# Patient Record
Sex: MALE | Race: WHITE | Employment: FULL TIME
[De-identification: names, ages, dates, MRNs, and addresses within clinical notes are randomized per-mention and may not be internally consistent; named-entity substitution may affect disease eponyms.]

---

## 2017-10-27 ENCOUNTER — HOSPITAL ENCOUNTER (OUTPATIENT)
Dept: MRI IMAGING | Facility: CLINIC | Age: 52
Discharge: HOME | End: 2017-10-27
Payer: OTHER GOVERNMENT

## 2017-10-27 DIAGNOSIS — S46.912A ELBOW STRAIN, LEFT, INITIAL ENCOUNTER: ICD-10-CM

## 2017-10-27 PROCEDURE — 73221 MRI JOINT UPR EXTREM W/O DYE: CPT

## 2017-11-08 ENCOUNTER — HOSPITAL ENCOUNTER (OUTPATIENT)
Dept: NEUROLOGY | Age: 52
Discharge: HOME | End: 2017-11-08
Payer: OTHER GOVERNMENT

## 2017-11-08 PROCEDURE — 95908 NRV CNDJ TST 3-4 STUDIES: CPT | Performed by: PHYSICAL MEDICINE & REHABILITATION

## 2017-11-08 PROCEDURE — 95886 MUSC TEST DONE W/N TEST COMP: CPT | Performed by: PHYSICAL MEDICINE & REHABILITATION

## 2018-02-03 ENCOUNTER — HOSPITAL ENCOUNTER (OUTPATIENT)
Age: 53
Discharge: HOME | End: 2018-02-03
Payer: COMMERCIAL

## 2018-02-03 DIAGNOSIS — N20.0 NEPHROLITHIASIS: ICD-10-CM

## 2018-02-03 DIAGNOSIS — Z12.5 SCREENING PSA (PROSTATE SPECIFIC ANTIGEN): ICD-10-CM

## 2018-02-03 DIAGNOSIS — I48.19 PERSISTENT ATRIAL FIBRILLATION (HCC): ICD-10-CM

## 2018-02-03 DIAGNOSIS — Z00.00 ANNUAL PHYSICAL EXAM: ICD-10-CM

## 2018-02-03 DIAGNOSIS — E78.00 PURE HYPERCHOLESTEROLEMIA: ICD-10-CM

## 2018-02-03 LAB
ABSOLUTE EOS #: 0.3 K/UL (ref 0–0.4)
ABSOLUTE IMMATURE GRANULOCYTE: ABNORMAL K/UL (ref 0–0.3)
ABSOLUTE LYMPH #: 1.9 K/UL (ref 1–4.8)
ABSOLUTE MONO #: 0.6 K/UL (ref 0.1–1.3)
ALBUMIN SERPL-MCNC: 3.9 G/DL (ref 3.5–5.2)
ALBUMIN/GLOBULIN RATIO: ABNORMAL (ref 1–2.5)
ALP BLD-CCNC: 96 U/L (ref 40–129)
ALT SERPL-CCNC: 110 U/L (ref 5–41)
ANION GAP SERPL CALCULATED.3IONS-SCNC: 14 MMOL/L (ref 9–17)
AST SERPL-CCNC: 29 U/L
BASOPHILS # BLD: 1 % (ref 0–2)
BASOPHILS ABSOLUTE: 0 K/UL (ref 0–0.2)
BILIRUB SERPL-MCNC: 0.74 MG/DL (ref 0.3–1.2)
BILIRUBIN DIRECT: 0.19 MG/DL
BILIRUBIN, INDIRECT: 0.55 MG/DL (ref 0–1)
BUN BLDV-MCNC: 19 MG/DL (ref 6–20)
BUN/CREAT BLD: ABNORMAL (ref 9–20)
CALCIUM SERPL-MCNC: 9.5 MG/DL (ref 8.6–10.4)
CHLORIDE BLD-SCNC: 102 MMOL/L (ref 98–107)
CHOLESTEROL/HDL RATIO: 4.5
CHOLESTEROL: 231 MG/DL
CO2: 23 MMOL/L (ref 20–31)
CREAT SERPL-MCNC: 1.09 MG/DL (ref 0.7–1.2)
DIFFERENTIAL TYPE: ABNORMAL
EOSINOPHILS RELATIVE PERCENT: 3 % (ref 0–4)
GFR AFRICAN AMERICAN: >60 ML/MIN
GFR NON-AFRICAN AMERICAN: >60 ML/MIN
GFR SERPL CREATININE-BSD FRML MDRD: ABNORMAL ML/MIN/{1.73_M2}
GFR SERPL CREATININE-BSD FRML MDRD: ABNORMAL ML/MIN/{1.73_M2}
GLOBULIN: ABNORMAL G/DL (ref 1.5–3.8)
GLUCOSE BLD-MCNC: 107 MG/DL (ref 70–99)
HCT VFR BLD CALC: 50.6 % (ref 41–53)
HDLC SERPL-MCNC: 51 MG/DL
HEMOGLOBIN: 17.1 G/DL (ref 13.5–17.5)
HEPATITIS C ANTIBODY: NONREACTIVE
IMMATURE GRANULOCYTES: ABNORMAL %
LDL CHOLESTEROL: 161 MG/DL (ref 0–130)
LYMPHOCYTES # BLD: 21 % (ref 24–44)
MCH RBC QN AUTO: 29.7 PG (ref 26–34)
MCHC RBC AUTO-ENTMCNC: 33.8 G/DL (ref 31–37)
MCV RBC AUTO: 87.9 FL (ref 80–100)
MONOCYTES # BLD: 7 % (ref 1–7)
NRBC AUTOMATED: ABNORMAL PER 100 WBC
PDW BLD-RTO: 14.3 % (ref 11.5–14.9)
PLATELET # BLD: 240 K/UL (ref 150–450)
PLATELET ESTIMATE: ABNORMAL
PMV BLD AUTO: 7.5 FL (ref 6–12)
POTASSIUM SERPL-SCNC: 4.3 MMOL/L (ref 3.7–5.3)
PROSTATE SPECIFIC ANTIGEN: 1.06 UG/L
RBC # BLD: 5.75 M/UL (ref 4.5–5.9)
RBC # BLD: ABNORMAL 10*6/UL
SEG NEUTROPHILS: 68 % (ref 36–66)
SEGMENTED NEUTROPHILS ABSOLUTE COUNT: 6.1 K/UL (ref 1.3–9.1)
SODIUM BLD-SCNC: 139 MMOL/L (ref 135–144)
THYROXINE, FREE: 0.92 NG/DL (ref 0.93–1.7)
TOTAL PROTEIN: 7.6 G/DL (ref 6.4–8.3)
TRIGL SERPL-MCNC: 95 MG/DL
TSH SERPL DL<=0.05 MIU/L-ACNC: 1.98 MIU/L (ref 0.3–5)
URIC ACID: 7.4 MG/DL (ref 3.4–7)
VLDLC SERPL CALC-MCNC: ABNORMAL MG/DL (ref 1–30)
WBC # BLD: 8.9 K/UL (ref 3.5–11)
WBC # BLD: ABNORMAL 10*3/UL

## 2018-02-03 PROCEDURE — 80076 HEPATIC FUNCTION PANEL: CPT

## 2018-02-03 PROCEDURE — 86803 HEPATITIS C AB TEST: CPT

## 2018-02-03 PROCEDURE — 85025 COMPLETE CBC W/AUTO DIFF WBC: CPT

## 2018-02-03 PROCEDURE — 80061 LIPID PANEL: CPT

## 2018-02-03 PROCEDURE — 84550 ASSAY OF BLOOD/URIC ACID: CPT

## 2018-02-03 PROCEDURE — 80048 BASIC METABOLIC PNL TOTAL CA: CPT

## 2018-02-03 PROCEDURE — G0103 PSA SCREENING: HCPCS

## 2018-02-03 PROCEDURE — 36415 COLL VENOUS BLD VENIPUNCTURE: CPT

## 2018-02-03 PROCEDURE — 84443 ASSAY THYROID STIM HORMONE: CPT

## 2018-02-03 PROCEDURE — 84439 ASSAY OF FREE THYROXINE: CPT

## 2018-03-12 ENCOUNTER — HOSPITAL ENCOUNTER (OUTPATIENT)
Dept: OCCUPATIONAL THERAPY | Age: 53
Setting detail: THERAPIES SERIES
Discharge: HOME | End: 2018-03-12
Payer: OTHER GOVERNMENT

## 2018-03-12 ENCOUNTER — HOSPITAL ENCOUNTER (OUTPATIENT)
Age: 53
Discharge: HOME | End: 2018-03-12
Payer: OTHER GOVERNMENT

## 2018-03-12 LAB
BUN BLDV-MCNC: 18 MG/DL (ref 6–20)
CREAT SERPL-MCNC: 0.97 MG/DL (ref 0.7–1.2)
GFR AFRICAN AMERICAN: >60 ML/MIN
GFR NON-AFRICAN AMERICAN: >60 ML/MIN
GFR SERPL CREATININE-BSD FRML MDRD: NORMAL ML/MIN/{1.73_M2}
GFR SERPL CREATININE-BSD FRML MDRD: NORMAL ML/MIN/{1.73_M2}

## 2018-03-12 PROCEDURE — 97110 THERAPEUTIC EXERCISES: CPT

## 2018-03-12 PROCEDURE — 36415 COLL VENOUS BLD VENIPUNCTURE: CPT

## 2018-03-12 PROCEDURE — 84520 ASSAY OF UREA NITROGEN: CPT

## 2018-03-12 PROCEDURE — 97166 OT EVAL MOD COMPLEX 45 MIN: CPT

## 2018-03-12 PROCEDURE — 82565 ASSAY OF CREATININE: CPT

## 2018-03-12 ASSESSMENT — PAIN SCALES - GENERAL: PAINLEVEL_OUTOF10: 6

## 2018-03-12 ASSESSMENT — PAIN DESCRIPTION - ORIENTATION: ORIENTATION: LEFT

## 2018-03-12 ASSESSMENT — PAIN DESCRIPTION - FREQUENCY: FREQUENCY: CONTINUOUS

## 2018-03-12 ASSESSMENT — PAIN DESCRIPTION - LOCATION: LOCATION: ELBOW;WRIST;HAND

## 2018-03-12 ASSESSMENT — 9 HOLE PEG TEST
TEST_RESULT: IMPAIRED
TESTTIME_SECONDS: 52
TESTTIME_SECONDS: 26

## 2018-03-12 ASSESSMENT — PAIN DESCRIPTION - PAIN TYPE: TYPE: ACUTE PAIN

## 2018-03-22 ENCOUNTER — HOSPITAL ENCOUNTER (OUTPATIENT)
Dept: OCCUPATIONAL THERAPY | Age: 53
Setting detail: THERAPIES SERIES
Discharge: HOME | End: 2018-03-22
Payer: OTHER GOVERNMENT

## 2018-03-22 PROCEDURE — 97110 THERAPEUTIC EXERCISES: CPT

## 2018-03-22 ASSESSMENT — PAIN DESCRIPTION - LOCATION: LOCATION: ELBOW;SHOULDER;ARM

## 2018-03-22 ASSESSMENT — PAIN DESCRIPTION - FREQUENCY: FREQUENCY: CONTINUOUS

## 2018-03-22 ASSESSMENT — PAIN SCALES - GENERAL: PAINLEVEL_OUTOF10: 4

## 2018-03-22 ASSESSMENT — PAIN DESCRIPTION - PAIN TYPE: TYPE: ACUTE PAIN

## 2018-03-22 ASSESSMENT — PAIN DESCRIPTION - PROGRESSION: CLINICAL_PROGRESSION: GRADUALLY IMPROVING

## 2018-03-22 ASSESSMENT — PAIN DESCRIPTION - ORIENTATION: ORIENTATION: LEFT

## 2018-03-22 NOTE — PROGRESS NOTES
Occupational [address] [place] [address]   Rehabilitation Services  Occupational Therapy Treatment Note  Date: 3/22/18  Patient Name: Hardy Ormond    MRN: [id number]  Account: [de-identified]   : 1965  (46 y.o.) Gender: male     General  Referring Practitioner: Dr Janice Delgado. Dana  Diagnosis: s/p lt elbow extensor tendon repair (2018)    (ICD-10 codes T96.777Q lt strain of elbow & forearm,  lt shoulder impingement syndrome, G95.945L lt shoulder strain)  OT Visit Information  OT Insurance Information:  [facility]  ( fax# [phone]) (phone# [phone])- pt's claim # [id number] ([employer] - fax# [phone]. Total # of Visits Approved: 18  Total # of Visits to Date: 2  Progress Note Counter: MD appt 2018  Subjective: Pt states at rest pain 3-4 lt elbow, catching and popping in lt shoulder and elbow. After PROM lt UE pain goes to 7-8. Pt had MRI rt arm. Pt reports difficulty using lt hand to close car door, to pull and hook seat belt. Pt reports difficulty pulling open doors /door knobs.   Pain Assessment  Patient Currently in Pain: Yes  Pain Assessment: 0-10  Pain Level: 4 (pain 3-4 pre tx, and pain 7-8 post exercise)  Pain Type: Acute pain  Pain Location: Elbow, Shoulder, Arm  Pain Orientation: Left  Pain Descriptors: Aching, Sore, Tingling, Tightness  Pain Frequency: Continuous  Clinical Progression: Gradually improving  Patient's Stated Pain Goal: No pain           Other exercises  Other exercises 1: lt hand based skateboard 20x reps each way (forward flexion/extension, horizontal abd/adduction, circles cw/ccw)  Other exercises 2: PROM lt shoulder, elbow, wrist, hand  Other exercises 3: Lt shoulder / scapula mobilization  Other exercises 4: Large red /white rom hoop (ht 31\") over and back w lt UE  Other exercises 5: Bilateral UE hold small yellow therapy ball  20 reps ( shoulder flexion/extension, shoulder horizontal

## 2018-03-26 ENCOUNTER — HOSPITAL ENCOUNTER (OUTPATIENT)
Dept: OCCUPATIONAL THERAPY | Age: 53
Setting detail: THERAPIES SERIES
Discharge: HOME | End: 2018-03-26
Payer: OTHER GOVERNMENT

## 2018-03-26 PROCEDURE — 97110 THERAPEUTIC EXERCISES: CPT

## 2018-03-26 ASSESSMENT — PAIN DESCRIPTION - DESCRIPTORS: DESCRIPTORS: ACHING;SORE;TINGLING

## 2018-03-26 ASSESSMENT — PAIN DESCRIPTION - PROGRESSION: CLINICAL_PROGRESSION: NOT CHANGED

## 2018-03-26 ASSESSMENT — PAIN DESCRIPTION - ORIENTATION: ORIENTATION: LEFT

## 2018-03-26 ASSESSMENT — PAIN DESCRIPTION - PAIN TYPE: TYPE: ACUTE PAIN

## 2018-03-26 ASSESSMENT — PAIN SCALES - GENERAL: PAINLEVEL_OUTOF10: 7

## 2018-03-26 ASSESSMENT — PAIN DESCRIPTION - LOCATION: LOCATION: ELBOW;SHOULDER;WRIST;HAND

## 2018-03-26 ASSESSMENT — PAIN DESCRIPTION - FREQUENCY: FREQUENCY: INTERMITTENT

## 2018-03-26 NOTE — PROGRESS NOTES
and IR/ER (Pain with ABD/ADD and ER)  Assessment  Performance deficits / Impairments: Decreased ROM, Decreased strength, Decreased fine motor control, Decreased sensation (Pain LUE)  Assessment: OT treatment focused on decreasing pain and increasing strength/function of LUE - see OT exercise sheet for detailed report. Added isometric exercises for shoulder to increase stability. Pt reported pain with ABD/ADD and ER but was able to complete exercises at slow pace and with occasional rest breaks. Pt reports that he is still having a hard time with simple tasks like buckling his seatbelt and opening a bottle of soda.   Treatment Diagnosis: Lt UE /hand weakness, pain, impaired coordination  Prognosis: Good  REQUIRES OT FOLLOW UP: Yes  Discharge Recommendations: Outpatient OT     Plan  REQUIRES OT FOLLOW UP: Yes     OT Individual Minutes  Time In: 6589  Time Out: 1616  Minutes: 44  Time Code Minutes   Timed Code Treatment Minutes: 44 Minutes    Electronically signed by Marquis Boone on 3/26/18 at 4:19 PM    Treatment Charges:  Minutes Units   Ultrasound     Electrical Stim     Iontophoresis     Paraffin      Massage     Eval     ADL      Ther Exercise 44 3   Ther Activities     Neuro Re-Ed     Splinting      Other     Total Treatment Time:  59 3

## 2018-03-28 ENCOUNTER — HOSPITAL ENCOUNTER (OUTPATIENT)
Dept: OCCUPATIONAL THERAPY | Age: 53
Setting detail: THERAPIES SERIES
Discharge: HOME | End: 2018-03-28
Payer: OTHER GOVERNMENT

## 2018-03-28 PROCEDURE — 97110 THERAPEUTIC EXERCISES: CPT

## 2018-03-28 ASSESSMENT — PAIN SCALES - GENERAL: PAINLEVEL_OUTOF10: 5

## 2018-03-28 ASSESSMENT — PAIN DESCRIPTION - ORIENTATION: ORIENTATION: LEFT

## 2018-03-28 ASSESSMENT — PAIN DESCRIPTION - DESCRIPTORS: DESCRIPTORS: ACHING;SORE;TINGLING

## 2018-03-28 ASSESSMENT — PAIN DESCRIPTION - PROGRESSION: CLINICAL_PROGRESSION: GRADUALLY IMPROVING

## 2018-03-28 ASSESSMENT — PAIN DESCRIPTION - PAIN TYPE: TYPE: ACUTE PAIN

## 2018-03-28 ASSESSMENT — PAIN DESCRIPTION - FREQUENCY: FREQUENCY: INTERMITTENT

## 2018-03-28 ASSESSMENT — PAIN DESCRIPTION - LOCATION: LOCATION: ELBOW;SHOULDER;WRIST

## 2018-04-02 ENCOUNTER — HOSPITAL ENCOUNTER (OUTPATIENT)
Dept: OCCUPATIONAL THERAPY | Age: 53
Setting detail: THERAPIES SERIES
Discharge: HOME | End: 2018-04-02
Payer: OTHER GOVERNMENT

## 2018-04-02 PROCEDURE — 97110 THERAPEUTIC EXERCISES: CPT

## 2018-04-02 ASSESSMENT — PAIN DESCRIPTION - FREQUENCY: FREQUENCY: INTERMITTENT

## 2018-04-02 ASSESSMENT — PAIN SCALES - GENERAL: PAINLEVEL_OUTOF10: 8

## 2018-04-02 ASSESSMENT — PAIN DESCRIPTION - ORIENTATION: ORIENTATION: LEFT

## 2018-04-02 ASSESSMENT — PAIN DESCRIPTION - LOCATION: LOCATION: ARM;NECK

## 2018-04-02 ASSESSMENT — PAIN DESCRIPTION - PROGRESSION: CLINICAL_PROGRESSION: GRADUALLY WORSENING

## 2018-04-02 ASSESSMENT — PAIN DESCRIPTION - DESCRIPTORS: DESCRIPTORS: ACHING

## 2018-04-02 ASSESSMENT — PAIN DESCRIPTION - PAIN TYPE: TYPE: ACUTE PAIN

## 2018-04-04 ENCOUNTER — HOSPITAL ENCOUNTER (OUTPATIENT)
Dept: OCCUPATIONAL THERAPY | Age: 53
Setting detail: THERAPIES SERIES
Discharge: HOME | End: 2018-04-04
Payer: OTHER GOVERNMENT

## 2018-04-04 PROCEDURE — 97110 THERAPEUTIC EXERCISES: CPT

## 2018-04-04 ASSESSMENT — PAIN DESCRIPTION - PAIN TYPE: TYPE: ACUTE PAIN

## 2018-04-04 ASSESSMENT — PAIN SCALES - GENERAL: PAINLEVEL_OUTOF10: 6

## 2018-04-04 ASSESSMENT — PAIN DESCRIPTION - PROGRESSION: CLINICAL_PROGRESSION: GRADUALLY IMPROVING

## 2018-04-04 ASSESSMENT — PAIN DESCRIPTION - LOCATION: LOCATION: ARM;SHOULDER;ELBOW

## 2018-04-04 ASSESSMENT — PAIN DESCRIPTION - ORIENTATION: ORIENTATION: LEFT

## 2018-04-04 ASSESSMENT — PAIN DESCRIPTION - DESCRIPTORS: DESCRIPTORS: ACHING;SORE

## 2018-04-06 ENCOUNTER — HOSPITAL ENCOUNTER (OUTPATIENT)
Dept: OCCUPATIONAL THERAPY | Age: 53
Setting detail: THERAPIES SERIES
Discharge: HOME | End: 2018-04-06
Payer: OTHER GOVERNMENT

## 2018-04-06 PROCEDURE — 97110 THERAPEUTIC EXERCISES: CPT

## 2018-04-06 ASSESSMENT — PAIN DESCRIPTION - PROGRESSION: CLINICAL_PROGRESSION: NOT CHANGED

## 2018-04-06 ASSESSMENT — 9 HOLE PEG TEST
TESTTIME_SECONDS: 38
TEST_RESULT: IMPAIRED

## 2018-04-06 ASSESSMENT — PAIN DESCRIPTION - LOCATION: LOCATION: SHOULDER;ELBOW

## 2018-04-06 ASSESSMENT — PAIN DESCRIPTION - ORIENTATION: ORIENTATION: LEFT

## 2018-04-06 ASSESSMENT — PAIN DESCRIPTION - FREQUENCY: FREQUENCY: INTERMITTENT

## 2018-04-06 ASSESSMENT — PAIN SCALES - GENERAL: PAINLEVEL_OUTOF10: 7

## 2018-04-06 ASSESSMENT — PAIN DESCRIPTION - PAIN TYPE: TYPE: ACUTE PAIN

## 2018-04-06 ASSESSMENT — PAIN DESCRIPTION - DESCRIPTORS: DESCRIPTORS: ACHING;SORE

## 2018-04-09 ENCOUNTER — HOSPITAL ENCOUNTER (OUTPATIENT)
Dept: OCCUPATIONAL THERAPY | Age: 53
Setting detail: THERAPIES SERIES
Discharge: HOME | End: 2018-04-09
Payer: OTHER GOVERNMENT

## 2018-04-09 PROBLEM — G47.33 OBSTRUCTIVE SLEEP APNEA SYNDROME: Status: ACTIVE | Noted: 2018-04-09

## 2018-04-09 PROCEDURE — 97110 THERAPEUTIC EXERCISES: CPT

## 2018-04-09 ASSESSMENT — PAIN DESCRIPTION - LOCATION: LOCATION: ARM;SHOULDER;ELBOW

## 2018-04-09 ASSESSMENT — PAIN SCALES - GENERAL: PAINLEVEL_OUTOF10: 7

## 2018-04-09 ASSESSMENT — PAIN DESCRIPTION - PAIN TYPE: TYPE: ACUTE PAIN

## 2018-04-09 ASSESSMENT — PAIN DESCRIPTION - ORIENTATION: ORIENTATION: LEFT

## 2018-04-09 ASSESSMENT — PAIN DESCRIPTION - DESCRIPTORS: DESCRIPTORS: SORE;ACHING

## 2018-04-09 ASSESSMENT — PAIN DESCRIPTION - FREQUENCY: FREQUENCY: INTERMITTENT

## 2018-04-09 ASSESSMENT — PAIN DESCRIPTION - PROGRESSION: CLINICAL_PROGRESSION: NOT CHANGED

## 2018-04-11 ENCOUNTER — HOSPITAL ENCOUNTER (OUTPATIENT)
Dept: OCCUPATIONAL THERAPY | Age: 53
Setting detail: THERAPIES SERIES
Discharge: HOME | End: 2018-04-11
Payer: OTHER GOVERNMENT

## 2018-04-11 PROCEDURE — 97110 THERAPEUTIC EXERCISES: CPT

## 2018-04-11 ASSESSMENT — PAIN DESCRIPTION - DESCRIPTORS: DESCRIPTORS: ACHING;SORE;NUMBNESS

## 2018-04-11 ASSESSMENT — 9 HOLE PEG TEST
TESTTIME_SECONDS: 32
TEST_RESULT: IMPAIRED

## 2018-04-11 ASSESSMENT — PAIN DESCRIPTION - ORIENTATION: ORIENTATION: LEFT

## 2018-04-11 ASSESSMENT — PAIN SCALES - GENERAL: PAINLEVEL_OUTOF10: 6

## 2018-04-11 ASSESSMENT — PAIN DESCRIPTION - PROGRESSION: CLINICAL_PROGRESSION: NOT CHANGED

## 2018-04-11 ASSESSMENT — PAIN DESCRIPTION - PAIN TYPE: TYPE: ACUTE PAIN

## 2018-04-13 ENCOUNTER — HOSPITAL ENCOUNTER (OUTPATIENT)
Dept: OCCUPATIONAL THERAPY | Age: 53
Setting detail: THERAPIES SERIES
Discharge: HOME | End: 2018-04-13
Payer: OTHER GOVERNMENT

## 2018-04-13 PROCEDURE — 97110 THERAPEUTIC EXERCISES: CPT

## 2018-04-13 ASSESSMENT — PAIN DESCRIPTION - FREQUENCY: FREQUENCY: INTERMITTENT

## 2018-04-13 ASSESSMENT — PAIN SCALES - GENERAL: PAINLEVEL_OUTOF10: 7

## 2018-04-13 ASSESSMENT — PAIN DESCRIPTION - PROGRESSION: CLINICAL_PROGRESSION: NOT CHANGED

## 2018-04-13 ASSESSMENT — PAIN DESCRIPTION - PAIN TYPE: TYPE: ACUTE PAIN

## 2018-04-13 ASSESSMENT — PAIN DESCRIPTION - DESCRIPTORS: DESCRIPTORS: ACHING;TIGHTNESS;THROBBING

## 2018-04-13 ASSESSMENT — PAIN DESCRIPTION - ORIENTATION: ORIENTATION: LEFT

## 2018-04-13 ASSESSMENT — PAIN DESCRIPTION - LOCATION: LOCATION: SHOULDER;ELBOW;HAND

## 2018-04-18 ENCOUNTER — HOSPITAL ENCOUNTER (OUTPATIENT)
Dept: OCCUPATIONAL THERAPY | Age: 53
Setting detail: THERAPIES SERIES
Discharge: HOME | End: 2018-04-18
Payer: OTHER GOVERNMENT

## 2018-04-18 PROCEDURE — 97110 THERAPEUTIC EXERCISES: CPT

## 2018-04-18 ASSESSMENT — PAIN DESCRIPTION - PAIN TYPE: TYPE: ACUTE PAIN

## 2018-04-18 ASSESSMENT — PAIN DESCRIPTION - ORIENTATION: ORIENTATION: LEFT

## 2018-04-18 ASSESSMENT — PAIN DESCRIPTION - DESCRIPTORS: DESCRIPTORS: ACHING;THROBBING;TIGHTNESS

## 2018-04-18 ASSESSMENT — PAIN DESCRIPTION - FREQUENCY: FREQUENCY: INTERMITTENT

## 2018-04-18 ASSESSMENT — PAIN SCALES - GENERAL: PAINLEVEL_OUTOF10: 6

## 2018-04-18 ASSESSMENT — PAIN DESCRIPTION - LOCATION: LOCATION: ARM;ELBOW;SHOULDER

## 2018-04-20 ENCOUNTER — HOSPITAL ENCOUNTER (OUTPATIENT)
Dept: OCCUPATIONAL THERAPY | Age: 53
Setting detail: THERAPIES SERIES
Discharge: HOME | End: 2018-04-20
Payer: OTHER GOVERNMENT

## 2018-04-20 PROCEDURE — 97110 THERAPEUTIC EXERCISES: CPT

## 2018-04-20 ASSESSMENT — PAIN DESCRIPTION - ORIENTATION: ORIENTATION: LEFT

## 2018-04-20 ASSESSMENT — PAIN DESCRIPTION - FREQUENCY: FREQUENCY: INTERMITTENT

## 2018-04-20 ASSESSMENT — PAIN DESCRIPTION - LOCATION: LOCATION: ELBOW;SHOULDER;ARM

## 2018-04-20 ASSESSMENT — PAIN DESCRIPTION - PROGRESSION: CLINICAL_PROGRESSION: NOT CHANGED

## 2018-04-20 ASSESSMENT — PAIN DESCRIPTION - PAIN TYPE: TYPE: ACUTE PAIN

## 2018-04-23 ENCOUNTER — HOSPITAL ENCOUNTER (OUTPATIENT)
Dept: OCCUPATIONAL THERAPY | Age: 53
Setting detail: THERAPIES SERIES
Discharge: HOME | End: 2018-04-23
Payer: OTHER GOVERNMENT

## 2018-04-23 PROCEDURE — 97110 THERAPEUTIC EXERCISES: CPT

## 2018-04-23 ASSESSMENT — PAIN DESCRIPTION - FREQUENCY: FREQUENCY: INTERMITTENT

## 2018-04-23 ASSESSMENT — PAIN DESCRIPTION - PAIN TYPE: TYPE: ACUTE PAIN

## 2018-04-23 ASSESSMENT — PAIN DESCRIPTION - LOCATION: LOCATION: ELBOW;SHOULDER

## 2018-04-23 ASSESSMENT — PAIN DESCRIPTION - ORIENTATION: ORIENTATION: LEFT

## 2018-04-23 ASSESSMENT — PAIN SCALES - GENERAL: PAINLEVEL_OUTOF10: 7

## 2018-04-23 ASSESSMENT — PAIN DESCRIPTION - PROGRESSION: CLINICAL_PROGRESSION: NOT CHANGED

## 2018-04-25 ENCOUNTER — HOSPITAL ENCOUNTER (OUTPATIENT)
Dept: OCCUPATIONAL THERAPY | Age: 53
Setting detail: THERAPIES SERIES
Discharge: HOME | End: 2018-04-25
Payer: OTHER GOVERNMENT

## 2018-04-25 PROCEDURE — 97110 THERAPEUTIC EXERCISES: CPT

## 2018-04-25 ASSESSMENT — PAIN DESCRIPTION - DESCRIPTORS: DESCRIPTORS: ACHING;SHARP

## 2018-04-25 ASSESSMENT — 9 HOLE PEG TEST
TEST_RESULT: IMPAIRED
TESTTIME_SECONDS: 34

## 2018-04-25 ASSESSMENT — PAIN DESCRIPTION - ORIENTATION: ORIENTATION: LEFT

## 2018-04-25 ASSESSMENT — PAIN DESCRIPTION - FREQUENCY: FREQUENCY: INTERMITTENT

## 2018-04-25 ASSESSMENT — PAIN DESCRIPTION - LOCATION: LOCATION: ARM;SHOULDER;ELBOW

## 2018-04-25 ASSESSMENT — PAIN DESCRIPTION - PROGRESSION: CLINICAL_PROGRESSION: NOT CHANGED

## 2018-04-25 ASSESSMENT — PAIN SCALES - GENERAL: PAINLEVEL_OUTOF10: 7

## 2018-04-26 ENCOUNTER — HOSPITAL ENCOUNTER (OUTPATIENT)
Dept: OCCUPATIONAL THERAPY | Age: 53
Setting detail: THERAPIES SERIES
Discharge: HOME | End: 2018-04-26
Payer: OTHER GOVERNMENT

## 2018-05-07 ENCOUNTER — HOSPITAL ENCOUNTER (OUTPATIENT)
Dept: OCCUPATIONAL THERAPY | Age: 53
Setting detail: THERAPIES SERIES
Discharge: HOME | End: 2018-05-07
Payer: OTHER GOVERNMENT

## 2018-05-07 PROCEDURE — 97110 THERAPEUTIC EXERCISES: CPT

## 2018-05-07 ASSESSMENT — PAIN DESCRIPTION - DESCRIPTORS: DESCRIPTORS: ACHING;SORE

## 2018-05-07 ASSESSMENT — PAIN SCALES - GENERAL: PAINLEVEL_OUTOF10: 7

## 2018-05-07 ASSESSMENT — PAIN DESCRIPTION - ORIENTATION: ORIENTATION: LEFT

## 2018-05-07 ASSESSMENT — PAIN DESCRIPTION - PAIN TYPE: TYPE: ACUTE PAIN

## 2018-05-07 ASSESSMENT — PAIN DESCRIPTION - LOCATION: LOCATION: ARM;SHOULDER;ELBOW

## 2018-05-09 ENCOUNTER — HOSPITAL ENCOUNTER (OUTPATIENT)
Dept: OCCUPATIONAL THERAPY | Age: 53
Setting detail: THERAPIES SERIES
Discharge: HOME | End: 2018-05-09
Payer: OTHER GOVERNMENT

## 2018-05-09 PROCEDURE — 97110 THERAPEUTIC EXERCISES: CPT

## 2018-05-09 ASSESSMENT — PAIN DESCRIPTION - DESCRIPTORS: DESCRIPTORS: ACHING;SHARP;SORE

## 2018-05-09 ASSESSMENT — PAIN SCALES - GENERAL: PAINLEVEL_OUTOF10: 8

## 2018-05-09 ASSESSMENT — PAIN DESCRIPTION - FREQUENCY: FREQUENCY: INTERMITTENT

## 2018-05-09 ASSESSMENT — PAIN DESCRIPTION - PROGRESSION: CLINICAL_PROGRESSION: GRADUALLY WORSENING

## 2018-05-09 ASSESSMENT — PAIN DESCRIPTION - ORIENTATION: ORIENTATION: LEFT

## 2018-05-09 ASSESSMENT — PAIN DESCRIPTION - LOCATION: LOCATION: SHOULDER;ELBOW

## 2018-05-11 ENCOUNTER — HOSPITAL ENCOUNTER (OUTPATIENT)
Dept: OCCUPATIONAL THERAPY | Age: 53
Setting detail: THERAPIES SERIES
Discharge: HOME | End: 2018-05-11
Payer: OTHER GOVERNMENT

## 2018-05-11 PROCEDURE — 97110 THERAPEUTIC EXERCISES: CPT

## 2018-05-11 ASSESSMENT — PAIN DESCRIPTION - LOCATION: LOCATION: SHOULDER;WRIST;ELBOW

## 2018-05-11 ASSESSMENT — PAIN SCALES - GENERAL: PAINLEVEL_OUTOF10: 7

## 2018-05-11 ASSESSMENT — PAIN DESCRIPTION - FREQUENCY: FREQUENCY: INTERMITTENT

## 2018-05-11 ASSESSMENT — PAIN DESCRIPTION - DESCRIPTORS: DESCRIPTORS: ACHING;SORE

## 2018-05-11 ASSESSMENT — PAIN DESCRIPTION - ORIENTATION: ORIENTATION: LEFT

## 2018-05-11 ASSESSMENT — PAIN DESCRIPTION - PAIN TYPE: TYPE: ACUTE PAIN

## 2018-05-11 ASSESSMENT — PAIN DESCRIPTION - PROGRESSION: CLINICAL_PROGRESSION: NOT CHANGED

## 2018-05-16 ENCOUNTER — HOSPITAL ENCOUNTER (OUTPATIENT)
Age: 53
Discharge: HOME | End: 2018-05-16
Payer: COMMERCIAL

## 2018-05-16 ENCOUNTER — HOSPITAL ENCOUNTER (OUTPATIENT)
Dept: OCCUPATIONAL THERAPY | Age: 53
Setting detail: THERAPIES SERIES
Discharge: HOME | End: 2018-05-16
Payer: OTHER GOVERNMENT

## 2018-05-16 LAB
ALBUMIN SERPL-MCNC: 4.2 G/DL (ref 3.5–5.2)
ALBUMIN/GLOBULIN RATIO: NORMAL (ref 1–2.5)
ALP BLD-CCNC: 91 U/L (ref 40–129)
ALT SERPL-CCNC: 31 U/L (ref 5–41)
AST SERPL-CCNC: 20 U/L
BILIRUB SERPL-MCNC: 0.78 MG/DL (ref 0.3–1.2)
BILIRUBIN DIRECT: 0.17 MG/DL
BILIRUBIN, INDIRECT: 0.61 MG/DL (ref 0–1)
CHOLESTEROL/HDL RATIO: 3.2
CHOLESTEROL: 162 MG/DL
GLOBULIN: NORMAL G/DL (ref 1.5–3.8)
HDLC SERPL-MCNC: 50 MG/DL
LDL CHOLESTEROL: 90 MG/DL (ref 0–130)
TOTAL PROTEIN: 7.3 G/DL (ref 6.4–8.3)
TRIGL SERPL-MCNC: 109 MG/DL
VLDLC SERPL CALC-MCNC: NORMAL MG/DL (ref 1–30)

## 2018-05-16 PROCEDURE — 80061 LIPID PANEL: CPT

## 2018-05-16 PROCEDURE — 97110 THERAPEUTIC EXERCISES: CPT

## 2018-05-16 PROCEDURE — 80076 HEPATIC FUNCTION PANEL: CPT

## 2018-05-16 PROCEDURE — 36415 COLL VENOUS BLD VENIPUNCTURE: CPT

## 2018-05-16 ASSESSMENT — PAIN DESCRIPTION - DESCRIPTORS: DESCRIPTORS: ACHING;SORE

## 2018-05-16 ASSESSMENT — PAIN SCALES - GENERAL: PAINLEVEL_OUTOF10: 7

## 2018-05-16 ASSESSMENT — PAIN DESCRIPTION - PAIN TYPE: TYPE: ACUTE PAIN

## 2018-05-16 ASSESSMENT — PAIN DESCRIPTION - LOCATION: LOCATION: ELBOW;SHOULDER

## 2018-05-16 ASSESSMENT — PAIN DESCRIPTION - FREQUENCY: FREQUENCY: INTERMITTENT

## 2018-05-16 ASSESSMENT — PAIN DESCRIPTION - ORIENTATION: ORIENTATION: LEFT

## 2018-05-16 ASSESSMENT — 9 HOLE PEG TEST: TESTTIME_SECONDS: 29

## 2018-05-16 ASSESSMENT — PAIN DESCRIPTION - PROGRESSION: CLINICAL_PROGRESSION: NOT CHANGED

## 2018-05-17 ENCOUNTER — HOSPITAL ENCOUNTER (OUTPATIENT)
Dept: OCCUPATIONAL THERAPY | Age: 53
Setting detail: THERAPIES SERIES
Discharge: HOME | End: 2018-05-17
Payer: OTHER GOVERNMENT

## 2018-05-17 PROCEDURE — 97110 THERAPEUTIC EXERCISES: CPT

## 2018-05-17 ASSESSMENT — PAIN DESCRIPTION - PROGRESSION: CLINICAL_PROGRESSION: NOT CHANGED

## 2018-05-17 ASSESSMENT — PAIN SCALES - GENERAL: PAINLEVEL_OUTOF10: 7

## 2018-05-17 ASSESSMENT — PAIN DESCRIPTION - ORIENTATION: ORIENTATION: LEFT

## 2018-05-17 ASSESSMENT — PAIN DESCRIPTION - DESCRIPTORS: DESCRIPTORS: ACHING;SORE

## 2018-05-17 ASSESSMENT — PAIN DESCRIPTION - FREQUENCY: FREQUENCY: INTERMITTENT

## 2018-05-17 ASSESSMENT — PAIN DESCRIPTION - PAIN TYPE: TYPE: ACUTE PAIN

## 2018-05-17 ASSESSMENT — PAIN DESCRIPTION - LOCATION: LOCATION: SHOULDER;ELBOW;WRIST

## 2018-05-25 ENCOUNTER — HOSPITAL ENCOUNTER (OUTPATIENT)
Dept: OCCUPATIONAL THERAPY | Age: 53
Setting detail: THERAPIES SERIES
Discharge: HOME | End: 2018-05-25
Payer: OTHER GOVERNMENT

## 2018-05-25 PROCEDURE — 97110 THERAPEUTIC EXERCISES: CPT

## 2018-05-25 ASSESSMENT — PAIN DESCRIPTION - LOCATION: LOCATION: ELBOW;SHOULDER

## 2018-05-25 ASSESSMENT — PAIN DESCRIPTION - ORIENTATION: ORIENTATION: LEFT

## 2018-05-25 ASSESSMENT — PAIN DESCRIPTION - DESCRIPTORS: DESCRIPTORS: ACHING;SORE

## 2018-05-25 ASSESSMENT — PAIN DESCRIPTION - FREQUENCY: FREQUENCY: INTERMITTENT

## 2018-05-25 ASSESSMENT — PAIN DESCRIPTION - PAIN TYPE: TYPE: ACUTE PAIN

## 2018-05-25 ASSESSMENT — PAIN SCALES - GENERAL: PAINLEVEL_OUTOF10: 6

## 2018-05-25 ASSESSMENT — PAIN DESCRIPTION - PROGRESSION: CLINICAL_PROGRESSION: NOT CHANGED

## 2018-05-30 ENCOUNTER — HOSPITAL ENCOUNTER (OUTPATIENT)
Dept: OCCUPATIONAL THERAPY | Age: 53
Setting detail: THERAPIES SERIES
Discharge: HOME | End: 2018-05-30
Payer: OTHER GOVERNMENT

## 2018-05-30 PROCEDURE — 97110 THERAPEUTIC EXERCISES: CPT

## 2018-05-30 ASSESSMENT — PAIN DESCRIPTION - FREQUENCY: FREQUENCY: CONTINUOUS

## 2018-05-30 ASSESSMENT — PAIN SCALES - GENERAL: PAINLEVEL_OUTOF10: 7

## 2018-05-30 ASSESSMENT — PAIN DESCRIPTION - LOCATION: LOCATION: ELBOW;SHOULDER;WRIST

## 2018-05-30 ASSESSMENT — PAIN DESCRIPTION - PAIN TYPE: TYPE: ACUTE PAIN

## 2018-05-30 ASSESSMENT — PAIN DESCRIPTION - ORIENTATION: ORIENTATION: LEFT

## 2018-05-30 ASSESSMENT — PAIN DESCRIPTION - DESCRIPTORS: DESCRIPTORS: ACHING;SORE;SHOOTING

## 2018-05-30 ASSESSMENT — PAIN DESCRIPTION - PROGRESSION: CLINICAL_PROGRESSION: GRADUALLY WORSENING

## 2018-06-01 ENCOUNTER — HOSPITAL ENCOUNTER (OUTPATIENT)
Dept: OCCUPATIONAL THERAPY | Age: 53
Setting detail: THERAPIES SERIES
Discharge: HOME | End: 2018-06-01
Payer: OTHER GOVERNMENT

## 2018-06-01 PROCEDURE — 97110 THERAPEUTIC EXERCISES: CPT

## 2018-06-01 ASSESSMENT — PAIN DESCRIPTION - DESCRIPTORS: DESCRIPTORS: ACHING;SORE;SHOOTING

## 2018-06-01 ASSESSMENT — PAIN DESCRIPTION - PAIN TYPE: TYPE: ACUTE PAIN

## 2018-06-01 ASSESSMENT — PAIN DESCRIPTION - LOCATION: LOCATION: ELBOW;SHOULDER;WRIST

## 2018-06-01 ASSESSMENT — PAIN DESCRIPTION - ORIENTATION: ORIENTATION: LEFT

## 2018-06-01 ASSESSMENT — PAIN DESCRIPTION - PROGRESSION: CLINICAL_PROGRESSION: NOT CHANGED

## 2018-06-01 ASSESSMENT — PAIN DESCRIPTION - FREQUENCY: FREQUENCY: CONTINUOUS

## 2018-06-01 ASSESSMENT — PAIN SCALES - GENERAL: PAINLEVEL_OUTOF10: 6

## 2018-06-06 ENCOUNTER — HOSPITAL ENCOUNTER (OUTPATIENT)
Dept: OCCUPATIONAL THERAPY | Age: 53
Setting detail: THERAPIES SERIES
Discharge: HOME | End: 2018-06-06
Payer: OTHER GOVERNMENT

## 2018-06-06 ENCOUNTER — HOSPITAL ENCOUNTER (OUTPATIENT)
Dept: PHYSICAL THERAPY | Age: 53
Setting detail: THERAPIES SERIES
Discharge: HOME | End: 2018-06-06
Payer: OTHER GOVERNMENT

## 2018-06-06 PROCEDURE — 97140 MANUAL THERAPY 1/> REGIONS: CPT

## 2018-06-06 PROCEDURE — 97162 PT EVAL MOD COMPLEX 30 MIN: CPT

## 2018-06-06 PROCEDURE — 97110 THERAPEUTIC EXERCISES: CPT

## 2018-06-06 ASSESSMENT — PAIN DESCRIPTION - LOCATION
LOCATION: ELBOW;SHOULDER
LOCATION: ELBOW;WRIST

## 2018-06-06 ASSESSMENT — PAIN DESCRIPTION - ORIENTATION
ORIENTATION: LEFT
ORIENTATION: LEFT

## 2018-06-06 ASSESSMENT — PAIN SCALES - GENERAL
PAINLEVEL_OUTOF10: 7
PAINLEVEL_OUTOF10: 6

## 2018-06-06 ASSESSMENT — PAIN DESCRIPTION - PROGRESSION
CLINICAL_PROGRESSION: GRADUALLY WORSENING
CLINICAL_PROGRESSION: NOT CHANGED

## 2018-06-06 ASSESSMENT — PAIN DESCRIPTION - DESCRIPTORS
DESCRIPTORS: ACHING;CONSTANT
DESCRIPTORS: ACHING;CONSTANT

## 2018-06-06 ASSESSMENT — PAIN DESCRIPTION - FREQUENCY
FREQUENCY: CONTINUOUS
FREQUENCY: CONTINUOUS

## 2018-06-06 ASSESSMENT — PAIN DESCRIPTION - PAIN TYPE
TYPE: ACUTE PAIN
TYPE: ACUTE PAIN

## 2018-06-08 ENCOUNTER — HOSPITAL ENCOUNTER (OUTPATIENT)
Dept: OCCUPATIONAL THERAPY | Age: 53
Setting detail: THERAPIES SERIES
Discharge: HOME | End: 2018-06-08
Payer: OTHER GOVERNMENT

## 2018-06-08 PROCEDURE — 97110 THERAPEUTIC EXERCISES: CPT

## 2018-06-08 ASSESSMENT — PAIN DESCRIPTION - LOCATION: LOCATION: ELBOW;WRIST

## 2018-06-08 ASSESSMENT — PAIN DESCRIPTION - DESCRIPTORS: DESCRIPTORS: ACHING;CONSTANT

## 2018-06-08 ASSESSMENT — PAIN SCALES - GENERAL: PAINLEVEL_OUTOF10: 6

## 2018-06-08 ASSESSMENT — PAIN DESCRIPTION - PAIN TYPE: TYPE: ACUTE PAIN

## 2018-06-08 ASSESSMENT — PAIN DESCRIPTION - ORIENTATION: ORIENTATION: LEFT

## 2018-06-08 ASSESSMENT — PAIN DESCRIPTION - PROGRESSION: CLINICAL_PROGRESSION: NOT CHANGED

## 2018-06-08 ASSESSMENT — PAIN DESCRIPTION - FREQUENCY: FREQUENCY: CONTINUOUS

## 2018-06-11 ENCOUNTER — HOSPITAL ENCOUNTER (OUTPATIENT)
Dept: OCCUPATIONAL THERAPY | Age: 53
Setting detail: THERAPIES SERIES
Discharge: HOME | End: 2018-06-11
Payer: OTHER GOVERNMENT

## 2018-06-11 PROCEDURE — 97110 THERAPEUTIC EXERCISES: CPT

## 2018-06-11 ASSESSMENT — PAIN DESCRIPTION - PROGRESSION: CLINICAL_PROGRESSION: GRADUALLY WORSENING

## 2018-06-11 ASSESSMENT — PAIN DESCRIPTION - PAIN TYPE: TYPE: ACUTE PAIN

## 2018-06-11 ASSESSMENT — PAIN DESCRIPTION - FREQUENCY: FREQUENCY: CONTINUOUS

## 2018-06-11 ASSESSMENT — PAIN DESCRIPTION - DESCRIPTORS: DESCRIPTORS: ACHING;CRAMPING

## 2018-06-11 ASSESSMENT — PAIN SCALES - GENERAL: PAINLEVEL_OUTOF10: 8

## 2018-06-11 ASSESSMENT — PAIN DESCRIPTION - ORIENTATION: ORIENTATION: LEFT

## 2018-06-11 ASSESSMENT — PAIN DESCRIPTION - LOCATION: LOCATION: ELBOW;WRIST

## 2018-06-13 ENCOUNTER — HOSPITAL ENCOUNTER (OUTPATIENT)
Dept: PHYSICAL THERAPY | Age: 53
Setting detail: THERAPIES SERIES
Discharge: HOME | End: 2018-06-13
Payer: OTHER GOVERNMENT

## 2018-06-13 ENCOUNTER — HOSPITAL ENCOUNTER (OUTPATIENT)
Dept: OCCUPATIONAL THERAPY | Age: 53
Setting detail: THERAPIES SERIES
Discharge: HOME | End: 2018-06-13
Payer: OTHER GOVERNMENT

## 2018-06-13 PROCEDURE — G0283 ELEC STIM OTHER THAN WOUND: HCPCS

## 2018-06-13 PROCEDURE — 97110 THERAPEUTIC EXERCISES: CPT

## 2018-06-13 PROCEDURE — 97140 MANUAL THERAPY 1/> REGIONS: CPT

## 2018-06-13 ASSESSMENT — PAIN DESCRIPTION - PAIN TYPE
TYPE: ACUTE PAIN
TYPE: ACUTE PAIN

## 2018-06-13 ASSESSMENT — PAIN SCALES - GENERAL
PAINLEVEL_OUTOF10: 7
PAINLEVEL_OUTOF10: 6

## 2018-06-13 ASSESSMENT — PAIN DESCRIPTION - FREQUENCY: FREQUENCY: CONTINUOUS

## 2018-06-13 ASSESSMENT — PAIN DESCRIPTION - DESCRIPTORS
DESCRIPTORS: SORE;SHARP
DESCRIPTORS: ACHING;SORE

## 2018-06-13 ASSESSMENT — PAIN DESCRIPTION - PROGRESSION
CLINICAL_PROGRESSION: GRADUALLY IMPROVING
CLINICAL_PROGRESSION: GRADUALLY WORSENING

## 2018-06-13 ASSESSMENT — PAIN DESCRIPTION - ORIENTATION
ORIENTATION: LEFT
ORIENTATION: LEFT

## 2018-06-13 ASSESSMENT — PAIN DESCRIPTION - LOCATION
LOCATION: SHOULDER
LOCATION: ELBOW;WRIST

## 2018-06-15 ENCOUNTER — HOSPITAL ENCOUNTER (OUTPATIENT)
Dept: PHYSICAL THERAPY | Age: 53
Setting detail: THERAPIES SERIES
Discharge: HOME | End: 2018-06-15
Payer: OTHER GOVERNMENT

## 2018-06-15 ENCOUNTER — HOSPITAL ENCOUNTER (OUTPATIENT)
Dept: OCCUPATIONAL THERAPY | Age: 53
Setting detail: THERAPIES SERIES
Discharge: HOME | End: 2018-06-15
Payer: OTHER GOVERNMENT

## 2018-06-15 PROCEDURE — 97110 THERAPEUTIC EXERCISES: CPT

## 2018-06-15 PROCEDURE — 97140 MANUAL THERAPY 1/> REGIONS: CPT

## 2018-06-15 ASSESSMENT — PAIN DESCRIPTION - DESCRIPTORS: DESCRIPTORS: ACHING;SORE

## 2018-06-15 ASSESSMENT — PAIN DESCRIPTION - PROGRESSION
CLINICAL_PROGRESSION: GRADUALLY WORSENING
CLINICAL_PROGRESSION: NOT CHANGED

## 2018-06-15 ASSESSMENT — PAIN DESCRIPTION - ORIENTATION
ORIENTATION: LEFT
ORIENTATION: LEFT

## 2018-06-15 ASSESSMENT — PAIN DESCRIPTION - LOCATION
LOCATION: SHOULDER
LOCATION: SHOULDER;ELBOW

## 2018-06-15 ASSESSMENT — PAIN DESCRIPTION - PAIN TYPE
TYPE: ACUTE PAIN
TYPE: ACUTE PAIN

## 2018-06-15 ASSESSMENT — PAIN SCALES - GENERAL
PAINLEVEL_OUTOF10: 6
PAINLEVEL_OUTOF10: 7

## 2018-06-15 ASSESSMENT — PAIN DESCRIPTION - FREQUENCY: FREQUENCY: CONTINUOUS

## 2018-06-18 ENCOUNTER — HOSPITAL ENCOUNTER (OUTPATIENT)
Dept: PHYSICAL THERAPY | Age: 53
Setting detail: THERAPIES SERIES
Discharge: HOME | End: 2018-06-18
Payer: OTHER GOVERNMENT

## 2018-06-18 ENCOUNTER — HOSPITAL ENCOUNTER (OUTPATIENT)
Dept: OCCUPATIONAL THERAPY | Age: 53
Setting detail: THERAPIES SERIES
Discharge: HOME | End: 2018-06-18
Payer: OTHER GOVERNMENT

## 2018-06-18 PROCEDURE — G0283 ELEC STIM OTHER THAN WOUND: HCPCS

## 2018-06-18 PROCEDURE — 97110 THERAPEUTIC EXERCISES: CPT

## 2018-06-18 PROCEDURE — 97140 MANUAL THERAPY 1/> REGIONS: CPT

## 2018-06-18 ASSESSMENT — PAIN SCALES - GENERAL
PAINLEVEL_OUTOF10: 6
PAINLEVEL_OUTOF10: 7

## 2018-06-18 ASSESSMENT — PAIN DESCRIPTION - FREQUENCY
FREQUENCY: CONTINUOUS
FREQUENCY: CONTINUOUS

## 2018-06-18 ASSESSMENT — PAIN DESCRIPTION - DESCRIPTORS
DESCRIPTORS: ACHING
DESCRIPTORS: ACHING;SORE

## 2018-06-18 ASSESSMENT — PAIN DESCRIPTION - ORIENTATION
ORIENTATION: LEFT;ANTERIOR
ORIENTATION: LEFT

## 2018-06-18 ASSESSMENT — PAIN DESCRIPTION - LOCATION
LOCATION: ELBOW
LOCATION: SHOULDER

## 2018-06-18 ASSESSMENT — PAIN DESCRIPTION - PROGRESSION
CLINICAL_PROGRESSION: NOT CHANGED
CLINICAL_PROGRESSION: NOT CHANGED

## 2018-06-18 ASSESSMENT — PAIN DESCRIPTION - PAIN TYPE
TYPE: ACUTE PAIN
TYPE: ACUTE PAIN

## 2018-06-20 ENCOUNTER — APPOINTMENT (OUTPATIENT)
Dept: PHYSICAL THERAPY | Age: 53
End: 2018-06-20
Payer: OTHER GOVERNMENT

## 2018-06-22 ENCOUNTER — HOSPITAL ENCOUNTER (OUTPATIENT)
Dept: PHYSICAL THERAPY | Age: 53
Setting detail: THERAPIES SERIES
Discharge: HOME | End: 2018-06-22
Payer: OTHER GOVERNMENT

## 2018-06-22 ENCOUNTER — HOSPITAL ENCOUNTER (OUTPATIENT)
Dept: OCCUPATIONAL THERAPY | Age: 53
Setting detail: THERAPIES SERIES
Discharge: HOME | End: 2018-06-22
Payer: OTHER GOVERNMENT

## 2018-06-22 PROCEDURE — 97140 MANUAL THERAPY 1/> REGIONS: CPT

## 2018-06-22 PROCEDURE — 97110 THERAPEUTIC EXERCISES: CPT

## 2018-06-22 PROCEDURE — G0283 ELEC STIM OTHER THAN WOUND: HCPCS

## 2018-06-22 ASSESSMENT — PAIN DESCRIPTION - LOCATION
LOCATION: SHOULDER
LOCATION: ELBOW

## 2018-06-22 ASSESSMENT — PAIN DESCRIPTION - DESCRIPTORS: DESCRIPTORS: SORE

## 2018-06-22 ASSESSMENT — PAIN DESCRIPTION - PROGRESSION
CLINICAL_PROGRESSION: NOT CHANGED
CLINICAL_PROGRESSION: NOT CHANGED

## 2018-06-22 ASSESSMENT — PAIN DESCRIPTION - ORIENTATION
ORIENTATION: LEFT
ORIENTATION: LEFT

## 2018-06-22 ASSESSMENT — PAIN SCALES - GENERAL
PAINLEVEL_OUTOF10: 6
PAINLEVEL_OUTOF10: 6

## 2018-06-22 ASSESSMENT — PAIN DESCRIPTION - PAIN TYPE
TYPE: ACUTE PAIN
TYPE: ACUTE PAIN

## 2018-06-22 ASSESSMENT — PAIN DESCRIPTION - FREQUENCY: FREQUENCY: CONTINUOUS

## 2018-06-25 ENCOUNTER — HOSPITAL ENCOUNTER (OUTPATIENT)
Dept: OCCUPATIONAL THERAPY | Age: 53
Setting detail: THERAPIES SERIES
Discharge: HOME | End: 2018-06-25
Payer: OTHER GOVERNMENT

## 2018-06-25 PROCEDURE — 97110 THERAPEUTIC EXERCISES: CPT

## 2018-06-25 ASSESSMENT — PAIN DESCRIPTION - LOCATION: LOCATION: ELBOW

## 2018-06-25 ASSESSMENT — PAIN DESCRIPTION - FREQUENCY: FREQUENCY: CONTINUOUS

## 2018-06-25 ASSESSMENT — PAIN DESCRIPTION - PROGRESSION: CLINICAL_PROGRESSION: NOT CHANGED

## 2018-06-25 ASSESSMENT — PAIN DESCRIPTION - ORIENTATION: ORIENTATION: LEFT

## 2018-06-25 ASSESSMENT — PAIN DESCRIPTION - DESCRIPTORS: DESCRIPTORS: SORE

## 2018-06-25 ASSESSMENT — PAIN SCALES - GENERAL: PAINLEVEL_OUTOF10: 6

## 2018-06-25 ASSESSMENT — PAIN DESCRIPTION - PAIN TYPE: TYPE: ACUTE PAIN

## 2018-06-27 ENCOUNTER — HOSPITAL ENCOUNTER (OUTPATIENT)
Dept: OCCUPATIONAL THERAPY | Age: 53
Setting detail: THERAPIES SERIES
Discharge: HOME | End: 2018-06-27
Payer: OTHER GOVERNMENT

## 2018-06-27 PROCEDURE — 97110 THERAPEUTIC EXERCISES: CPT

## 2018-06-27 ASSESSMENT — PAIN DESCRIPTION - ORIENTATION: ORIENTATION: LEFT

## 2018-06-27 ASSESSMENT — 9 HOLE PEG TEST: TESTTIME_SECONDS: 27

## 2018-06-27 ASSESSMENT — PAIN DESCRIPTION - PROGRESSION: CLINICAL_PROGRESSION: NOT CHANGED

## 2018-06-27 ASSESSMENT — PAIN DESCRIPTION - PAIN TYPE: TYPE: ACUTE PAIN

## 2018-06-27 ASSESSMENT — PAIN DESCRIPTION - FREQUENCY: FREQUENCY: CONTINUOUS

## 2018-06-27 ASSESSMENT — PAIN DESCRIPTION - LOCATION: LOCATION: ELBOW

## 2018-06-27 ASSESSMENT — PAIN SCALES - GENERAL: PAINLEVEL_OUTOF10: 6

## 2018-06-27 ASSESSMENT — PAIN DESCRIPTION - DESCRIPTORS: DESCRIPTORS: SORE;ACHING

## 2018-06-28 ENCOUNTER — HOSPITAL ENCOUNTER (OUTPATIENT)
Dept: PHYSICAL THERAPY | Age: 53
Setting detail: THERAPIES SERIES
Discharge: HOME | End: 2018-06-28
Payer: OTHER GOVERNMENT

## 2018-06-28 PROCEDURE — 97140 MANUAL THERAPY 1/> REGIONS: CPT

## 2018-06-28 PROCEDURE — 97110 THERAPEUTIC EXERCISES: CPT

## 2018-06-28 PROCEDURE — G0283 ELEC STIM OTHER THAN WOUND: HCPCS

## 2018-06-28 ASSESSMENT — PAIN DESCRIPTION - PROGRESSION
CLINICAL_PROGRESSION: NOT CHANGED
CLINICAL_PROGRESSION: NOT CHANGED

## 2018-06-28 ASSESSMENT — PAIN DESCRIPTION - LOCATION: LOCATION: SHOULDER

## 2018-06-28 ASSESSMENT — PAIN DESCRIPTION - PAIN TYPE
TYPE: ACUTE PAIN
TYPE: ACUTE PAIN

## 2018-06-28 ASSESSMENT — PAIN DESCRIPTION - FREQUENCY: FREQUENCY: CONTINUOUS

## 2018-06-28 ASSESSMENT — PAIN SCALES - GENERAL
PAINLEVEL_OUTOF10: 6
PAINLEVEL_OUTOF10: 6

## 2018-06-28 ASSESSMENT — PAIN DESCRIPTION - DESCRIPTORS: DESCRIPTORS: SORE;ACHING

## 2018-06-28 ASSESSMENT — PAIN DESCRIPTION - ORIENTATION: ORIENTATION: LEFT

## 2018-06-29 ENCOUNTER — HOSPITAL ENCOUNTER (OUTPATIENT)
Dept: PHYSICAL THERAPY | Age: 53
Setting detail: THERAPIES SERIES
Discharge: HOME | End: 2018-06-29
Payer: OTHER GOVERNMENT

## 2018-06-29 PROCEDURE — 97110 THERAPEUTIC EXERCISES: CPT

## 2018-06-29 PROCEDURE — G0283 ELEC STIM OTHER THAN WOUND: HCPCS

## 2018-06-29 PROCEDURE — 97140 MANUAL THERAPY 1/> REGIONS: CPT

## 2018-06-29 ASSESSMENT — PAIN SCALES - GENERAL: PAINLEVEL_OUTOF10: 8

## 2018-06-29 ASSESSMENT — PAIN DESCRIPTION - PAIN TYPE: TYPE: ACUTE PAIN

## 2018-06-29 ASSESSMENT — PAIN DESCRIPTION - ORIENTATION: ORIENTATION: LEFT

## 2018-06-29 ASSESSMENT — PAIN DESCRIPTION - PROGRESSION: CLINICAL_PROGRESSION: NOT CHANGED

## 2018-06-29 ASSESSMENT — PAIN DESCRIPTION - LOCATION: LOCATION: SHOULDER

## 2018-07-02 ENCOUNTER — HOSPITAL ENCOUNTER (OUTPATIENT)
Dept: PHYSICAL THERAPY | Age: 53
Setting detail: THERAPIES SERIES
Discharge: HOME | End: 2018-07-02
Payer: OTHER GOVERNMENT

## 2018-07-02 PROCEDURE — 97140 MANUAL THERAPY 1/> REGIONS: CPT

## 2018-07-02 PROCEDURE — G0283 ELEC STIM OTHER THAN WOUND: HCPCS

## 2018-07-02 PROCEDURE — 97110 THERAPEUTIC EXERCISES: CPT

## 2018-07-02 ASSESSMENT — PAIN DESCRIPTION - PAIN TYPE: TYPE: ACUTE PAIN

## 2018-07-02 ASSESSMENT — PAIN SCALES - GENERAL: PAINLEVEL_OUTOF10: 6

## 2018-07-02 ASSESSMENT — PAIN DESCRIPTION - LOCATION: LOCATION: SHOULDER

## 2018-07-02 ASSESSMENT — PAIN DESCRIPTION - ORIENTATION: ORIENTATION: LEFT

## 2018-07-02 ASSESSMENT — PAIN DESCRIPTION - PROGRESSION: CLINICAL_PROGRESSION: NOT CHANGED
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
## 2018-07-06 ENCOUNTER — HOSPITAL ENCOUNTER (OUTPATIENT)
Dept: PHYSICAL THERAPY | Age: 53
Setting detail: THERAPIES SERIES
Discharge: HOME | End: 2018-07-06
Payer: OTHER GOVERNMENT

## 2018-07-06 PROCEDURE — 97140 MANUAL THERAPY 1/> REGIONS: CPT

## 2018-07-06 PROCEDURE — G0283 ELEC STIM OTHER THAN WOUND: HCPCS

## 2018-07-06 PROCEDURE — 97110 THERAPEUTIC EXERCISES: CPT

## 2018-07-06 ASSESSMENT — PAIN SCALES - GENERAL: PAINLEVEL_OUTOF10: 6

## 2018-07-06 ASSESSMENT — PAIN DESCRIPTION - LOCATION: LOCATION: SHOULDER

## 2018-07-06 ASSESSMENT — PAIN DESCRIPTION - PROGRESSION: CLINICAL_PROGRESSION: NOT CHANGED

## 2018-07-06 ASSESSMENT — PAIN DESCRIPTION - PAIN TYPE: TYPE: ACUTE PAIN

## 2018-07-06 ASSESSMENT — PAIN DESCRIPTION - ORIENTATION: ORIENTATION: LEFT

## 2018-07-06 NOTE — PROGRESS NOTES
[address]   Outpatient Physical Therapy  [address] [employer]. [address]  Phone: [phone]  Fax: [phone]  Daily Progress Note    Date: 18    Patient Name: Markel Kinsey        MRN: [id number]  Account: [de-identified] : 1965      General Information:  Referring Practitioner: Jadiel Treviño  Referral Date : 18  Diagnosis: Left Frozen shoulder  Onset Date: 17  PT Insurance Information:  [facility]   Total # of Visits Approved: 12  Total # of Visits to Date: 9  Plan of Care/Certification Expiration Date: 18  No Show: 0  Canceled Appointment: 0    Subjective:  Subjective: patient reports massage therapy has been helping some especially swith upper trap/neck pain. Pain:  Patient Currently in Pain: Yes  Pain Assessment: 0-10  Pain Level: 6  Pain Type: Acute pain  Pain Location: Shoulder  Pain Orientation: Left  Clinical Progression: Not changed       Objective:  Exercise 1: lat pull dows 45# 15x2  Exercise 2: pulleys 3'  Exercise 3: supine ABC 3# 15x3  Exercise 4: supine scap punch 3# 15x3  Exercise 5: rows/pull downs 25# 10x3  Exercise 7: PROM left shoulder into flexion, abduction, IR and ER   Exercise 8: cane seated/supine flex/abd/ER/extension  Exercise 9: houghtons 0 # 10x2  Exercise 10: bench press cane 10x3  Exercise 11: scaption 2# CW/CCW 10x  Exercise 12: I,T,Y 2# 10x2     Moist heat: 15 min to left shoulder sitting with pillow under left elbow  E-stim (parameters): 15 min IFC to left shouder     Assessment: Body structures, Functions, Activity limitations: Decreased functional mobility ; Decreased ADL status; Decreased ROM; Decreased strength;Decreased high-level IADLs  Assessment: Flexion measured at 150 degrees seated, and abduction measured at 68 degrees seated. progressed exercises this date to patient tolerance, noting mulotiple times patient states \"it keeps grinding and catching\".  spent approximately 15min in PROM to help alleviate pain and increase range.  Treatment Diagnosis: Left Shoulder pain   Prognosis: Good  REQUIRES PT FOLLOW UP: Yes  Discharge Recommendations: Home independently  Activity Tolerance: Patient limited by pain    Plan:  Plan: Continue with current plan    Therapy Time:  Time In: 1245  Time Out: 1345  Minutes: 60  Timed Code Treatment Minutes: 55 Minutes    Treatment Charges: Minutes Units   []  Ultrasound     [x]  Electrical-Stim 15 1   []  Iontophoresis     []  Traction     []  Massage       []  Eval     []  Gait     []  Vasopneumatic Device     [x]  Ther Exercise 30  2   [x]  Manual Therapy 15 1   []  Ther Activities       []  Aquatics     []  Neuro Re-Ed       []  Other       Total Treatment Time: 54 Ådalen 30, PTA

## 2018-07-11 ENCOUNTER — HOSPITAL ENCOUNTER (OUTPATIENT)
Dept: PHYSICAL THERAPY | Age: 53
Setting detail: THERAPIES SERIES
Discharge: HOME | End: 2018-07-11
Payer: OTHER GOVERNMENT

## 2018-07-11 PROCEDURE — G0283 ELEC STIM OTHER THAN WOUND: HCPCS

## 2018-07-11 PROCEDURE — 97110 THERAPEUTIC EXERCISES: CPT

## 2018-07-11 PROCEDURE — 97140 MANUAL THERAPY 1/> REGIONS: CPT

## 2018-07-11 ASSESSMENT — PAIN DESCRIPTION - ORIENTATION: ORIENTATION: LEFT

## 2018-07-11 ASSESSMENT — PAIN SCALES - GENERAL: PAINLEVEL_OUTOF10: 7

## 2018-07-11 ASSESSMENT — PAIN DESCRIPTION - PAIN TYPE: TYPE: ACUTE PAIN

## 2018-07-11 ASSESSMENT — PAIN DESCRIPTION - LOCATION: LOCATION: SHOULDER

## 2018-07-11 ASSESSMENT — PAIN DESCRIPTION - PROGRESSION: CLINICAL_PROGRESSION: NOT CHANGED

## 2018-07-13 ENCOUNTER — HOSPITAL ENCOUNTER (OUTPATIENT)
Dept: PHYSICAL THERAPY | Age: 53
Setting detail: THERAPIES SERIES
Discharge: HOME | End: 2018-07-13
Payer: OTHER GOVERNMENT

## 2018-07-13 PROCEDURE — G0283 ELEC STIM OTHER THAN WOUND: HCPCS

## 2018-07-13 PROCEDURE — 97140 MANUAL THERAPY 1/> REGIONS: CPT

## 2018-07-13 PROCEDURE — 97110 THERAPEUTIC EXERCISES: CPT

## 2018-07-13 ASSESSMENT — PAIN DESCRIPTION - LOCATION: LOCATION: SHOULDER

## 2018-07-13 ASSESSMENT — PAIN DESCRIPTION - PROGRESSION: CLINICAL_PROGRESSION: NOT CHANGED

## 2018-07-13 ASSESSMENT — PAIN DESCRIPTION - ORIENTATION: ORIENTATION: LEFT

## 2018-07-13 ASSESSMENT — PAIN SCALES - GENERAL: PAINLEVEL_OUTOF10: 8

## 2018-07-13 ASSESSMENT — PAIN DESCRIPTION - PAIN TYPE: TYPE: ACUTE PAIN

## 2018-07-16 ENCOUNTER — HOSPITAL ENCOUNTER (OUTPATIENT)
Dept: PHYSICAL THERAPY | Age: 53
Setting detail: THERAPIES SERIES
Discharge: HOME | End: 2018-07-16
Payer: OTHER GOVERNMENT

## 2018-07-16 PROCEDURE — 97110 THERAPEUTIC EXERCISES: CPT

## 2018-07-16 PROCEDURE — 97140 MANUAL THERAPY 1/> REGIONS: CPT

## 2018-07-16 PROCEDURE — G0283 ELEC STIM OTHER THAN WOUND: HCPCS

## 2018-07-16 PROCEDURE — 97016 VASOPNEUMATIC DEVICE THERAPY: CPT

## 2018-07-16 ASSESSMENT — PAIN DESCRIPTION - PAIN TYPE: TYPE: ACUTE PAIN

## 2018-07-16 ASSESSMENT — PAIN SCALES - GENERAL: PAINLEVEL_OUTOF10: 5

## 2018-07-16 ASSESSMENT — PAIN DESCRIPTION - PROGRESSION: CLINICAL_PROGRESSION: NOT CHANGED

## 2018-07-18 ENCOUNTER — HOSPITAL ENCOUNTER (OUTPATIENT)
Dept: PHYSICAL THERAPY | Age: 53
Setting detail: THERAPIES SERIES
Discharge: HOME | End: 2018-07-18
Payer: OTHER GOVERNMENT

## 2018-07-18 PROCEDURE — 97110 THERAPEUTIC EXERCISES: CPT

## 2018-07-18 PROCEDURE — 97140 MANUAL THERAPY 1/> REGIONS: CPT

## 2018-07-18 ASSESSMENT — PAIN DESCRIPTION - PROGRESSION: CLINICAL_PROGRESSION: NOT CHANGED

## 2018-07-18 ASSESSMENT — PAIN DESCRIPTION - LOCATION: LOCATION: SHOULDER

## 2018-07-18 ASSESSMENT — PAIN DESCRIPTION - PAIN TYPE: TYPE: ACUTE PAIN

## 2018-07-18 ASSESSMENT — PAIN DESCRIPTION - ORIENTATION: ORIENTATION: LEFT

## 2018-07-18 ASSESSMENT — PAIN SCALES - GENERAL: PAINLEVEL_OUTOF10: 6

## 2018-07-18 NOTE — PROGRESS NOTES
[address]   Outpatient Physical Therapy  [address] [facility]. [address]  Phone: [phone]  Fax: [phone]  Daily Progress Note    Date: 18    Patient Name: Ivory Thomason        MRN: [id number]  Account: [de-identified] : 1965      General Information:  Referring Practitioner: Porsha Nguyen  Referral Date : 18  Diagnosis: Left Frozen shoulder  Onset Date: 17  PT Insurance Information:  [facility]   Total # of Visits Approved: 12  Total # of Visits to Date: 12  Plan of Care/Certification Expiration Date: 18  No Show: 0  Canceled Appointment: 0    Subjective:  Subjective: MRI taken yesterday along with patient seeing physician tomorrow in order to get another physical therapy referal.     Pain:  Patient Currently in Pain: Yes  Pain Assessment: 0-10  Pain Level: 6  Pain Type: Acute pain  Pain Location: Shoulder  Pain Orientation: Left  Clinical Progression: Not changed       Objective:  Exercise 1: lat pull dows 15# 15x2  Exercise 2: pulleys 3'  Exercise 3: supine ABC 3# 15x3  Exercise 4: supine scap punch 3# 15x3  Exercise 5: rows/pull downs 25# 10x3  Exercise 6: chest press/reverse press 20#  Exercise 7: PROM left shoulder into flexion, abduction, IR and ER   Exercise 8: cane seated/supine flex/abd/ER/extension  Exercise 9: houghtons 0 # 10x2  Exercise 10: bench press cane 10x3  Exercise 11: scaption 2# CW/CCW 10x  Exercise 12: I,T,Y 2# 10x2     Cryotherapy (Minutes\Location): 15 min With compresion  E-stim (parameters): 15 min IFC to left shouder held 18    Assessment: Body structures, Functions, Activity limitations: Decreased functional mobility ; Decreased ADL status; Decreased ROM; Decreased strength;Decreased high-level IADLs  Assessment: Continued with progressive exercises patient had no increased pain symptoms this date.    Treatment Diagnosis: Left Shoulder pain   Prognosis: Good  REQUIRES PT FOLLOW UP: Yes  Discharge Recommendations: Home

## 2018-08-15 PROBLEM — F43.23 ADJUSTMENT REACTION WITH ANXIETY AND DEPRESSION: Status: ACTIVE | Noted: 2018-08-15

## 2018-09-13 ENCOUNTER — HOSPITAL ENCOUNTER (OUTPATIENT)
Dept: PHYSICAL THERAPY | Age: 53
Setting detail: THERAPIES SERIES
Discharge: HOME | End: 2018-09-13
Payer: OTHER GOVERNMENT

## 2018-09-13 PROCEDURE — 97110 THERAPEUTIC EXERCISES: CPT

## 2018-09-13 PROCEDURE — 97161 PT EVAL LOW COMPLEX 20 MIN: CPT

## 2018-09-13 ASSESSMENT — PAIN DESCRIPTION - LOCATION: LOCATION: SHOULDER

## 2018-09-13 ASSESSMENT — PAIN DESCRIPTION - PAIN TYPE: TYPE: CHRONIC PAIN

## 2018-09-13 ASSESSMENT — PAIN DESCRIPTION - PROGRESSION: CLINICAL_PROGRESSION: GRADUALLY WORSENING

## 2018-09-13 ASSESSMENT — PAIN DESCRIPTION - ORIENTATION: ORIENTATION: LEFT;ANTERIOR;PROXIMAL

## 2018-09-13 ASSESSMENT — PAIN SCALES - GENERAL: PAINLEVEL_OUTOF10: 5

## 2018-09-13 ASSESSMENT — PAIN DESCRIPTION - DESCRIPTORS: DESCRIPTORS: ACHING

## 2018-09-13 ASSESSMENT — PAIN DESCRIPTION - FREQUENCY: FREQUENCY: CONTINUOUS

## 2018-09-13 ASSESSMENT — PAIN DESCRIPTION - ONSET: ONSET: ON-GOING

## 2018-09-17 ENCOUNTER — HOSPITAL ENCOUNTER (OUTPATIENT)
Dept: PHYSICAL THERAPY | Age: 53
Setting detail: THERAPIES SERIES
Discharge: HOME | End: 2018-09-17
Payer: OTHER GOVERNMENT

## 2018-09-17 PROCEDURE — 97110 THERAPEUTIC EXERCISES: CPT

## 2018-09-17 PROCEDURE — 97140 MANUAL THERAPY 1/> REGIONS: CPT

## 2018-09-17 ASSESSMENT — PAIN DESCRIPTION - FREQUENCY: FREQUENCY: INTERMITTENT

## 2018-09-17 ASSESSMENT — PAIN SCALES - GENERAL: PAINLEVEL_OUTOF10: 4

## 2018-09-17 ASSESSMENT — PAIN DESCRIPTION - ORIENTATION: ORIENTATION: LEFT;ANTERIOR

## 2018-09-17 ASSESSMENT — PAIN DESCRIPTION - ONSET: ONSET: ON-GOING

## 2018-09-17 ASSESSMENT — PAIN DESCRIPTION - DESCRIPTORS: DESCRIPTORS: ACHING;SORE;SHARP

## 2018-09-17 ASSESSMENT — PAIN DESCRIPTION - PROGRESSION: CLINICAL_PROGRESSION: GRADUALLY WORSENING

## 2018-09-17 ASSESSMENT — PAIN DESCRIPTION - PAIN TYPE: TYPE: CHRONIC PAIN

## 2018-09-17 ASSESSMENT — PAIN DESCRIPTION - LOCATION: LOCATION: SHOULDER

## 2018-09-17 NOTE — PROGRESS NOTES
min      Modalities  Cryotherapy (Minutes\Location): 15 min Left Shoulder sitting with pillow under left elbow   Assessment:   Conditions Requiring Skilled Therapeutic Intervention  Body structures, Functions, Activity limitations: Decreased functional mobility ; Decreased ADL status; Decreased ROM; Decreased strength;Decreased high-level IADLs  Assessment: The patient should do well with PT 3 x week for left shoulder pain. Will focus on PROM of left shoulder  Treatment Diagnosis: Left Shoulder pain   Prognosis: Good  Patient Education: HEP see above list.   REQUIRES PT FOLLOW UP: Yes  Discharge Recommendations: Home independently  Activity Tolerance  Activity Tolerance: Patient limited by pain      Goals:  Short term goals  Time Frame for Short term goals: 2 Weeks   Short term goal 1: OPTIMAL score of left arm is 12/3 at the time of his evaluation, his goal is 9/3. Short term goal 2: Worst pain 10/10 at time of his evaluation, his goal is 0/10. Short term goal 3: Independant with his home program.   Long term goals  Time Frame for Long term goals : 4 weeks   Long term goal 1: OPTIMAL score of 6/3 at the time of his discharge.    Patient Goals   Patient goals : Use left arm without pain     Plan:     Continue   Timed Code Treatment Minutes: 45 Minutes  Total Treatment Time: 45  Frequency and duration of TX  Days: 3  Weeks: 4     Therapy Time   Individual Concurrent Group Co-treatment   Time In  1700         Time Out  1745         Minutes  45 Total   10 min Therx  20 min MRD  15 min  CP - NC         Timed Code Treatment Minutes: 70 Nelson Faye, PT

## 2018-09-19 ENCOUNTER — HOSPITAL ENCOUNTER (OUTPATIENT)
Dept: PHYSICAL THERAPY | Age: 53
Setting detail: THERAPIES SERIES
Discharge: HOME | End: 2018-09-19
Payer: OTHER GOVERNMENT

## 2018-09-19 PROCEDURE — 97140 MANUAL THERAPY 1/> REGIONS: CPT

## 2018-09-19 PROCEDURE — 97110 THERAPEUTIC EXERCISES: CPT

## 2018-09-19 ASSESSMENT — PAIN DESCRIPTION - DESCRIPTORS: DESCRIPTORS: ACHING

## 2018-09-19 ASSESSMENT — PAIN SCALES - GENERAL: PAINLEVEL_OUTOF10: 4

## 2018-09-19 ASSESSMENT — PAIN DESCRIPTION - ONSET: ONSET: ON-GOING

## 2018-09-19 ASSESSMENT — PAIN DESCRIPTION - FREQUENCY: FREQUENCY: INTERMITTENT

## 2018-09-19 ASSESSMENT — PAIN DESCRIPTION - ORIENTATION: ORIENTATION: LEFT;ANTERIOR

## 2018-09-19 ASSESSMENT — PAIN DESCRIPTION - PAIN TYPE: TYPE: CHRONIC PAIN

## 2018-09-19 ASSESSMENT — PAIN DESCRIPTION - LOCATION: LOCATION: SHOULDER

## 2018-09-19 ASSESSMENT — PAIN DESCRIPTION - PROGRESSION: CLINICAL_PROGRESSION: GRADUALLY WORSENING

## 2018-09-19 NOTE — PROGRESS NOTES
IR  Exercise 4: PROM 15 min L UE IR-ER- Flex- Abd  Exercise 5: AROM left UE punches, flexion(Supine), abduction(side laying), 15 x   Exercise 6: HEP Ice 15 min   Exercise 7: add UBC/cane stretches      Modalities  Cryotherapy (Minutes\Location): 15 min Left Shoulder sitting with pillow under left elbow         Assessment:   Conditions Requiring Skilled Therapeutic Intervention  Body structures, Functions, Activity limitations: Decreased functional mobility ; Decreased ADL status; Decreased ROM; Decreased strength;Decreased high-level IADLs  Assessment: PROM of left shoulder is painful without catching during flexion or abduction. Treatment Diagnosis: Left Shoulder pain   Prognosis: Good  Patient Education: HEP see above list.   REQUIRES PT FOLLOW UP: Yes  Discharge Recommendations: Home independently  Activity Tolerance  Activity Tolerance: Patient limited by pain                   Goals:  Short term goals  Time Frame for Short term goals: 2 Weeks   Short term goal 1: OPTIMAL score of left arm is 12/3 at the time of his evaluation, his goal is 9/3. Short term goal 2: Worst pain 10/10 at time of his evaluation, his goal is 0/10. Short term goal 3: Independant with his home program.   Long term goals  Time Frame for Long term goals : 4 weeks   Long term goal 1: OPTIMAL score of 6/3 at the time of his discharge.    Patient Goals   Patient goals : Use left arm without pain     Plan:     Continue   Timed Code Treatment Minutes: 45 Minutes  Total Treatment Time: 60  Frequency and duration of TX  Days: 3  Weeks: 4     Therapy Time   Individual Concurrent Group Co-treatment   Time In  1730         Time Out  1830         Minutes  60 min  Total  30 min Therx  15 min MRD  15 min CP-N/C         Timed Code Treatment Minutes: 70 Nelson Karimi, PT

## 2018-09-21 ENCOUNTER — HOSPITAL ENCOUNTER (OUTPATIENT)
Dept: PHYSICAL THERAPY | Age: 53
Setting detail: THERAPIES SERIES
Discharge: HOME | End: 2018-09-21
Payer: OTHER GOVERNMENT

## 2018-09-21 PROCEDURE — 97140 MANUAL THERAPY 1/> REGIONS: CPT

## 2018-09-21 PROCEDURE — 97110 THERAPEUTIC EXERCISES: CPT

## 2018-09-21 ASSESSMENT — PAIN DESCRIPTION - DESCRIPTORS: DESCRIPTORS: ACHING

## 2018-09-21 ASSESSMENT — PAIN SCALES - GENERAL: PAINLEVEL_OUTOF10: 5

## 2018-09-21 ASSESSMENT — PAIN DESCRIPTION - PAIN TYPE: TYPE: CHRONIC PAIN

## 2018-09-21 ASSESSMENT — PAIN DESCRIPTION - PROGRESSION: CLINICAL_PROGRESSION: GRADUALLY WORSENING

## 2018-09-21 ASSESSMENT — PAIN DESCRIPTION - FREQUENCY: FREQUENCY: INTERMITTENT

## 2018-09-21 ASSESSMENT — PAIN DESCRIPTION - ONSET: ONSET: ON-GOING

## 2018-09-21 ASSESSMENT — PAIN DESCRIPTION - ORIENTATION: ORIENTATION: LEFT;ANTERIOR

## 2018-09-21 ASSESSMENT — PAIN DESCRIPTION - LOCATION: LOCATION: SHOULDER

## 2018-09-21 NOTE — PROGRESS NOTES
[address] [facility]   Outpatient Physical Therapy  [address] [place] [address]. [address]  Phone: [phone]  Fax: [phone]  Daily Progress Note    Date: 18    Patient Name: Alexandra Nation        MRN: [id number]  Account: [de-identified] : 1965      General Information:  Referring Practitioner: Lana Diaz  Referral Date : 09/10/18  Diagnosis: Left Frozen shoulder  Onset Date: 17  PT Insurance Information:  [facility] [employer]   Total # of Visits Approved: 10 (32 units )  Total # of Visits to Date: 4  Plan of Care/Certification Expiration Date: 10/10/18  No Show: 0  Canceled Appointment: 0  Progress Note Counter: 4/10 visits     Subjective:  Subjective: no new complaints      Pain:  Patient Currently in Pain: Yes  Pain Assessment: 0-10  Pain Level: 5  Pain Type: Chronic pain  Pain Location: Shoulder  Pain Orientation: Left; Anterior  Pain Descriptors: Aching  Pain Frequency: Intermittent  Pain Onset: On-going  Clinical Progression: Gradually worsening  Response to Pain Intervention: None       Objective:  Exercise 1: Review HEP Table slides 10 x   Exercise 2: Review HEP Codmans CCW/CW F/B S/S  Exercise 3: Pulley 7 min Flex/ADB/ IR  Exercise 4: PROM 15 min L UE IR-ER- Flex- Abd  Exercise 5: AROM left UE punches, flexion(Supine), abduction(side laying), 15 x   Exercise 6: HEP Ice 15 min   Exercise 7: add UBC/cane streches   Exercise 8: Body blade 15\" x3 abd/flex/ext    Assessment: Body structures, Functions, Activity limitations: Decreased functional mobility ; Decreased ADL status; Decreased ROM; Decreased strength;Decreased high-level IADLs  Assessment: PROM caused increased pain but subsided after ice.    Treatment Diagnosis: Left Shoulder pain   Prognosis: Good  REQUIRES PT FOLLOW UP: Yes  Activity Tolerance: Patient limited by pain    Plan:  Plan: Continue with current plan    Therapy Time:  Time In: 0800  Time Out: 0845  Minutes: 45  Timed Code Treatment Minutes: 35 Minutes    Treatment Charges:

## 2018-09-24 ENCOUNTER — HOSPITAL ENCOUNTER (OUTPATIENT)
Dept: PHYSICAL THERAPY | Age: 53
Setting detail: THERAPIES SERIES
Discharge: HOME | End: 2018-09-24
Payer: OTHER GOVERNMENT

## 2018-09-26 ENCOUNTER — HOSPITAL ENCOUNTER (OUTPATIENT)
Dept: PHYSICAL THERAPY | Age: 53
Setting detail: THERAPIES SERIES
Discharge: HOME | End: 2018-09-26
Payer: OTHER GOVERNMENT

## 2018-09-26 PROCEDURE — 97110 THERAPEUTIC EXERCISES: CPT

## 2018-09-26 PROCEDURE — 97140 MANUAL THERAPY 1/> REGIONS: CPT

## 2018-09-26 ASSESSMENT — PAIN DESCRIPTION - PROGRESSION: CLINICAL_PROGRESSION: GRADUALLY WORSENING

## 2018-09-26 ASSESSMENT — PAIN SCALES - GENERAL: PAINLEVEL_OUTOF10: 5

## 2018-09-26 ASSESSMENT — PAIN DESCRIPTION - PAIN TYPE: TYPE: CHRONIC PAIN

## 2018-09-26 ASSESSMENT — PAIN DESCRIPTION - ORIENTATION: ORIENTATION: LEFT;ANTERIOR

## 2018-09-26 ASSESSMENT — PAIN DESCRIPTION - ONSET: ONSET: ON-GOING

## 2018-09-26 ASSESSMENT — PAIN DESCRIPTION - LOCATION: LOCATION: SHOULDER

## 2018-09-26 ASSESSMENT — PAIN DESCRIPTION - FREQUENCY: FREQUENCY: INTERMITTENT

## 2018-09-26 ASSESSMENT — PAIN DESCRIPTION - DESCRIPTORS: DESCRIPTORS: ACHING

## 2018-09-28 ENCOUNTER — HOSPITAL ENCOUNTER (OUTPATIENT)
Dept: PHYSICAL THERAPY | Age: 53
Setting detail: THERAPIES SERIES
Discharge: HOME | End: 2018-09-28
Payer: OTHER GOVERNMENT

## 2018-09-28 PROCEDURE — 97140 MANUAL THERAPY 1/> REGIONS: CPT

## 2018-09-28 ASSESSMENT — PAIN SCALES - GENERAL: PAINLEVEL_OUTOF10: 6

## 2018-09-28 ASSESSMENT — PAIN DESCRIPTION - PAIN TYPE: TYPE: CHRONIC PAIN

## 2018-09-28 ASSESSMENT — PAIN DESCRIPTION - FREQUENCY: FREQUENCY: INTERMITTENT

## 2018-09-28 ASSESSMENT — PAIN DESCRIPTION - ONSET: ONSET: ON-GOING

## 2018-09-28 ASSESSMENT — PAIN DESCRIPTION - DESCRIPTORS: DESCRIPTORS: ACHING

## 2018-09-28 ASSESSMENT — PAIN DESCRIPTION - PROGRESSION: CLINICAL_PROGRESSION: GRADUALLY WORSENING

## 2018-09-28 ASSESSMENT — PAIN DESCRIPTION - LOCATION: LOCATION: SHOULDER

## 2018-09-28 ASSESSMENT — PAIN DESCRIPTION - ORIENTATION: ORIENTATION: LEFT;ANTERIOR

## 2018-10-01 ENCOUNTER — HOSPITAL ENCOUNTER (OUTPATIENT)
Dept: PHYSICAL THERAPY | Age: 53
Setting detail: THERAPIES SERIES
Discharge: HOME | End: 2018-10-01
Payer: OTHER GOVERNMENT

## 2018-10-01 PROCEDURE — 97140 MANUAL THERAPY 1/> REGIONS: CPT

## 2018-10-01 ASSESSMENT — PAIN DESCRIPTION - ORIENTATION: ORIENTATION: LEFT;ANTERIOR

## 2018-10-01 ASSESSMENT — PAIN DESCRIPTION - PAIN TYPE: TYPE: CHRONIC PAIN

## 2018-10-01 ASSESSMENT — PAIN DESCRIPTION - LOCATION: LOCATION: SHOULDER

## 2018-10-01 ASSESSMENT — PAIN DESCRIPTION - PROGRESSION: CLINICAL_PROGRESSION: GRADUALLY WORSENING

## 2018-10-01 ASSESSMENT — PAIN SCALES - GENERAL: PAINLEVEL_OUTOF10: 7

## 2018-10-01 ASSESSMENT — PAIN DESCRIPTION - DESCRIPTORS: DESCRIPTORS: ACHING

## 2018-10-01 ASSESSMENT — PAIN DESCRIPTION - FREQUENCY: FREQUENCY: INTERMITTENT

## 2018-10-03 ENCOUNTER — HOSPITAL ENCOUNTER (OUTPATIENT)
Dept: PHYSICAL THERAPY | Age: 53
Setting detail: THERAPIES SERIES
Discharge: HOME | End: 2018-10-03
Payer: OTHER GOVERNMENT

## 2018-10-03 PROCEDURE — 97140 MANUAL THERAPY 1/> REGIONS: CPT

## 2018-10-03 PROCEDURE — 97110 THERAPEUTIC EXERCISES: CPT

## 2018-10-03 PROCEDURE — 97016 VASOPNEUMATIC DEVICE THERAPY: CPT

## 2018-10-03 ASSESSMENT — PAIN DESCRIPTION - LOCATION: LOCATION: SHOULDER

## 2018-10-03 ASSESSMENT — PAIN DESCRIPTION - PROGRESSION: CLINICAL_PROGRESSION: GRADUALLY WORSENING

## 2018-10-03 ASSESSMENT — PAIN DESCRIPTION - PAIN TYPE: TYPE: CHRONIC PAIN

## 2018-10-03 ASSESSMENT — PAIN SCALES - GENERAL: PAINLEVEL_OUTOF10: 2

## 2018-10-03 ASSESSMENT — PAIN DESCRIPTION - DESCRIPTORS: DESCRIPTORS: ACHING

## 2018-10-03 ASSESSMENT — PAIN DESCRIPTION - FREQUENCY: FREQUENCY: INTERMITTENT

## 2018-10-03 ASSESSMENT — PAIN DESCRIPTION - ORIENTATION: ORIENTATION: LEFT;ANTERIOR

## 2018-10-05 ENCOUNTER — HOSPITAL ENCOUNTER (OUTPATIENT)
Dept: PHYSICAL THERAPY | Age: 53
Setting detail: THERAPIES SERIES
Discharge: HOME | End: 2018-10-05
Payer: OTHER GOVERNMENT

## 2018-10-05 PROCEDURE — 97140 MANUAL THERAPY 1/> REGIONS: CPT

## 2018-10-05 ASSESSMENT — PAIN DESCRIPTION - PROGRESSION: CLINICAL_PROGRESSION: GRADUALLY WORSENING

## 2018-10-05 ASSESSMENT — PAIN DESCRIPTION - ORIENTATION: ORIENTATION: LEFT;ANTERIOR

## 2018-10-05 ASSESSMENT — PAIN SCALES - GENERAL: PAINLEVEL_OUTOF10: 5

## 2018-10-05 ASSESSMENT — PAIN DESCRIPTION - LOCATION: LOCATION: SHOULDER

## 2018-10-05 ASSESSMENT — PAIN DESCRIPTION - PAIN TYPE: TYPE: CHRONIC PAIN

## 2018-10-05 ASSESSMENT — PAIN DESCRIPTION - DESCRIPTORS: DESCRIPTORS: ACHING

## 2018-10-05 NOTE — PROGRESS NOTES
[address]   Outpatient Physical Therapy  [address] [facility]. [address]  Phone: [phone]  Fax: [phone]  Daily Progress Note    Date: 10/5/18    Patient Name: Riley Aiken        MRN: [id number]  Account: [de-identified] : 1965      General Information:  Referring Practitioner: Lizet George  Referral Date : 09/10/18  Diagnosis: Left Frozen shoulder  Onset Date: 17  PT Insurance Information:  [facility]   Total # of Visits Approved: 10 (32 units )  Total # of Visits to Date: 10  Plan of Care/Certification Expiration Date: 10/10/18  No Show: 0  Canceled Appointment: 1  Progress Note Counter: 10/10 visits     Subjective:  Subjective: Patient reports he is going back to doctor in order to get another referal      Pain:  Patient Currently in Pain: Yes  Pain Assessment: 0-10  Pain Level: 5  Pain Type: Chronic pain  Pain Location: Shoulder  Pain Orientation: Left; Anterior  Pain Descriptors: Aching  Clinical Progression: Gradually worsening  Response to Pain Intervention: None       Objective:  Exercise 1: Review HEP Table slides 10 x   Exercise 2: Review HEP Codmans CCW/CW F/B S/S  Exercise 3: Pulley 7 min Flex/ADB/ IR  Exercise 4: PROM 30 min L UE IR-ER- Flex- Abd  Exercise 5: UBC 6 min 3/3 min   Exercise 6: Added VC to left shoulder psst treatment 15 min      Cryotherapy (Minutes\Location): Vaso compresion 15 min is sittg in chair with pillow under left elbow. Assessment: Body structures, Functions, Activity limitations: Decreased functional mobility ; Decreased ADL status; Decreased ROM; Decreased strength;Decreased high-level IADLs  Assessment: PROM has increased end range motions with mild - mod pain. The use of Ice today for pain releif.    Treatment Diagnosis: Left Shoulder pain   Prognosis: Good  REQUIRES PT FOLLOW UP: Yes  Discharge Recommendations: Home independently  Activity Tolerance: Patient limited by pain    Plan:  Plan: Discharge (requires Re-Eval)    Therapy Time:  Time In: 0830  Time Out: 0915  Minutes: 45  Timed Code Treatment Minutes: 60 Minutes    Treatment Charges: Minutes Units   []  Ultrasound     []  Electrical-Stim     []  Iontophoresis     []  Traction     []  Massage       []  Eval     []  Gait     []  Vasopneumatic Device     []  Ther Exercise       [x]  Manual Therapy 30  2   []  Ther Activities       []  Aquatics     []  Neuro Re-Ed       []  Other       Total Treatment Time: 27 2       Ranjit Gamez PTA

## 2018-10-08 ENCOUNTER — APPOINTMENT (OUTPATIENT)
Dept: PHYSICAL THERAPY | Age: 53
End: 2018-10-08
Payer: OTHER GOVERNMENT

## 2018-10-10 ENCOUNTER — APPOINTMENT (OUTPATIENT)
Dept: PHYSICAL THERAPY | Age: 53
End: 2018-10-10
Payer: OTHER GOVERNMENT

## 2018-10-15 ASSESSMENT — PAIN DESCRIPTION - PROGRESSION: CLINICAL_PROGRESSION: GRADUALLY WORSENING

## 2018-10-15 ASSESSMENT — PAIN DESCRIPTION - DESCRIPTORS: DESCRIPTORS: ACHING

## 2018-10-15 ASSESSMENT — PAIN DESCRIPTION - PAIN TYPE: TYPE: CHRONIC PAIN

## 2018-10-15 ASSESSMENT — PAIN SCALES - GENERAL: PAINLEVEL_OUTOF10: 5

## 2018-10-15 ASSESSMENT — PAIN DESCRIPTION - ORIENTATION: ORIENTATION: LEFT;ANTERIOR

## 2018-10-15 ASSESSMENT — PAIN DESCRIPTION - LOCATION: LOCATION: SHOULDER

## 2018-10-15 NOTE — PROGRESS NOTES
Physical Therapy  Discharge Note  Date: 10/15/2018  Patient Name: Nash Gee  MRN: [id number]     :   1965    Subjective:   General  Referring Practitioner: Yvon Stern  PT Visit Information  Onset Date: 17  PT Insurance Information:  [facility] of [facility] [address]   Total # of Visits Approved: 10 (32 units )  Total # of Visits to Date: 10  Plan of Care/Certification Expiration Date: 10/10/18  No Show: 0  Canceled Appointment: 1  Progress Note Counter: 10/10 visits   Subjective  Subjective: Patient reports he is going back to doctor in order to get another referral   Pain Screening  Patient Currently in Pain: Yes  Pain Assessment  Pain Assessment: 0-10  Pain Level: 5  Pain Type: Chronic pain  Pain Location: Shoulder  Pain Orientation: Left; Anterior  Pain Descriptors: Aching  Clinical Progression: Gradually worsening  Response to Pain Intervention: None  Vital Signs  Patient Currently in Pain: Yes       Treatment Activities:          AROM LUE (degrees)  LUE AROM : Exceptions  LUE General AROM: limited by pain   L Shoulder Flexion 0-180: 0-160  L Shoulder ABduction 0-180: 0-120  L Shoulder Int Rotation  0-70: 0-65  L Shoulder Ext Rotation  0-90: 0-85  Strength LUE  Strength LUE: Exception  Comment: limited by pain   L Shoulder Flexion: 2+/5;3-/5  L Shoulder ABduction: 2+/5;3-/5  L Shoulder Internal Rotation: 2+/5;3-/5  L Shoulder External Rotation: 2+/5;3-/5  L Elbow Flexion: 2+/5;3-/5  L Elbow Extension: 2+/5;3-/5    Exercises  Exercise 1: Review HEP Table slides 10 x   Exercise 2: Review HEP Codmans CCW/CW F/B S/S  Exercise 3: Pulley 7 min Flex/ADB/ IR  Exercise 4: PROM 30 min L UE IR-ER- Flex- Abd  Exercise 5: UBC 6 min 3/3 min   Exercise 6: Added VC to left shoulder post treatment 15 min      Modalities  Cryotherapy (Minutes\Location): Vaso compression 15 min is sit in chair with pillow under left elbow.        Assessment:   Conditions Requiring Skilled Therapeutic Intervention  Body structures, Functions,

## 2018-11-07 ENCOUNTER — HOSPITAL ENCOUNTER (OUTPATIENT)
Dept: PHYSICAL THERAPY | Age: 53
Setting detail: THERAPIES SERIES
Discharge: HOME | End: 2018-11-07
Payer: OTHER GOVERNMENT

## 2018-11-07 PROCEDURE — 97110 THERAPEUTIC EXERCISES: CPT

## 2018-11-07 PROCEDURE — 97164 PT RE-EVAL EST PLAN CARE: CPT

## 2018-11-07 ASSESSMENT — PAIN DESCRIPTION - LOCATION: LOCATION: SHOULDER

## 2018-11-07 ASSESSMENT — PAIN SCALES - GENERAL: PAINLEVEL_OUTOF10: 4

## 2018-11-07 ASSESSMENT — PAIN DESCRIPTION - PAIN TYPE: TYPE: CHRONIC PAIN

## 2018-11-07 ASSESSMENT — PAIN DESCRIPTION - ORIENTATION: ORIENTATION: LEFT;ANTERIOR

## 2018-11-07 ASSESSMENT — PAIN DESCRIPTION - DESCRIPTORS: DESCRIPTORS: ACHING

## 2018-11-07 NOTE — PROGRESS NOTES
Exercise 6 BICEP CURLS NEXT SESSION   Exercise 7 WALL PUSH UPS NEXT SESSION   Exercise 8 JEFF 3 DIRECTIONS NEXT SESSION   Exercise 9 SHAPE TRACING NEXT SESSION   Patient Goals    Patient goals  RTW, USE ARM WITHOUT PAIN   Short term goals   Time Frame for Short term goals 3-4 WEEKS   Short term goal 1 INDEP HOME EXERCISE PROGRAM   Short term goal 2 DECREASE PAIN TO NO GREATER THAN 4/10 WITH ACTIVITY   Short term goal 3 FULL ACTIVE SHOULDER ELEVATION    Short term goal 4 INCREASE STRENGTH 1/S GRADE   Short term goal 5 IMPROVE UEFI (47/80) BY 20   Conditions Requiring Skilled Therapeutic Intervention   Body structures, Functions, Activity limitations Decreased strength;Decreased ROM; Decreased functional mobility    Assessment WNL/EXCESSIVE PROM OF LEFT SHOULDER,  VERY WEAK, UNABLE TO COMPLETE ROM DUE TO WEAKNESS NOT RESTRICTIONS   Treatment Diagnosis ADHESIVE CAPSULITIS LEFT SHOULDER,   MUSCLE STRAIN LE SHOULDER   Prognosis Fair  (LONG STANDING PROBLEM, SUPRASPINATUS VERY WEEK, MAY HAVE MODERATE TEAR)   Patient Education POC, HEP, NEED TO FOCUS ON STRENGTHENING   Activity Tolerance   Activity Tolerance Patient Tolerated treatment well   Comments ENCOURAGED TO ICE PRN AT [address] [facility] [address] current plan   Comment RESUME THERAPY, FOCUS ON SCAPULAR STABILIZATION AND CUFF STRENGTHENING   Frequency and duration of tx   Days 3   Weeks 3   Therapy Session  PT Individual Minutes  Time In: 0845  Time Out: 0930  Minutes: 45    Treatment Charges: Minutes Units   []  Ultrasound     []  Electrical-Stim     []  Iontophoresis     []  Traction     []  Massage       [x]  RE Eval 20 1   []  Gait     [x]  Ther Exercise 25  2    []  Manual Therapy       []  Ther Activities       []  Aquatics     []  Vasopneumatic Device     []  Neuro Re-Ed       []  Other       Total Treatment Time: 39 3    Electronically signed by Jamee Mejia PT on 11/7/2018 at 6:16 PM

## 2018-11-12 ENCOUNTER — HOSPITAL ENCOUNTER (OUTPATIENT)
Dept: PHYSICAL THERAPY | Age: 53
Setting detail: THERAPIES SERIES
Discharge: HOME | End: 2018-11-12
Payer: OTHER GOVERNMENT

## 2018-11-12 PROCEDURE — 97140 MANUAL THERAPY 1/> REGIONS: CPT

## 2018-11-12 PROCEDURE — 97110 THERAPEUTIC EXERCISES: CPT

## 2018-11-14 ENCOUNTER — HOSPITAL ENCOUNTER (OUTPATIENT)
Dept: PHYSICAL THERAPY | Age: 53
Setting detail: THERAPIES SERIES
Discharge: HOME | End: 2018-11-14
Payer: OTHER GOVERNMENT

## 2018-11-14 PROCEDURE — 97110 THERAPEUTIC EXERCISES: CPT

## 2018-11-14 ASSESSMENT — PAIN DESCRIPTION - ORIENTATION: ORIENTATION: LEFT

## 2018-11-14 ASSESSMENT — PAIN SCALES - GENERAL: PAINLEVEL_OUTOF10: 4

## 2018-11-14 ASSESSMENT — PAIN DESCRIPTION - LOCATION: LOCATION: SHOULDER

## 2018-11-14 ASSESSMENT — PAIN DESCRIPTION - PAIN TYPE: TYPE: CHRONIC PAIN

## 2018-11-16 ENCOUNTER — HOSPITAL ENCOUNTER (OUTPATIENT)
Dept: PHYSICAL THERAPY | Age: 53
Setting detail: THERAPIES SERIES
Discharge: HOME | End: 2018-11-16
Payer: OTHER GOVERNMENT

## 2018-11-16 PROCEDURE — 97110 THERAPEUTIC EXERCISES: CPT

## 2018-11-16 PROCEDURE — 97140 MANUAL THERAPY 1/> REGIONS: CPT

## 2018-11-16 ASSESSMENT — PAIN DESCRIPTION - DESCRIPTORS: DESCRIPTORS: TIGHTNESS

## 2018-11-16 ASSESSMENT — PAIN DESCRIPTION - LOCATION: LOCATION: SHOULDER

## 2018-11-16 ASSESSMENT — PAIN DESCRIPTION - PAIN TYPE: TYPE: CHRONIC PAIN

## 2018-11-16 ASSESSMENT — PAIN SCALES - GENERAL: PAINLEVEL_OUTOF10: 3

## 2018-11-16 ASSESSMENT — PAIN DESCRIPTION - ORIENTATION: ORIENTATION: LEFT

## 2018-11-19 ENCOUNTER — HOSPITAL ENCOUNTER (OUTPATIENT)
Dept: PHYSICAL THERAPY | Age: 53
Setting detail: THERAPIES SERIES
Discharge: HOME | End: 2018-11-19
Payer: OTHER GOVERNMENT

## 2018-11-19 PROCEDURE — 97110 THERAPEUTIC EXERCISES: CPT

## 2018-11-19 PROCEDURE — 97140 MANUAL THERAPY 1/> REGIONS: CPT

## 2018-11-19 ASSESSMENT — PAIN DESCRIPTION - DESCRIPTORS: DESCRIPTORS: TIGHTNESS

## 2018-11-19 ASSESSMENT — PAIN DESCRIPTION - PAIN TYPE: TYPE: CHRONIC PAIN

## 2018-11-19 ASSESSMENT — PAIN DESCRIPTION - LOCATION: LOCATION: SHOULDER

## 2018-11-19 ASSESSMENT — PAIN DESCRIPTION - ORIENTATION: ORIENTATION: LEFT

## 2018-11-19 ASSESSMENT — PAIN SCALES - GENERAL: PAINLEVEL_OUTOF10: 5

## 2018-11-27 ENCOUNTER — HOSPITAL ENCOUNTER (OUTPATIENT)
Dept: PHYSICAL THERAPY | Age: 53
Setting detail: THERAPIES SERIES
Discharge: HOME | End: 2018-11-27
Payer: OTHER GOVERNMENT

## 2018-11-27 PROCEDURE — 97140 MANUAL THERAPY 1/> REGIONS: CPT

## 2018-11-27 PROCEDURE — 97110 THERAPEUTIC EXERCISES: CPT

## 2018-11-27 ASSESSMENT — PAIN DESCRIPTION - LOCATION: LOCATION: SHOULDER

## 2018-11-27 ASSESSMENT — PAIN SCALES - GENERAL: PAINLEVEL_OUTOF10: 6

## 2018-11-27 ASSESSMENT — PAIN DESCRIPTION - ORIENTATION: ORIENTATION: LEFT

## 2018-11-27 ASSESSMENT — PAIN DESCRIPTION - DESCRIPTORS: DESCRIPTORS: PRESSURE;POUNDING

## 2018-11-27 ASSESSMENT — PAIN DESCRIPTION - PAIN TYPE: TYPE: CHRONIC PAIN

## 2018-11-27 NOTE — PROGRESS NOTES
Diagnosis: ADHESIVE CAPSULITIS LEFT SHOULDER, MUSCLE STRAIN LE SHOULDER  Prognosis: Fair  Activity Tolerance: Patient Tolerated treatment well    Plan:  Plan: Continue with current plan    Therapy Time:  Time In: 1420  Time Out: 1520  Minutes: 60  Timed Code Treatment Minutes: 45 Minutes    Treatment Charges: Minutes Units   []  Ultrasound     []  Electrical-Stim     []  Iontophoresis     []  Traction     []  Massage       []  Eval     []  Gait     []  Vasopneumatic Device     [x]  Ther Exercise 30 2   [x]  Manual Therapy 15 1   []  Ther Activities       []  Aquatics     []  Neuro Re-Ed       []  Other       Total Treatment Time: 39 3       Jenn Acosta, LARON

## 2018-11-29 ENCOUNTER — HOSPITAL ENCOUNTER (OUTPATIENT)
Dept: PHYSICAL THERAPY | Age: 53
Setting detail: THERAPIES SERIES
Discharge: HOME | End: 2018-11-29
Payer: OTHER GOVERNMENT

## 2018-11-29 PROCEDURE — 97140 MANUAL THERAPY 1/> REGIONS: CPT

## 2018-11-29 PROCEDURE — 97110 THERAPEUTIC EXERCISES: CPT

## 2018-11-29 ASSESSMENT — PAIN DESCRIPTION - PAIN TYPE: TYPE: CHRONIC PAIN

## 2018-11-29 ASSESSMENT — PAIN DESCRIPTION - LOCATION: LOCATION: SHOULDER

## 2018-11-29 ASSESSMENT — PAIN DESCRIPTION - FREQUENCY: FREQUENCY: INTERMITTENT

## 2018-11-29 ASSESSMENT — PAIN DESCRIPTION - PROGRESSION: CLINICAL_PROGRESSION: NOT CHANGED

## 2018-11-29 ASSESSMENT — PAIN DESCRIPTION - ORIENTATION: ORIENTATION: LEFT

## 2018-11-29 ASSESSMENT — PAIN SCALES - GENERAL: PAINLEVEL_OUTOF10: 6

## 2018-11-29 ASSESSMENT — PAIN DESCRIPTION - ONSET: ONSET: ON-GOING

## 2018-11-30 ENCOUNTER — HOSPITAL ENCOUNTER (OUTPATIENT)
Dept: PHYSICAL THERAPY | Age: 53
Setting detail: THERAPIES SERIES
Discharge: HOME | End: 2018-11-30
Payer: OTHER GOVERNMENT

## 2018-11-30 PROCEDURE — 97110 THERAPEUTIC EXERCISES: CPT

## 2018-11-30 PROCEDURE — 97140 MANUAL THERAPY 1/> REGIONS: CPT

## 2018-11-30 ASSESSMENT — PAIN SCALES - GENERAL: PAINLEVEL_OUTOF10: 6

## 2018-11-30 ASSESSMENT — PAIN DESCRIPTION - LOCATION: LOCATION: SHOULDER

## 2018-11-30 ASSESSMENT — PAIN DESCRIPTION - PAIN TYPE: TYPE: CHRONIC PAIN

## 2018-11-30 ASSESSMENT — PAIN DESCRIPTION - FREQUENCY: FREQUENCY: CONTINUOUS

## 2018-11-30 ASSESSMENT — PAIN DESCRIPTION - PROGRESSION: CLINICAL_PROGRESSION: NOT CHANGED

## 2018-11-30 ASSESSMENT — PAIN DESCRIPTION - DESCRIPTORS: DESCRIPTORS: ACHING;SHARP;NAGGING

## 2018-11-30 ASSESSMENT — PAIN DESCRIPTION - ORIENTATION: ORIENTATION: LEFT

## 2018-11-30 NOTE — PROGRESS NOTES
[address]   Outpatient Physical Therapy  [address] [facility]. [address]  Phone: [phone]  Fax: [phone]  Daily Progress Note    Date: 18    Patient Name: Javier Moyer        MRN: [id number]  Account: [de-identified] : 1965      General Information:  Additional Pertinent Hx: HX OF A-FIB  Referring Practitioner: Purvi Bermudez  Referral Date : 10/26/18  Diagnosis: ADHESIVE CAPSULITIS OF LEFT SHOULDER, STRAIN OF MUSCLE LEFT SHOULDER  Other (Comment): ICD-10: M75.02, S49.912  Onset Date: 17  PT Insurance Information: [place] [employer]  Total # of Visits Approved: 10  Total # of Visits to Date: 8  Plan of Care/Certification Expiration Date: 18  Progress Note Counter: 8/10 VISITS    Subjective:  Subjective: No new c/o. states he is waiting for further approval of next set of therapy visits. Pain:  Patient Currently in Pain: Yes  Pain Assessment: 0-10  Pain Level: 6  Pain Type: Chronic pain  Pain Location: Shoulder  Pain Orientation: Left  Pain Descriptors: Aching; Sharp;Nagging  Pain Frequency: Continuous  Clinical Progression: Not changed  Response to Pain Intervention: None       Objective:  Exercise 3: UBE l1 2.5/2.5  Exercise 4: BATCA: ROWS 25# 10x2, PULL DOWN 25#, TRI PRESS 35# 10x2  Exercise 5: SH FLYS: FLEXION/EXTENSION AND SCAPTION 2# x10  Exercise 6: BICEP CURLS 5# 10x2  Exercise 8: JEFF 3 DIRECTIONS 2# X10  Exercise 9: SHAPE TRACING SUPINE: X's AND O's 2# X10 EA  Exercise 10: AAROM -SCAPTION  Exercise 11: MOBS L SHOULDER SUPINE -DISTRACTION, INFERIOR GLIED, POSTERIOR GLIDE  Exercise 12: S/L ER 2# 10X2  Exercise 19: CP TO LEFT SHOULDER IN SITTING X15' AFTER THER EX  PROM: Left shoulder   Cryotherapy (Minutes\Location): 15 min sitting with CP left shoulder     Assessment: Body structures, Functions, Activity limitations: Decreased strength;Decreased ROM; Decreased functional mobility   Treatment Diagnosis: ADHESIVE CAPSULITIS LEFT SHOULDER, MUSCLE STRAIN LE

## 2018-12-26 DIAGNOSIS — I10 ESSENTIAL HYPERTENSION: ICD-10-CM

## 2018-12-26 RX ORDER — ALLOPURINOL 100 MG/1
TABLET ORAL
Qty: 90 TABLET | Refills: 1 | Status: SHIPPED | OUTPATIENT
Start: 2018-12-26 | End: 2019-03-23 | Stop reason: SDUPTHER

## 2018-12-26 RX ORDER — ATENOLOL 50 MG/1
TABLET ORAL
Qty: 90 TABLET | Refills: 1 | Status: SHIPPED | OUTPATIENT
Start: 2018-12-26 | End: 2019-03-23 | Stop reason: SDUPTHER

## 2018-12-26 NOTE — TELEPHONE ENCOUNTER
From: Rachelle Fiore  Sent: 12/24/2018 7:05 PM EST  Subject: Medication Renewal Request    Adria Billing SEGUNDO Roth would like a refill of the following medications:     atenolol (TENORMIN) 50 MG tablet [Abhi Monique MD]     allopurinol (ZYLOPRIM) 100 MG tablet Rosezetta Pouch Chandana Monique MD]    Preferred pharmacy: [address] [phone] -  [phone]

## 2019-03-04 ENCOUNTER — HOSPITAL ENCOUNTER (OUTPATIENT)
Age: 54
Discharge: HOME | End: 2019-03-04
Payer: OTHER GOVERNMENT

## 2019-03-04 LAB
ALBUMIN SERPL-MCNC: 4.2 G/DL (ref 3.5–5.2)
ALBUMIN/GLOBULIN RATIO: ABNORMAL (ref 1–2.5)
ALP BLD-CCNC: 101 U/L (ref 40–129)
ALT SERPL-CCNC: 42 U/L (ref 5–41)
ANION GAP SERPL CALCULATED.3IONS-SCNC: 10 MMOL/L (ref 9–17)
AST SERPL-CCNC: 29 U/L
BILIRUB SERPL-MCNC: 0.64 MG/DL (ref 0.3–1.2)
BUN BLDV-MCNC: 14 MG/DL (ref 6–20)
BUN/CREAT BLD: ABNORMAL (ref 9–20)
CALCIUM SERPL-MCNC: 9.7 MG/DL (ref 8.6–10.4)
CHLORIDE BLD-SCNC: 101 MMOL/L (ref 98–107)
CO2: 28 MMOL/L (ref 20–31)
CREAT SERPL-MCNC: 1.16 MG/DL (ref 0.7–1.2)
GFR AFRICAN AMERICAN: >60 ML/MIN
GFR NON-AFRICAN AMERICAN: >60 ML/MIN
GFR SERPL CREATININE-BSD FRML MDRD: ABNORMAL ML/MIN/{1.73_M2}
GFR SERPL CREATININE-BSD FRML MDRD: ABNORMAL ML/MIN/{1.73_M2}
GLUCOSE BLD-MCNC: 108 MG/DL (ref 70–99)
POTASSIUM SERPL-SCNC: 3.9 MMOL/L (ref 3.7–5.3)
SODIUM BLD-SCNC: 139 MMOL/L (ref 135–144)
TOTAL PROTEIN: 8 G/DL (ref 6.4–8.3)

## 2019-03-04 PROCEDURE — 36415 COLL VENOUS BLD VENIPUNCTURE: CPT

## 2019-03-04 PROCEDURE — 80053 COMPREHEN METABOLIC PANEL: CPT

## 2019-03-23 DIAGNOSIS — I10 ESSENTIAL HYPERTENSION: ICD-10-CM

## 2019-03-26 RX ORDER — ALLOPURINOL 100 MG/1
TABLET ORAL
Qty: 90 TABLET | Refills: 1 | Status: SHIPPED | OUTPATIENT
Start: 2019-03-26 | End: 2019-06-20

## 2019-03-26 RX ORDER — ATENOLOL 50 MG/1
TABLET ORAL
Qty: 90 TABLET | Refills: 1 | Status: SHIPPED | OUTPATIENT
Start: 2019-03-26 | End: 2019-06-20

## 2019-05-16 ENCOUNTER — OFFICE VISIT (OUTPATIENT)
Dept: ORTHOPEDIC SURGERY | Age: 54
End: 2019-05-16
Payer: OTHER GOVERNMENT

## 2019-05-16 DIAGNOSIS — M25.512 CHRONIC LEFT SHOULDER PAIN: ICD-10-CM

## 2019-05-16 DIAGNOSIS — G89.29 CHRONIC LEFT SHOULDER PAIN: ICD-10-CM

## 2019-05-16 DIAGNOSIS — M77.8 TENDINITIS OF SHOULDER, LEFT: Primary | ICD-10-CM

## 2019-05-16 PROCEDURE — 20610 DRAIN/INJ JOINT/BURSA W/O US: CPT | Performed by: ORTHOPAEDIC SURGERY

## 2019-05-16 PROCEDURE — 99203 OFFICE O/P NEW LOW 30 MIN: CPT | Performed by: ORTHOPAEDIC SURGERY

## 2019-05-16 RX ORDER — LIDOCAINE HYDROCHLORIDE 10 MG/ML
5 INJECTION, SOLUTION INFILTRATION; PERINEURAL ONCE
Status: COMPLETED | OUTPATIENT
Start: 2019-05-16 | End: 2019-05-16

## 2019-05-16 RX ORDER — TRIAMCINOLONE ACETONIDE 40 MG/ML
40 INJECTION, SUSPENSION INTRA-ARTICULAR; INTRAMUSCULAR ONCE
Status: COMPLETED | OUTPATIENT
Start: 2019-05-16 | End: 2019-05-16

## 2019-05-16 RX ADMIN — LIDOCAINE HYDROCHLORIDE 5 ML: 10 INJECTION, SOLUTION INFILTRATION; PERINEURAL at 13:46

## 2019-05-16 RX ADMIN — TRIAMCINOLONE ACETONIDE 40 MG: 40 INJECTION, SUSPENSION INTRA-ARTICULAR; INTRAMUSCULAR at 13:47

## 2019-05-16 NOTE — PROGRESS NOTES
Orthopedic Shoulder Encounter Note     Chief complaint: Left Shoulder pain    HPI: Marky Faye is a 48 y.o.  right-hand dominant male who presents for evaluation of his left shoulder. He has a comminuted history but essentially states that he works for the [employer] and on September 11, 2017 he hurt his left shoulder and elbow picking up a heavy packages left arm in an abducted position. He was initially treated by Dr. Pamela Chacon, who eventually took him back for left elbow surgery in January 2018. The. To be some extensor tendon injury. It's not clear exactly what was done. In any event he developed atrial fibrillation and surgery and needed cardioversion. He has since had that taken care of but he has persistent pain in the shoulder. At some point he was diagnosed with a frozen shoulder and went to physical therapy and got somewhat better but still had some pain with movement. At this time his pain persists. His pain is primarily localized to the posterior and anterior aspect of the shoulder. He describes it as a nagging/throbbing pain and states that his shoulder \"hangs up\" with movement and this is associated with shooting pains. He reports having weakness and intermittent stiffness. He denies having any pain at nighttime. Previous treatment:    NSAIDs: None    Physical Therapy:  Yes    Injections: He states that he has had 2 cortisone injections to the left shoulder the last of which came in the fall of 2018    Surgeries: Left elbow surgery but otherwise specified; right distal biceps tendon repair    Review of Systems:     Constitution: no fever or chills   Pain level: 6/10  Musculoskeletal: As noted in the HPI   Neurologic: no neurologic symptoms    Past Medical History  Sulema Haines  has a past medical history of H/O upper respiratory infection, Hyperlipidemia, Kidney stone, and Kidney stones.     Past Surgical History  Sulema Haines  has a past surgical history that includes Arm Surgery (Right); Kidney surgery (Right); Vasectomy; Tonsillectomy; and Arm Surgery (Left, 01/26/2018). Current Medications  Current Outpatient Medications   Medication Sig Dispense Refill    atenolol (TENORMIN) 50 MG tablet TAKE ONE TABLET BY MOUTH DAILY 90 tablet 1    allopurinol (ZYLOPRIM) 100 MG tablet TAKE ONE TABLET BY MOUTH DAILY 90 tablet 1    apixaban (ELIQUIS) 5 MG TABS tablet Take 5 mg by mouth      atorvastatin (LIPITOR) 20 MG tablet TAKE ONE TABLET BY MOUTH DAILY      DULoxetine (CYMBALTA) 30 MG extended release capsule Take 1 capsule by mouth daily 30 capsule 5     No current facility-administered medications for this visit. Allergies  Allergies have been reviewed. Gabe Hogan has No Known Allergies. Social History  Gabe Hogan  reports that he has been smoking cigars. He has never used smokeless tobacco. He reports that he drinks alcohol. He reports that he does not use drugs. Family History  Kain's family history includes Cancer in his brother and another family member; Coronary Art Dis in his mother and paternal grandmother; Diabetes in his maternal uncle; Ermalinda Amanda in his maternal grandfather; Stroke in his mother. Physical Exam:     There were no vitals taken for this visit.    General Appearance: alert, well appearing, and in no distress  Mental Status: alert, oriented to person, place, and time  Gait: normal    Shoulder:    Skin: warm and dry, no rash or erythema; no swelling or obvious muscular atrophy  Vasculature: 2+ radial pulses bilaterally  Neuro: Sensation grossly intact to light touch diffusely  Tenderness: Non-tender to palpation    ROM: (Degrees)    Right   A P   Left   A P    Elevation  140    Elevation  125 155  Abduction  165    Abduction  70  165  ER   85    ER   55 55  IR   L4    IR   L4   90 abd/ER      90 abd/ER     90 abd/IR      90 abd/IR     Crepitation  No    Crepitation No  Dyskenesia  No    Dyskenesia No      Muscle strength:    Right       Left    Deltoid   5    Deltoid   5  Supraspinatus  5    Supraspinatus  4+  ER   5    ER   5  IR   5    IR   5    Special tests    Right   Rotator Cuff    Left    n   Painful arc    y   n   Pain with ER    n    n   Neer's     y    n   Hawkin's    y    n   Drop Arm    n  n   Lift off/Belly Press   n  n   ER Lag    n          [facility]  n   AC tenderness   n  n   Cross-chest adduction  n       Labrum/biceps    n   Tererro's    y (equivocal)   n   Biceps sheer    n      n   Speed's/Yergason's   n    n   Tenderness Biceps Groove  n    n   Cecilio's    n         Instability  n   Ant Apprehension   n    n   Post Apprehension   n    n   Ant Load shift    n    n   Post Load shift   n   n   Sulcus     n  n   Generalized Laxity   n  n   Relocation test   n  n   Crank test     n  n   Brent-superior escape  n       Imaging:  Xrays: 4 views of the left shoulder obtained on 5/16/19 were independently reviewed  Indications: Left shoulder pain  Findings:  Normal glenohumeral and acromioclavicular joint spaces with a type 2 acromion. Impression: Normal left shoulder radiograph    MRI: MRI of the left shoulder reviewed from 3/6/19 at an outside facility are of poor quality but demonstrate some increased signal within the supraspinatus tendon on T2-weighted images suggestive of either tendinitis or an intrasubstance tear. Labral tissue appears to be intact. Biceps also appears to be intact. Again quality of the image limits ability to adequately assess any of the structures. Impression/Plan:     Jack Spencer is a 48 y.o. old male with chronic left shoulder pain. Really unclear as to the primary cause of his pain but I had a discussion with the patient with regards to potential treatment options. At this time I'd recommend a cortisone injection into the subacromial space and physical therapy focusing on rotator cuff and scapular stabilizer strengthening.   The injection was administered as outlined below. We will work with [employer]. to get physical therapy approved. I'll see him back for reevaluation in 3 months but he was encouraged to return or call earlier with questions and/or concerns. We discussed the possibility of a shoulder scope procedure if he has persistent pain at that time. He indicates is going to be following up with his workman comp doctor for recommendations regarding return to work. From my standpoint at this time I would recommend against repetitive at or above shoulder activity.         NA = Not assessed  RTC = Rotator cuff  RCT = Rotator cuff tear  ER = External rotation  IR = Internal rotation  AC = Acromioclavicular  GH = Glenohumeral  n = No  y = Yes

## 2019-06-08 ENCOUNTER — HOSPITAL ENCOUNTER (EMERGENCY)
Age: 54
Discharge: HOME | End: 2019-06-09
Attending: EMERGENCY MEDICINE
Payer: COMMERCIAL

## 2019-06-08 VITALS
HEIGHT: 78 IN | OXYGEN SATURATION: 94 % | SYSTOLIC BLOOD PRESSURE: 157 MMHG | TEMPERATURE: 97.8 F | RESPIRATION RATE: 18 BRPM | DIASTOLIC BLOOD PRESSURE: 84 MMHG | WEIGHT: 315 LBS | BODY MASS INDEX: 36.45 KG/M2 | HEART RATE: 72 BPM

## 2019-06-08 DIAGNOSIS — S61.219A LACERATION OF FINGER OF LEFT HAND WITHOUT FOREIGN BODY WITHOUT DAMAGE TO NAIL, UNSPECIFIED FINGER, INITIAL ENCOUNTER: Primary | ICD-10-CM

## 2019-06-08 PROCEDURE — 99282 EMERGENCY DEPT VISIT SF MDM: CPT

## 2019-06-08 PROCEDURE — 12001 RPR S/N/AX/GEN/TRNK 2.5CM/<: CPT

## 2019-06-08 RX ORDER — LIDOCAINE HYDROCHLORIDE 10 MG/ML
5 INJECTION, SOLUTION INFILTRATION; PERINEURAL ONCE
Status: COMPLETED | OUTPATIENT
Start: 2019-06-09 | End: 2019-06-09

## 2019-06-08 ASSESSMENT — PAIN SCALES - GENERAL: PAINLEVEL_OUTOF10: 3

## 2019-06-08 ASSESSMENT — PAIN DESCRIPTION - FREQUENCY: FREQUENCY: CONTINUOUS

## 2019-06-08 ASSESSMENT — PAIN DESCRIPTION - DESCRIPTORS: DESCRIPTORS: ACHING

## 2019-06-08 ASSESSMENT — PAIN DESCRIPTION - ORIENTATION: ORIENTATION: LEFT

## 2019-06-08 ASSESSMENT — PAIN DESCRIPTION - LOCATION: LOCATION: HAND

## 2019-06-09 PROCEDURE — 6370000000 HC RX 637 (ALT 250 FOR IP): Performed by: EMERGENCY MEDICINE

## 2019-06-09 PROCEDURE — 2500000003 HC RX 250 WO HCPCS: Performed by: EMERGENCY MEDICINE

## 2019-06-09 RX ORDER — CEPHALEXIN 250 MG/1
500 CAPSULE ORAL ONCE
Status: COMPLETED | OUTPATIENT
Start: 2019-06-09 | End: 2019-06-09

## 2019-06-09 RX ORDER — CEPHALEXIN 500 MG/1
500 CAPSULE ORAL 4 TIMES DAILY
Qty: 28 CAPSULE | Refills: 0 | Status: SHIPPED | OUTPATIENT
Start: 2019-06-09 | End: 2019-06-16

## 2019-06-09 RX ADMIN — CEPHALEXIN 500 MG: 250 CAPSULE ORAL at 00:22

## 2019-06-09 RX ADMIN — LIDOCAINE HYDROCHLORIDE 5 ML: 10 INJECTION, SOLUTION INFILTRATION; PERINEURAL at 00:11

## 2019-06-09 ASSESSMENT — ENCOUNTER SYMPTOMS
COUGH: 0
BACK PAIN: 0
EYES NEGATIVE: 1
ABDOMINAL PAIN: 0
SHORTNESS OF BREATH: 0
GASTROINTESTINAL NEGATIVE: 1
RESPIRATORY NEGATIVE: 1

## 2019-06-09 ASSESSMENT — PAIN SCALES - GENERAL: PAINLEVEL_OUTOF10: 4

## 2019-06-09 NOTE — ED NOTES

## 2019-06-09 NOTE — ED PROVIDER NOTES
EMERGENCY DEPARTMENT ENCOUNTER    Pt Name: Luna Lord  MRN: [id number]  [place] 1965  Date of evaluation: 6/9/19  CHIEF COMPLAINT       Chief Complaint   Patient presents with    Laceration     HISTORY OF PRESENT ILLNESS   The pt presents for evaluation of laceration. Left index and middle fingers, cut by chainsaw. The history is provided by the patient. Laceration   Location:  Hand  Hand laceration location:  L fingers  Depth:  Cutaneous  Quality: straight    Bleeding: controlled    Laceration mechanism:  Metal edge  Pain details:     Quality:  Aching    Progression:  Unchanged  Foreign body present:  Metal  Relieved by:  Nothing  Worsened by: Movement  Tetanus status:  Up to date  Associated symptoms: no fever        REVIEW OF SYSTEMS     Review of Systems   Constitutional: Negative. Negative for chills and fever. HENT: Negative. Negative for congestion. Eyes: Negative. Respiratory: Negative. Negative for cough and shortness of breath. Cardiovascular: Negative. Negative for chest pain. Gastrointestinal: Negative. Negative for abdominal pain. Genitourinary: Negative. Musculoskeletal: Negative. Negative for back pain. Skin: Positive for wound. Neurological: Negative. Negative for headaches. All other systems reviewed and are negative.     PASTMEDICAL HISTORY     Past Medical History:   Diagnosis Date    H/O upper respiratory infection     Hyperlipidemia     Kidney stone     Kidney stones      SURGICAL HISTORY       Past Surgical History:   Procedure Laterality Date    ARM SURGERY Right     ARM SURGERY Left 01/26/2018    extensor tendon repair    KIDNEY SURGERY Right     TONSILLECTOMY      VASECTOMY       CURRENT MEDICATIONS       Previous Medications    ALLOPURINOL (ZYLOPRIM) 100 MG TABLET    TAKE ONE TABLET BY MOUTH DAILY    APIXABAN (ELIQUIS) 5 MG TABS TABLET    Take 5 mg by mouth    ATENOLOL (TENORMIN) 50 MG TABLET    TAKE ONE TABLET BY MOUTH DAILY ATORVASTATIN (LIPITOR) 20 MG TABLET    TAKE ONE TABLET BY MOUTH DAILY    DULOXETINE (CYMBALTA) 30 MG EXTENDED RELEASE CAPSULE    Take 1 capsule by mouth daily     ALLERGIES     has No Known Allergies. FAMILY HISTORY     indicated that the status of his mother is unknown. He indicated that the status of his brother is unknown. He indicated that the status of his maternal grandfather is unknown. He indicated that the status of his paternal grandmother is unknown. He indicated that the status of his maternal uncle is unknown. He indicated that the status of his other is unknown. SOCIAL HISTORY       Social History     Tobacco Use    Smoking status: Current Some Day Smoker     Types: Cigars    Smokeless tobacco: Never Used   Substance Use Topics    Alcohol use: Yes     Comment: occ    Drug use: No     PHYSICAL EXAM     INITIAL VITALS: BP (!) 157/84   Pulse 72   Temp 97.8 °F (36.6 °C) (Oral)   Resp 18   Ht 6' 7\" (2.007 m)   Wt (!) 355 lb (161 kg)   SpO2 94%   BMI 39.99 kg/m²    Physical Exam   Constitutional: He is oriented to person, place, and time. No distress. HENT:   Head: Normocephalic and atraumatic. Eyes: Conjunctivae are normal.   Musculoskeletal:   x2 2cm laceration to prox index and middle finger on left   Neurological: He is alert and oriented to person, place, and time. Skin: Skin is warm and dry. No rash noted. He is not diaphoretic. Nursing note and vitals reviewed. MEDICAL DECISION MAKING:     Laceration repaired as described. Otherwise, no evidence of emergent pathology. Discussed results and plan with the pt. They expressed appropriate understanding. Pt given close follow up, supportive care instructions and strict return instructions at the bedside.          CRITICAL CARE:       PROCEDURES:    Lac Repair  Date/Time: 6/9/2019 12:27 AM  Performed by: Gilbert Snyder MD  Authorized by: Gilbert Snyder MD     Consent:     Consent obtained:  Verbal    Consent given by: given the following medications while in the emergency department:  Orders Placed This Encounter   Medications    lidocaine 1 % injection 5 mL    Tetanus-Diphth-Acell Pertussis (BOOSTRIX) injection 0.5 mL    cephALEXin (KEFLEX) capsule 500 mg    cephALEXin (KEFLEX) 500 MG capsule     Sig: Take 1 capsule by mouth 4 times daily for 7 days     Dispense:  28 capsule     Refill:  0     CONSULTS:  None    FINAL IMPRESSION      1. Laceration of finger of left hand without foreign body without damage to nail, unspecified finger, initial encounter          DISPOSITION/PLAN   DISPOSITION Decision To Discharge 06/09/2019 12:19:45 AM      PATIENT REFERRED TO:  Corwin Poole.   [address] [place] [address]  [phone]    Call in 1 day      DISCHARGE MEDICATIONS:  New Prescriptions    CEPHALEXIN (KEFLEX) 500 MG CAPSULE    Take 1 capsule by mouth 4 times daily for 7 days     Araceli Smith MD  Attending Emergency Physician                    Socorro Michael MD  06/09/19 2003

## 2019-06-20 ENCOUNTER — OFFICE VISIT (OUTPATIENT)
Dept: PRIMARY CARE CLINIC | Age: 54
End: 2019-06-20
Payer: COMMERCIAL

## 2019-06-20 VITALS
BODY MASS INDEX: 36.45 KG/M2 | SYSTOLIC BLOOD PRESSURE: 132 MMHG | OXYGEN SATURATION: 96 % | WEIGHT: 315 LBS | HEIGHT: 78 IN | HEART RATE: 72 BPM | DIASTOLIC BLOOD PRESSURE: 88 MMHG

## 2019-06-20 DIAGNOSIS — E78.00 PURE HYPERCHOLESTEROLEMIA: ICD-10-CM

## 2019-06-20 DIAGNOSIS — Z79.899 MEDICATION MANAGEMENT: ICD-10-CM

## 2019-06-20 DIAGNOSIS — S61.412A LACERATION OF LEFT HAND WITHOUT FOREIGN BODY, INITIAL ENCOUNTER: ICD-10-CM

## 2019-06-20 DIAGNOSIS — Z12.5 SCREENING PSA (PROSTATE SPECIFIC ANTIGEN): ICD-10-CM

## 2019-06-20 DIAGNOSIS — N20.0 NEPHROLITHIASIS: ICD-10-CM

## 2019-06-20 DIAGNOSIS — I10 ESSENTIAL HYPERTENSION: Primary | ICD-10-CM

## 2019-06-20 PROCEDURE — 99213 OFFICE O/P EST LOW 20 MIN: CPT | Performed by: FAMILY MEDICINE

## 2019-06-20 RX ORDER — ALLOPURINOL 100 MG/1
TABLET ORAL
Qty: 90 TABLET | Refills: 3 | Status: SHIPPED | OUTPATIENT
Start: 2019-06-20 | End: 2020-06-12

## 2019-06-20 RX ORDER — ATORVASTATIN CALCIUM 20 MG/1
TABLET, FILM COATED ORAL
Qty: 90 TABLET | Refills: 3 | Status: SHIPPED | OUTPATIENT
Start: 2019-06-20 | End: 2020-09-01

## 2019-06-20 RX ORDER — ATENOLOL 50 MG/1
TABLET ORAL
Qty: 90 TABLET | Refills: 3 | Status: SHIPPED | OUTPATIENT
Start: 2019-06-20 | End: 2020-11-12

## 2019-06-20 ASSESSMENT — ENCOUNTER SYMPTOMS
DIARRHEA: 0
WHEEZING: 0
NAUSEA: 0
ABDOMINAL PAIN: 0
SHORTNESS OF BREATH: 0
RHINORRHEA: 0
EYE REDNESS: 0
COUGH: 0
EYE DISCHARGE: 0
SORE THROAT: 0
VOMITING: 0

## 2019-06-20 ASSESSMENT — PATIENT HEALTH QUESTIONNAIRE - PHQ9
2. FEELING DOWN, DEPRESSED OR HOPELESS: 0
SUM OF ALL RESPONSES TO PHQ QUESTIONS 1-9: 0
SUM OF ALL RESPONSES TO PHQ9 QUESTIONS 1 & 2: 0
SUM OF ALL RESPONSES TO PHQ QUESTIONS 1-9: 0
1. LITTLE INTEREST OR PLEASURE IN DOING THINGS: 0

## 2019-06-20 NOTE — PROGRESS NOTES
[address] [facility] PRIMARY CARE  [id number] [facility]  Dept: [phone]    Aiden Herrmann is a 48 y.o. male who presents today for his medical conditions/complaintsas noted below. Chief Complaint   Patient presents with    Hand Injury    Suture / Staple Removal    Other     Discuss labs       HPI:     HPI   Pt needs sutures out placed 10 days ago. No numbness, drainage, signs of infection. Pt states hands have been freezing up, shaking.      LDL Cholesterol (mg/dL)   Date Value   05/16/2018 90   02/03/2018 161 (H)   01/17/2017 170 (H)       (goal LDL is <100)   AST (U/L)   Date Value   03/04/2019 29     ALT (U/L)   Date Value   03/04/2019 42 (H)     BUN (mg/dL)   Date Value   03/04/2019 14     BP Readings from Last 3 Encounters:   06/20/19 132/88   06/08/19 (!) 157/84   08/15/18 130/88          (goal 120/80)    Past Medical History:   Diagnosis Date    H/O upper respiratory infection     Hyperlipidemia     Kidney stone     Kidney stones       Past Surgical History:   Procedure Laterality Date    ARM SURGERY Right     ARM SURGERY Left 01/26/2018    extensor tendon repair    KIDNEY SURGERY Right     TONSILLECTOMY      VASECTOMY         Family History   Problem Relation Age of Onset    Coronary Art Dis Mother     Stroke Mother     Cancer Brother         malignant melanoma of skin    Diabetes Maternal Uncle     Lung Cancer Maternal Grandfather     Coronary Art Dis Paternal Grandmother     Cancer Other         breast       Social History     Tobacco Use    Smoking status: Current Some Day Smoker     Types: Cigars    Smokeless tobacco: Never Used   Substance Use Topics    Alcohol use: Yes     Comment: occ      Current Outpatient Medications   Medication Sig Dispense Refill    atenolol (TENORMIN) 50 MG tablet TAKE ONE TABLET BY MOUTH DAILY 90 tablet 3    allopurinol (ZYLOPRIM) 100 MG tablet TAKE ONE TABLET BY MOUTH DAILY 90 tablet 3    atorvastatin (LIPITOR) 20 MG tablet TAKE ONE TABLET BY MOUTH DAILY 90 tablet 3    apixaban (ELIQUIS) 5 MG TABS tablet Take 5 mg by mouth       No current facility-administered medications for this visit. No Known Allergies    Health Maintenance   Topic Date Due    HIV screen  11/20/1980    Diabetes screen  11/20/2005    Shingles Vaccine (1 of 2) 11/20/2015    Flu vaccine (Season Ended) 09/01/2019    Potassium monitoring  03/04/2020    Creatinine monitoring  03/04/2020    Lipid screen  05/16/2023    Colon cancer screen colonoscopy  01/15/2026    DTaP/Tdap/Td vaccine (3 - Td) 08/15/2028    Pneumococcal 0-64 years Vaccine  Completed    Hepatitis C screen  Completed       Subjective:      Review of Systems   Constitutional: Negative for chills and fever. HENT: Negative for rhinorrhea and sore throat. Eyes: Negative for discharge and redness. Respiratory: Negative for cough, shortness of breath and wheezing. Cardiovascular: Negative for chest pain and palpitations. Gastrointestinal: Negative for abdominal pain, diarrhea, nausea and vomiting. Genitourinary: Negative for dysuria and frequency. Musculoskeletal: Negative for arthralgias and myalgias. Neurological: Negative for dizziness, light-headedness and headaches. Psychiatric/Behavioral: Negative for sleep disturbance. Objective:     /88   Pulse 72   Ht 6' 6.96\" (2.006 m)   Wt (!) 354 lb 6.4 oz (160.8 kg)   SpO2 96%   BMI 39.97 kg/m²   Physical Exam   Constitutional: He is oriented to person, place, and time. He appears well-developed and well-nourished. No distress. HENT:   Head: Normocephalic and atraumatic. Mouth/Throat: Oropharynx is clear and moist.   Eyes: Pupils are equal, round, and reactive to light. Conjunctivae are normal. Right eye exhibits no discharge. Left eye exhibits no discharge. No scleral icterus. Neck: No tracheal deviation present. No thyromegaly present.    Cardiovascular: Normal rate, regular rhythm and normal heart sounds. No carotid bruits   Pulmonary/Chest: Effort normal and breath sounds normal. No respiratory distress. He has no wheezes. Musculoskeletal: He exhibits no edema. Lymphadenopathy:     He has no cervical adenopathy. Neurological: He is alert and oriented to person, place, and time. Skin: Skin is warm. No rash noted. Sutures intack on index and middle finger left hand. Psychiatric: He has a normal mood and affect. His behavior is normal. Thought content normal.   Nursing note and vitals reviewed. Assessment:       Diagnosis Orders   1. Essential hypertension  Basic Metabolic Panel, Fasting    atenolol (TENORMIN) 50 MG tablet   2. Screening PSA (prostate specific antigen)  PSA Screening   3. Pure hypercholesterolemia  Lipid, Fasting    CK   4. Medication management  CBC Auto Differential   5. Laceration of left hand without foreign body, initial encounter     6. Nephrolithiasis  Uric Acid        Plan:    labs ordered  Sutures removed  Renew meds    Return in about 6 months (around 12/20/2019).     Orders Placed This Encounter   Procedures    Basic Metabolic Panel, Fasting     Standing Status:   Future     Standing Expiration Date:   6/20/2020    Lipid, Fasting     Standing Status:   Future     Standing Expiration Date:   6/20/2020    CK     Standing Status:   Future     Standing Expiration Date:   6/20/2020    PSA Screening     Standing Status:   Future     Standing Expiration Date:   6/20/2020    CBC Auto Differential     Standing Status:   Future     Standing Expiration Date:   6/20/2020    Uric Acid     Standing Status:   Future     Standing Expiration Date:   6/20/2020     Orders Placed This Encounter   Medications    atenolol (TENORMIN) 50 MG tablet     Sig: TAKE ONE TABLET BY MOUTH DAILY     Dispense:  90 tablet     Refill:  3    allopurinol (ZYLOPRIM) 100 MG tablet     Sig: TAKE ONE TABLET BY MOUTH DAILY     Dispense:  90 tablet     Refill:  3    atorvastatin (LIPITOR) 20 MG tablet     Sig: TAKE ONE TABLET BY MOUTH DAILY     Dispense:  90 tablet     Refill:  3       Patient given educationalmaterials - see patient instructions. Discussed use, benefit, and side effectsof prescribed medications. All patient questions answered. Pt voiced understanding. Reviewed health maintenance. Instructed to continue current medications, diet andexercise. Patient agreed with treatment plan. Follow up as directed.      Electronicallysigned by Dominik Junior MD on 6/20/2019 at 10:49 AM

## 2019-06-25 ENCOUNTER — HOSPITAL ENCOUNTER (OUTPATIENT)
Age: 54
Discharge: HOME | End: 2019-06-25
Payer: COMMERCIAL

## 2019-06-25 DIAGNOSIS — I10 ESSENTIAL HYPERTENSION: ICD-10-CM

## 2019-06-25 DIAGNOSIS — E78.00 PURE HYPERCHOLESTEROLEMIA: ICD-10-CM

## 2019-06-25 DIAGNOSIS — Z12.5 SCREENING PSA (PROSTATE SPECIFIC ANTIGEN): ICD-10-CM

## 2019-06-25 DIAGNOSIS — Z79.899 MEDICATION MANAGEMENT: ICD-10-CM

## 2019-06-25 DIAGNOSIS — N20.0 NEPHROLITHIASIS: ICD-10-CM

## 2019-06-25 LAB
ABSOLUTE EOS #: 0.1 K/UL (ref 0–0.4)
ABSOLUTE IMMATURE GRANULOCYTE: ABNORMAL K/UL (ref 0–0.3)
ABSOLUTE LYMPH #: 2 K/UL (ref 1–4.8)
ABSOLUTE MONO #: 0.6 K/UL (ref 0.1–1.3)
ANION GAP SERPL CALCULATED.3IONS-SCNC: 12 MMOL/L (ref 9–17)
BASOPHILS # BLD: 0 % (ref 0–2)
BASOPHILS ABSOLUTE: 0 K/UL (ref 0–0.2)
BUN BLDV-MCNC: 11 MG/DL (ref 6–20)
BUN/CREAT BLD: NORMAL (ref 9–20)
CALCIUM SERPL-MCNC: 9.2 MG/DL (ref 8.6–10.4)
CHLORIDE BLD-SCNC: 103 MMOL/L (ref 98–107)
CHOLESTEROL, FASTING: 145 MG/DL
CHOLESTEROL/HDL RATIO: 2.7
CO2: 25 MMOL/L (ref 20–31)
CREAT SERPL-MCNC: 1.02 MG/DL (ref 0.7–1.2)
DIFFERENTIAL TYPE: ABNORMAL
EOSINOPHILS RELATIVE PERCENT: 1 % (ref 0–4)
GFR AFRICAN AMERICAN: >60 ML/MIN
GFR NON-AFRICAN AMERICAN: >60 ML/MIN
GFR SERPL CREATININE-BSD FRML MDRD: NORMAL ML/MIN/{1.73_M2}
GFR SERPL CREATININE-BSD FRML MDRD: NORMAL ML/MIN/{1.73_M2}
GLUCOSE FASTING: 99 MG/DL (ref 70–99)
HCT VFR BLD CALC: 45.7 % (ref 41–53)
HDLC SERPL-MCNC: 53 MG/DL
HEMOGLOBIN: 15.3 G/DL (ref 13.5–17.5)
IMMATURE GRANULOCYTES: ABNORMAL %
LDL CHOLESTEROL: 80 MG/DL (ref 0–130)
LYMPHOCYTES # BLD: 24 % (ref 24–44)
MCH RBC QN AUTO: 29.7 PG (ref 26–34)
MCHC RBC AUTO-ENTMCNC: 33.5 G/DL (ref 31–37)
MCV RBC AUTO: 88.7 FL (ref 80–100)
MONOCYTES # BLD: 7 % (ref 1–7)
NRBC AUTOMATED: ABNORMAL PER 100 WBC
PDW BLD-RTO: 15.6 % (ref 11.5–14.9)
PLATELET # BLD: 227 K/UL (ref 150–450)
PLATELET ESTIMATE: ABNORMAL
PMV BLD AUTO: 7.7 FL (ref 6–12)
POTASSIUM SERPL-SCNC: 3.8 MMOL/L (ref 3.7–5.3)
PROSTATE SPECIFIC ANTIGEN: 1.81 UG/L
RBC # BLD: 5.16 M/UL (ref 4.5–5.9)
RBC # BLD: ABNORMAL 10*6/UL
SEG NEUTROPHILS: 68 % (ref 36–66)
SEGMENTED NEUTROPHILS ABSOLUTE COUNT: 6 K/UL (ref 1.3–9.1)
SODIUM BLD-SCNC: 140 MMOL/L (ref 135–144)
TOTAL CK: 77 U/L (ref 39–308)
TRIGLYCERIDE, FASTING: 58 MG/DL
URIC ACID: 6.1 MG/DL (ref 3.4–7)
VLDLC SERPL CALC-MCNC: NORMAL MG/DL (ref 1–30)
WBC # BLD: 8.7 K/UL (ref 3.5–11)
WBC # BLD: ABNORMAL 10*3/UL

## 2019-06-25 PROCEDURE — 36415 COLL VENOUS BLD VENIPUNCTURE: CPT

## 2019-06-25 PROCEDURE — 82550 ASSAY OF CK (CPK): CPT

## 2019-06-25 PROCEDURE — 80048 BASIC METABOLIC PNL TOTAL CA: CPT

## 2019-06-25 PROCEDURE — 84550 ASSAY OF BLOOD/URIC ACID: CPT

## 2019-06-25 PROCEDURE — 85025 COMPLETE CBC W/AUTO DIFF WBC: CPT

## 2019-06-25 PROCEDURE — 80061 LIPID PANEL: CPT

## 2019-06-25 PROCEDURE — G0103 PSA SCREENING: HCPCS

## 2019-08-16 ENCOUNTER — OFFICE VISIT (OUTPATIENT)
Dept: PODIATRY | Age: 54
End: 2019-08-16
Payer: COMMERCIAL

## 2019-08-16 ENCOUNTER — OFFICE VISIT (OUTPATIENT)
Dept: ORTHOPEDIC SURGERY | Age: 54
End: 2019-08-16
Payer: OTHER GOVERNMENT

## 2019-08-16 VITALS — WEIGHT: 315 LBS | BODY MASS INDEX: 36.45 KG/M2 | HEIGHT: 78 IN

## 2019-08-16 DIAGNOSIS — S90.112A CONTUSION OF LEFT GREAT TOE WITHOUT DAMAGE TO NAIL, INITIAL ENCOUNTER: Primary | ICD-10-CM

## 2019-08-16 DIAGNOSIS — M25.512 CHRONIC LEFT SHOULDER PAIN: Primary | ICD-10-CM

## 2019-08-16 DIAGNOSIS — G89.29 CHRONIC LEFT SHOULDER PAIN: Primary | ICD-10-CM

## 2019-08-16 PROCEDURE — 99213 OFFICE O/P EST LOW 20 MIN: CPT | Performed by: ORTHOPAEDIC SURGERY

## 2019-08-16 PROCEDURE — 99203 OFFICE O/P NEW LOW 30 MIN: CPT | Performed by: PODIATRIST

## 2019-08-16 ASSESSMENT — ENCOUNTER SYMPTOMS
COLOR CHANGE: 0
NAUSEA: 0
SHORTNESS OF BREATH: 0
BACK PAIN: 0
DIARRHEA: 0

## 2019-10-02 ENCOUNTER — TELEPHONE (OUTPATIENT)
Dept: PRIMARY CARE CLINIC | Age: 54
End: 2019-10-02

## 2019-10-04 ENCOUNTER — NURSE ONLY (OUTPATIENT)
Dept: PRIMARY CARE CLINIC | Age: 54
End: 2019-10-04
Payer: COMMERCIAL

## 2019-10-04 DIAGNOSIS — Z23 IMMUNIZATION DUE: Primary | ICD-10-CM

## 2019-10-04 PROCEDURE — 90471 IMMUNIZATION ADMIN: CPT | Performed by: FAMILY MEDICINE

## 2019-10-04 PROCEDURE — 90750 HZV VACC RECOMBINANT IM: CPT | Performed by: FAMILY MEDICINE

## 2019-10-22 ENCOUNTER — INITIAL CONSULT (OUTPATIENT)
Dept: BARIATRICS/WEIGHT MGMT | Age: 54
End: 2019-10-22
Payer: COMMERCIAL

## 2019-10-22 VITALS
HEART RATE: 70 BPM | SYSTOLIC BLOOD PRESSURE: 128 MMHG | BODY MASS INDEX: 36.45 KG/M2 | RESPIRATION RATE: 20 BRPM | DIASTOLIC BLOOD PRESSURE: 86 MMHG | HEIGHT: 78 IN | WEIGHT: 315 LBS

## 2019-10-22 DIAGNOSIS — K42.9 UMBILICAL HERNIA WITHOUT OBSTRUCTION AND WITHOUT GANGRENE: Primary | ICD-10-CM

## 2019-10-22 PROCEDURE — 99204 OFFICE O/P NEW MOD 45 MIN: CPT | Performed by: SURGERY

## 2019-12-05 ENCOUNTER — NURSE ONLY (OUTPATIENT)
Dept: PRIMARY CARE CLINIC | Age: 54
End: 2019-12-05
Payer: COMMERCIAL

## 2019-12-05 ENCOUNTER — TELEPHONE (OUTPATIENT)
Dept: BARIATRICS/WEIGHT MGMT | Age: 54
End: 2019-12-05

## 2019-12-05 DIAGNOSIS — Z23 IMMUNIZATION DUE: Primary | ICD-10-CM

## 2019-12-05 PROCEDURE — 90750 HZV VACC RECOMBINANT IM: CPT | Performed by: FAMILY MEDICINE

## 2019-12-05 PROCEDURE — 90471 IMMUNIZATION ADMIN: CPT | Performed by: FAMILY MEDICINE

## 2019-12-29 ENCOUNTER — ANESTHESIA EVENT (OUTPATIENT)
Dept: OPERATING ROOM | Age: 54
End: 2019-12-29
Payer: COMMERCIAL

## 2019-12-30 ENCOUNTER — HOSPITAL ENCOUNTER (OUTPATIENT)
Age: 54
Setting detail: OUTPATIENT SURGERY
Discharge: HOME | End: 2019-12-30
Attending: SURGERY | Admitting: SURGERY
Payer: COMMERCIAL

## 2019-12-30 ENCOUNTER — ANESTHESIA (OUTPATIENT)
Dept: OPERATING ROOM | Age: 54
End: 2019-12-30
Payer: COMMERCIAL

## 2019-12-30 VITALS
SYSTOLIC BLOOD PRESSURE: 136 MMHG | WEIGHT: 315 LBS | BODY MASS INDEX: 36.45 KG/M2 | OXYGEN SATURATION: 96 % | HEIGHT: 78 IN | TEMPERATURE: 97.3 F | HEART RATE: 77 BPM | RESPIRATION RATE: 18 BRPM | DIASTOLIC BLOOD PRESSURE: 78 MMHG

## 2019-12-30 VITALS — OXYGEN SATURATION: 95 % | TEMPERATURE: 97.2 F | SYSTOLIC BLOOD PRESSURE: 121 MMHG | DIASTOLIC BLOOD PRESSURE: 79 MMHG

## 2019-12-30 DIAGNOSIS — Z87.19 S/P HERNIA REPAIR: Primary | ICD-10-CM

## 2019-12-30 DIAGNOSIS — Z98.890 S/P HERNIA REPAIR: Primary | ICD-10-CM

## 2019-12-30 LAB
ANION GAP SERPL CALCULATED.3IONS-SCNC: 12 MMOL/L (ref 9–17)
BUN BLDV-MCNC: 14 MG/DL (ref 6–20)
BUN/CREAT BLD: ABNORMAL (ref 9–20)
CALCIUM SERPL-MCNC: 9.1 MG/DL (ref 8.6–10.4)
CHLORIDE BLD-SCNC: 105 MMOL/L (ref 98–107)
CO2: 24 MMOL/L (ref 20–31)
CREAT SERPL-MCNC: 0.88 MG/DL (ref 0.7–1.2)
GFR AFRICAN AMERICAN: >60 ML/MIN
GFR NON-AFRICAN AMERICAN: >60 ML/MIN
GFR SERPL CREATININE-BSD FRML MDRD: ABNORMAL ML/MIN/{1.73_M2}
GFR SERPL CREATININE-BSD FRML MDRD: ABNORMAL ML/MIN/{1.73_M2}
GLUCOSE BLD-MCNC: 100 MG/DL (ref 70–99)
INR BLD: 1
POTASSIUM SERPL-SCNC: 4.2 MMOL/L (ref 3.7–5.3)
PROTHROMBIN TIME: 10.3 SEC (ref 9–12)
SODIUM BLD-SCNC: 141 MMOL/L (ref 135–144)

## 2019-12-30 PROCEDURE — 85610 PROTHROMBIN TIME: CPT

## 2019-12-30 PROCEDURE — 7100000011 HC PHASE II RECOVERY - ADDTL 15 MIN: Performed by: SURGERY

## 2019-12-30 PROCEDURE — 49653 PR LAP, VENTRAL HERNIA REPAIR,INCARCERATED: CPT | Performed by: SURGERY

## 2019-12-30 PROCEDURE — 6360000002 HC RX W HCPCS: Performed by: SURGERY

## 2019-12-30 PROCEDURE — 6360000002 HC RX W HCPCS: Performed by: NURSE ANESTHETIST, CERTIFIED REGISTERED

## 2019-12-30 PROCEDURE — 3700000001 HC ADD 15 MINUTES (ANESTHESIA): Performed by: SURGERY

## 2019-12-30 PROCEDURE — 7100000010 HC PHASE II RECOVERY - FIRST 15 MIN: Performed by: SURGERY

## 2019-12-30 PROCEDURE — 2709999900 HC NON-CHARGEABLE SUPPLY: Performed by: SURGERY

## 2019-12-30 PROCEDURE — 80048 BASIC METABOLIC PNL TOTAL CA: CPT

## 2019-12-30 PROCEDURE — 88302 TISSUE EXAM BY PATHOLOGIST: CPT

## 2019-12-30 PROCEDURE — 2580000003 HC RX 258: Performed by: ANESTHESIOLOGY

## 2019-12-30 PROCEDURE — S2900 ROBOTIC SURGICAL SYSTEM: HCPCS | Performed by: SURGERY

## 2019-12-30 PROCEDURE — 6360000002 HC RX W HCPCS: Performed by: ANESTHESIOLOGY

## 2019-12-30 PROCEDURE — 3600000009 HC SURGERY ROBOT BASE: Performed by: SURGERY

## 2019-12-30 PROCEDURE — 2500000003 HC RX 250 WO HCPCS: Performed by: SURGERY

## 2019-12-30 PROCEDURE — 6370000000 HC RX 637 (ALT 250 FOR IP): Performed by: ANESTHESIOLOGY

## 2019-12-30 PROCEDURE — 3600000019 HC SURGERY ROBOT ADDTL 15MIN: Performed by: SURGERY

## 2019-12-30 PROCEDURE — 2580000003 HC RX 258: Performed by: SURGERY

## 2019-12-30 PROCEDURE — 3700000000 HC ANESTHESIA ATTENDED CARE: Performed by: SURGERY

## 2019-12-30 PROCEDURE — 7100000001 HC PACU RECOVERY - ADDTL 15 MIN: Performed by: SURGERY

## 2019-12-30 PROCEDURE — 2500000003 HC RX 250 WO HCPCS: Performed by: NURSE ANESTHETIST, CERTIFIED REGISTERED

## 2019-12-30 PROCEDURE — C1781 MESH (IMPLANTABLE): HCPCS | Performed by: SURGERY

## 2019-12-30 PROCEDURE — C1760 CLOSURE DEV, VASC: HCPCS | Performed by: SURGERY

## 2019-12-30 PROCEDURE — 7100000000 HC PACU RECOVERY - FIRST 15 MIN: Performed by: SURGERY

## 2019-12-30 DEVICE — PATCH HERN L DIA3.2IN CIR W/ STRP SEPRA TECHNOLOGY ABSRB: Type: IMPLANTABLE DEVICE | Site: UMBILICAL | Status: FUNCTIONAL

## 2019-12-30 RX ORDER — FENTANYL CITRATE 50 UG/ML
25 INJECTION, SOLUTION INTRAMUSCULAR; INTRAVENOUS EVERY 5 MIN PRN
Status: DISCONTINUED | OUTPATIENT
Start: 2019-12-30 | End: 2019-12-30 | Stop reason: HOSPADM

## 2019-12-30 RX ORDER — KETOROLAC TROMETHAMINE 30 MG/ML
INJECTION, SOLUTION INTRAMUSCULAR; INTRAVENOUS PRN
Status: DISCONTINUED | OUTPATIENT
Start: 2019-12-30 | End: 2019-12-30 | Stop reason: SDUPTHER

## 2019-12-30 RX ORDER — GLYCOPYRROLATE 1 MG/5 ML
SYRINGE (ML) INTRAVENOUS PRN
Status: DISCONTINUED | OUTPATIENT
Start: 2019-12-30 | End: 2019-12-30 | Stop reason: SDUPTHER

## 2019-12-30 RX ORDER — IBUPROFEN 800 MG/1
800 TABLET ORAL 2 TIMES DAILY PRN
Qty: 60 TABLET | Refills: 1 | Status: SHIPPED | OUTPATIENT
Start: 2019-12-30 | End: 2020-05-19

## 2019-12-30 RX ORDER — CYCLOBENZAPRINE HCL 10 MG
10 TABLET ORAL NIGHTLY PRN
Qty: 30 TABLET | Refills: 0 | Status: SHIPPED | OUTPATIENT
Start: 2019-12-30 | End: 2020-01-09

## 2019-12-30 RX ORDER — SODIUM CHLORIDE, SODIUM LACTATE, POTASSIUM CHLORIDE, CALCIUM CHLORIDE 600; 310; 30; 20 MG/100ML; MG/100ML; MG/100ML; MG/100ML
INJECTION, SOLUTION INTRAVENOUS CONTINUOUS
Status: DISCONTINUED | OUTPATIENT
Start: 2019-12-30 | End: 2019-12-30 | Stop reason: HOSPADM

## 2019-12-30 RX ORDER — BUPIVACAINE HYDROCHLORIDE AND EPINEPHRINE 5; 5 MG/ML; UG/ML
INJECTION, SOLUTION EPIDURAL; INTRACAUDAL; PERINEURAL PRN
Status: DISCONTINUED | OUTPATIENT
Start: 2019-12-30 | End: 2019-12-30 | Stop reason: ALTCHOICE

## 2019-12-30 RX ORDER — MAGNESIUM HYDROXIDE 1200 MG/15ML
LIQUID ORAL CONTINUOUS PRN
Status: COMPLETED | OUTPATIENT
Start: 2019-12-30 | End: 2019-12-30

## 2019-12-30 RX ORDER — FENTANYL CITRATE 50 UG/ML
50 INJECTION, SOLUTION INTRAMUSCULAR; INTRAVENOUS EVERY 5 MIN PRN
Status: DISCONTINUED | OUTPATIENT
Start: 2019-12-30 | End: 2019-12-30 | Stop reason: HOSPADM

## 2019-12-30 RX ORDER — LIDOCAINE HYDROCHLORIDE 10 MG/ML
INJECTION, SOLUTION EPIDURAL; INFILTRATION; INTRACAUDAL; PERINEURAL PRN
Status: DISCONTINUED | OUTPATIENT
Start: 2019-12-30 | End: 2019-12-30 | Stop reason: SDUPTHER

## 2019-12-30 RX ORDER — PROPOFOL 10 MG/ML
INJECTION, EMULSION INTRAVENOUS PRN
Status: DISCONTINUED | OUTPATIENT
Start: 2019-12-30 | End: 2019-12-30 | Stop reason: SDUPTHER

## 2019-12-30 RX ORDER — HEPARIN SODIUM 5000 [USP'U]/ML
5000 INJECTION, SOLUTION INTRAVENOUS; SUBCUTANEOUS
Status: COMPLETED | OUTPATIENT
Start: 2019-12-30 | End: 2019-12-30

## 2019-12-30 RX ORDER — OXYCODONE HYDROCHLORIDE AND ACETAMINOPHEN 5; 325 MG/1; MG/1
1 TABLET ORAL EVERY 6 HOURS PRN
Qty: 28 TABLET | Refills: 0 | Status: SHIPPED | OUTPATIENT
Start: 2019-12-30 | End: 2020-01-06

## 2019-12-30 RX ORDER — ROCURONIUM BROMIDE 10 MG/ML
INJECTION, SOLUTION INTRAVENOUS PRN
Status: DISCONTINUED | OUTPATIENT
Start: 2019-12-30 | End: 2019-12-30 | Stop reason: SDUPTHER

## 2019-12-30 RX ORDER — ONDANSETRON 2 MG/ML
INJECTION INTRAMUSCULAR; INTRAVENOUS PRN
Status: DISCONTINUED | OUTPATIENT
Start: 2019-12-30 | End: 2019-12-30 | Stop reason: SDUPTHER

## 2019-12-30 RX ORDER — FENTANYL CITRATE 50 UG/ML
INJECTION, SOLUTION INTRAMUSCULAR; INTRAVENOUS PRN
Status: DISCONTINUED | OUTPATIENT
Start: 2019-12-30 | End: 2019-12-30 | Stop reason: SDUPTHER

## 2019-12-30 RX ORDER — DEXAMETHASONE SODIUM PHOSPHATE 10 MG/ML
INJECTION INTRAMUSCULAR; INTRAVENOUS PRN
Status: DISCONTINUED | OUTPATIENT
Start: 2019-12-30 | End: 2019-12-30 | Stop reason: SDUPTHER

## 2019-12-30 RX ORDER — NEOSTIGMINE METHYLSULFATE 5 MG/5 ML
SYRINGE (ML) INTRAVENOUS PRN
Status: DISCONTINUED | OUTPATIENT
Start: 2019-12-30 | End: 2019-12-30 | Stop reason: SDUPTHER

## 2019-12-30 RX ORDER — OXYCODONE HYDROCHLORIDE AND ACETAMINOPHEN 5; 325 MG/1; MG/1
1 TABLET ORAL ONCE
Status: COMPLETED | OUTPATIENT
Start: 2019-12-30 | End: 2019-12-30

## 2019-12-30 RX ADMIN — FENTANYL CITRATE 100 MCG: 50 INJECTION INTRAMUSCULAR; INTRAVENOUS at 10:57

## 2019-12-30 RX ADMIN — FENTANYL CITRATE 50 MCG: 50 INJECTION, SOLUTION INTRAMUSCULAR; INTRAVENOUS at 12:40

## 2019-12-30 RX ADMIN — PROPOFOL 200 MG: 10 INJECTION, EMULSION INTRAVENOUS at 10:57

## 2019-12-30 RX ADMIN — DEXAMETHASONE SODIUM PHOSPHATE 10 MG: 10 INJECTION INTRAMUSCULAR; INTRAVENOUS at 11:04

## 2019-12-30 RX ADMIN — FENTANYL CITRATE 50 MCG: 50 INJECTION, SOLUTION INTRAMUSCULAR; INTRAVENOUS at 12:55

## 2019-12-30 RX ADMIN — Medication 3 G: at 11:03

## 2019-12-30 RX ADMIN — ONDANSETRON 4 MG: 2 INJECTION, SOLUTION INTRAMUSCULAR; INTRAVENOUS at 11:57

## 2019-12-30 RX ADMIN — FENTANYL CITRATE 100 MCG: 50 INJECTION INTRAMUSCULAR; INTRAVENOUS at 11:16

## 2019-12-30 RX ADMIN — SODIUM CHLORIDE, POTASSIUM CHLORIDE, SODIUM LACTATE AND CALCIUM CHLORIDE: 600; 310; 30; 20 INJECTION, SOLUTION INTRAVENOUS at 09:47

## 2019-12-30 RX ADMIN — SODIUM CHLORIDE, POTASSIUM CHLORIDE, SODIUM LACTATE AND CALCIUM CHLORIDE: 600; 310; 30; 20 INJECTION, SOLUTION INTRAVENOUS at 10:48

## 2019-12-30 RX ADMIN — OXYCODONE HYDROCHLORIDE AND ACETAMINOPHEN 1 TABLET: 5; 325 TABLET ORAL at 13:40

## 2019-12-30 RX ADMIN — HEPARIN SODIUM 5000 UNITS: 5000 INJECTION INTRAVENOUS; SUBCUTANEOUS at 09:53

## 2019-12-30 RX ADMIN — Medication 0.3 MG: at 11:59

## 2019-12-30 RX ADMIN — Medication 4 MG: at 11:59

## 2019-12-30 RX ADMIN — LIDOCAINE HYDROCHLORIDE 50 MG: 10 INJECTION, SOLUTION EPIDURAL; INFILTRATION; INTRACAUDAL; PERINEURAL at 10:57

## 2019-12-30 RX ADMIN — ROCURONIUM BROMIDE 50 MG: 10 INJECTION INTRAVENOUS at 10:57

## 2019-12-30 RX ADMIN — KETOROLAC TROMETHAMINE 30 MG: 30 INJECTION, SOLUTION INTRAMUSCULAR; INTRAVENOUS at 11:57

## 2019-12-30 ASSESSMENT — PULMONARY FUNCTION TESTS
PIF_VALUE: 3
PIF_VALUE: 20
PIF_VALUE: 31
PIF_VALUE: 0
PIF_VALUE: 22
PIF_VALUE: 0
PIF_VALUE: 31
PIF_VALUE: 31
PIF_VALUE: 33
PIF_VALUE: 32
PIF_VALUE: 33
PIF_VALUE: 19
PIF_VALUE: 31
PIF_VALUE: 0
PIF_VALUE: 31
PIF_VALUE: 32
PIF_VALUE: 1
PIF_VALUE: 20
PIF_VALUE: 31
PIF_VALUE: 32
PIF_VALUE: 31
PIF_VALUE: 32
PIF_VALUE: 33
PIF_VALUE: 20
PIF_VALUE: 31
PIF_VALUE: 23
PIF_VALUE: 2
PIF_VALUE: 31
PIF_VALUE: 22
PIF_VALUE: 23
PIF_VALUE: 33
PIF_VALUE: 1
PIF_VALUE: 4
PIF_VALUE: 31
PIF_VALUE: 20
PIF_VALUE: 22
PIF_VALUE: 21
PIF_VALUE: 31
PIF_VALUE: 21
PIF_VALUE: 30
PIF_VALUE: 23
PIF_VALUE: 32
PIF_VALUE: 22
PIF_VALUE: 31
PIF_VALUE: 23
PIF_VALUE: 31
PIF_VALUE: 3
PIF_VALUE: 8
PIF_VALUE: 1
PIF_VALUE: 33
PIF_VALUE: 33
PIF_VALUE: 31
PIF_VALUE: 13
PIF_VALUE: 31
PIF_VALUE: 3
PIF_VALUE: 32
PIF_VALUE: 33
PIF_VALUE: 22
PIF_VALUE: 32
PIF_VALUE: 31
PIF_VALUE: 19
PIF_VALUE: 33
PIF_VALUE: 31
PIF_VALUE: 31
PIF_VALUE: 32
PIF_VALUE: 31
PIF_VALUE: 21
PIF_VALUE: 32
PIF_VALUE: 19
PIF_VALUE: 22
PIF_VALUE: 20
PIF_VALUE: 31
PIF_VALUE: 1
PIF_VALUE: 23
PIF_VALUE: 32
PIF_VALUE: 6
PIF_VALUE: 26
PIF_VALUE: 32
PIF_VALUE: 32
PIF_VALUE: 28
PIF_VALUE: 31
PIF_VALUE: 24

## 2019-12-30 ASSESSMENT — PAIN DESCRIPTION - LOCATION: LOCATION: ABDOMEN

## 2019-12-30 ASSESSMENT — PAIN DESCRIPTION - DESCRIPTORS: DESCRIPTORS: ACHING;CRAMPING

## 2019-12-30 ASSESSMENT — PAIN - FUNCTIONAL ASSESSMENT: PAIN_FUNCTIONAL_ASSESSMENT: 0-10

## 2019-12-30 ASSESSMENT — PAIN DESCRIPTION - ONSET: ONSET: AWAKENED FROM SLEEP

## 2019-12-30 ASSESSMENT — PAIN DESCRIPTION - PAIN TYPE: TYPE: SURGICAL PAIN

## 2019-12-30 ASSESSMENT — LIFESTYLE VARIABLES: SMOKING_STATUS: 1

## 2019-12-30 ASSESSMENT — PAIN SCALES - GENERAL
PAINLEVEL_OUTOF10: 5
PAINLEVEL_OUTOF10: 7
PAINLEVEL_OUTOF10: 8
PAINLEVEL_OUTOF10: 6
PAINLEVEL_OUTOF10: 6
PAINLEVEL_OUTOF10: 4

## 2019-12-30 ASSESSMENT — PAIN DESCRIPTION - ORIENTATION: ORIENTATION: MID;LEFT

## 2019-12-30 ASSESSMENT — PAIN DESCRIPTION - FREQUENCY: FREQUENCY: CONTINUOUS

## 2019-12-30 ASSESSMENT — PAIN DESCRIPTION - PROGRESSION: CLINICAL_PROGRESSION: GRADUALLY WORSENING

## 2019-12-31 LAB — SURGICAL PATHOLOGY REPORT: NORMAL

## 2020-01-07 ENCOUNTER — OFFICE VISIT (OUTPATIENT)
Dept: BARIATRICS/WEIGHT MGMT | Age: 55
End: 2020-01-07

## 2020-01-07 VITALS
TEMPERATURE: 97.5 F | BODY MASS INDEX: 36.45 KG/M2 | SYSTOLIC BLOOD PRESSURE: 116 MMHG | RESPIRATION RATE: 20 BRPM | HEART RATE: 76 BPM | DIASTOLIC BLOOD PRESSURE: 88 MMHG | HEIGHT: 78 IN | WEIGHT: 315 LBS

## 2020-01-07 PROCEDURE — 99024 POSTOP FOLLOW-UP VISIT: CPT | Performed by: SURGERY

## 2020-05-11 ENCOUNTER — HOSPITAL ENCOUNTER (EMERGENCY)
Age: 55
Discharge: HOME | End: 2020-05-11
Attending: EMERGENCY MEDICINE
Payer: COMMERCIAL

## 2020-05-11 VITALS
BODY MASS INDEX: 36.45 KG/M2 | DIASTOLIC BLOOD PRESSURE: 99 MMHG | RESPIRATION RATE: 12 BRPM | HEIGHT: 78 IN | SYSTOLIC BLOOD PRESSURE: 141 MMHG | HEART RATE: 76 BPM | TEMPERATURE: 97.1 F | OXYGEN SATURATION: 97 % | WEIGHT: 315 LBS

## 2020-05-11 PROCEDURE — 2500000003 HC RX 250 WO HCPCS: Performed by: PHYSICIAN ASSISTANT

## 2020-05-11 PROCEDURE — 99283 EMERGENCY DEPT VISIT LOW MDM: CPT

## 2020-05-11 PROCEDURE — 12001 RPR S/N/AX/GEN/TRNK 2.5CM/<: CPT

## 2020-05-11 RX ORDER — LIDOCAINE HYDROCHLORIDE 10 MG/ML
20 INJECTION, SOLUTION INFILTRATION; PERINEURAL ONCE
Status: COMPLETED | OUTPATIENT
Start: 2020-05-11 | End: 2020-05-11

## 2020-05-11 RX ORDER — CEPHALEXIN 500 MG/1
500 CAPSULE ORAL 3 TIMES DAILY
Qty: 30 CAPSULE | Refills: 0 | Status: SHIPPED | OUTPATIENT
Start: 2020-05-11 | End: 2020-05-19

## 2020-05-11 RX ADMIN — LIDOCAINE HYDROCHLORIDE 20 ML: 10 INJECTION, SOLUTION INFILTRATION; PERINEURAL at 13:59

## 2020-05-11 ASSESSMENT — PAIN DESCRIPTION - ORIENTATION: ORIENTATION: LEFT

## 2020-05-11 ASSESSMENT — PAIN SCALES - GENERAL
PAINLEVEL_OUTOF10: 6
PAINLEVEL_OUTOF10: 6

## 2020-05-11 ASSESSMENT — PAIN DESCRIPTION - PAIN TYPE: TYPE: ACUTE PAIN

## 2020-05-11 ASSESSMENT — PAIN DESCRIPTION - LOCATION: LOCATION: HAND

## 2020-05-11 NOTE — ED PROVIDER NOTES
[address] ED  eMERGENCY dEPARTMENT eNCOUnter      Pt Name: Monisha Martino  MRN: [id number]  [place] 1965  Date of evaluation: 5/11/2020  Provider: Izabel Rodriguez PA-C    CHIEF COMPLAINT       Chief Complaint   Patient presents with    Laceration           HISTORY OF PRESENT ILLNESS  (Location/Symptom, Timing/Onset, Context/Setting, Quality, Duration, Modifying Factors, Severity.)   Monisha Martino is a 47 y.o. male who presents to the emergency department c/o laceration to the left 2nd finger. Pt states he was remodeling kitchen and cut finger with metal after using a drill. Pt states pain is 6/10. Reports some tingling. Denies numbness. Pt is on eliquis. No other complaints. Tetanus is up to date. Pt states he is right and left handed. Nursing Notes were reviewed. REVIEW OF SYSTEMS    (2-9 systems for level 4, 10 or more for level 5)     Review of Systems   Laceration to left 2nd digit     Except as noted above the remainder of the review of systems was reviewed and negative. PAST MEDICAL HISTORY     Past Medical History:   Diagnosis Date    Atrial fibrillation [facility])     [address]    Cancer [facility])     right arm, at site of right arm surgical scar    H/O upper respiratory infection     Hyperlipidemia     Kidney stones     ALLEY on CPAP     Umbilical hernia     Varicose vein of leg     Wellness examination     Dr. Serene Mueller.  [id number]     None otherwise stated in nurses notes    SURGICAL HISTORY       Past Surgical History:   Procedure Laterality Date    ARM SURGERY Right     x5, tendon injury    ARM SURGERY Left 01/26/2018    extensor tendon repair    CARDIOVERSION      x2, says likely to have another in february 2020    COLONOSCOPY      EYE MUSCLE SURGERY Left as a child    HERNIA REPAIR N/A 12/30/2019    XI LAPAROSCOPIC ROBOTIC [facility] performed by Ashely López MD at [address] [place] [address] SURGERY      KIDNEY SURGERY Right     ureter, born with 2 right kidneys    TONSILLECTOMY      UMBILICAL HERNIA REPAIR  2019    LAPAROSCOPIC ROBOTIC UMBILICAL HERNIA REPAIR WITH MESH     VASECTOMY       None otherwise stated in nurses notes    CURRENT MEDICATIONS       Previous Medications    ALLOPURINOL (ZYLOPRIM) 100 MG TABLET    TAKE ONE TABLET BY MOUTH DAILY    APIXABAN (ELIQUIS) 5 MG TABS TABLET    Take 5 mg by mouth Stopping     ATENOLOL (TENORMIN) 50 MG TABLET    TAKE ONE TABLET BY MOUTH DAILY    ATORVASTATIN (LIPITOR) 20 MG TABLET    TAKE ONE TABLET BY MOUTH DAILY    IBUPROFEN (ADVIL;MOTRIN) 800 MG TABLET    Take 1 tablet by mouth 2 times daily as needed for Pain       ALLERGIES     Patient has no known allergies. FAMILY HISTORY           Problem Relation Age of Onset    Coronary Art Dis Mother     Stroke Mother     Cancer Brother         malignant melanoma of skin    Other Brother         seizures, bipolar, memory loss    Diabetes Maternal Uncle     Lung Cancer Maternal Grandfather     Coronary Art Dis Paternal Grandmother     Cancer Other         breast    Other Father         dementia    High Blood Pressure Sister     Asthma Sister     Arthritis Sister     High Blood Pressure Sister     Other Sister     Arthritis Sister      Family Status   Relation Name Status    Mother      Brother  Alive    MUnc      MGF      PGM      Other  (Not Specified)    Father  Alive    Sister  Alive   Margaree Prince      PAunt      PUnc      MGM      PGF      Sister  Alive      None otherwise stated in nurses notes    SOCIAL HISTORY      reports that he has been smoking cigars. He has never used smokeless tobacco. He reports current alcohol use. He reports that he does not use drugs.    lives at home with others     PHYSICAL EXAM    (up to 7 for level 4, 8 or more for level 5)     ED Triage Vitals [20 1222]   BP

## 2020-05-19 ENCOUNTER — OFFICE VISIT (OUTPATIENT)
Dept: PRIMARY CARE CLINIC | Age: 55
End: 2020-05-19
Payer: COMMERCIAL

## 2020-05-19 VITALS
SYSTOLIC BLOOD PRESSURE: 136 MMHG | OXYGEN SATURATION: 97 % | TEMPERATURE: 97.7 F | WEIGHT: 315 LBS | HEIGHT: 78 IN | BODY MASS INDEX: 36.45 KG/M2 | DIASTOLIC BLOOD PRESSURE: 80 MMHG | HEART RATE: 71 BPM

## 2020-05-19 PROCEDURE — 99213 OFFICE O/P EST LOW 20 MIN: CPT | Performed by: FAMILY MEDICINE

## 2020-05-19 RX ORDER — FLECAINIDE ACETATE 100 MG/1
100 TABLET ORAL 2 TIMES DAILY
COMMUNITY
Start: 2020-01-20

## 2020-05-19 RX ORDER — GABAPENTIN 100 MG/1
100 CAPSULE ORAL 3 TIMES DAILY
COMMUNITY
Start: 2020-03-09 | End: 2021-02-03 | Stop reason: ALTCHOICE

## 2020-05-19 RX ORDER — DILTIAZEM HYDROCHLORIDE 120 MG/1
120 CAPSULE, COATED, EXTENDED RELEASE ORAL DAILY
COMMUNITY
Start: 2020-01-20

## 2020-05-19 ASSESSMENT — ENCOUNTER SYMPTOMS
COUGH: 0
RHINORRHEA: 0
WHEEZING: 0
SHORTNESS OF BREATH: 0
NAUSEA: 0
DIARRHEA: 0
VOMITING: 0
EYE REDNESS: 0
ABDOMINAL PAIN: 0
EYE DISCHARGE: 0
SORE THROAT: 0

## 2020-05-19 ASSESSMENT — PATIENT HEALTH QUESTIONNAIRE - PHQ9
SUM OF ALL RESPONSES TO PHQ QUESTIONS 1-9: 0
SUM OF ALL RESPONSES TO PHQ QUESTIONS 1-9: 0
2. FEELING DOWN, DEPRESSED OR HOPELESS: 0
1. LITTLE INTEREST OR PLEASURE IN DOING THINGS: 0
SUM OF ALL RESPONSES TO PHQ9 QUESTIONS 1 & 2: 0

## 2020-05-19 NOTE — PROGRESS NOTES
N/A 12/30/2019    XI LAPAROSCOPIC ROBOTIC UMBILICAL HERNIA REPAIR WITH MESH performed by Daisha Kaufman MD at [address] Right     ureter, born with 2 right kidneys    TONSILLECTOMY      UMBILICAL HERNIA REPAIR  12/30/2019    LAPAROSCOPIC ROBOTIC UMBILICAL HERNIA REPAIR WITH MESH     VASECTOMY         Family History   Problem Relation Age of Onset    Coronary Art Dis Mother     Stroke Mother     Cancer Brother         malignant melanoma of skin    Other Brother         seizures, bipolar, memory loss    Diabetes Maternal Uncle     Lung Cancer Maternal Grandfather     Coronary Art Dis Paternal Grandmother     Cancer Other         breast    Other Father         dementia    High Blood Pressure Sister     Asthma Sister     Arthritis Sister     High Blood Pressure Sister     Other Sister     Arthritis Sister        Social History     Tobacco Use    Smoking status: Current Some Day Smoker     Packs/day: 0.00     Years: 10.00     Pack years: 0.00     Types: Cigars    Smokeless tobacco: Never Used    Tobacco comment: I have a few cigars per week   Substance Use Topics    Alcohol use: Yes     Alcohol/week: 4.0 standard drinks     Types: 2 Cans of beer, 2 Standard drinks or equivalent per week     Comment: occ      Current Outpatient Medications   Medication Sig Dispense Refill    dilTIAZem (DILTIAZEM CD) 120 MG extended release capsule Take 120 mg by mouth daily      flecainide (TAMBOCOR) 100 MG tablet Take 100 mg by mouth 2 times daily      gabapentin (NEURONTIN) 100 MG capsule Take 100 mg by mouth 3 times daily.       atenolol (TENORMIN) 50 MG tablet TAKE ONE TABLET BY MOUTH DAILY 90 tablet 3    allopurinol (ZYLOPRIM) 100 MG tablet TAKE ONE TABLET BY MOUTH DAILY 90 tablet 3    atorvastatin (LIPITOR) 20 MG tablet TAKE ONE TABLET BY MOUTH DAILY 90 tablet 3    apixaban (ELIQUIS) 5 MG TABS tablet Take 5 mg by mouth Stopping 12/27       No current facility-administered medications for this visit. No Known Allergies    Health Maintenance   Topic Date Due    HIV screen  11/20/1980    Lipid screen  06/25/2020    Flu vaccine (Season Ended) 09/01/2020    Potassium monitoring  12/30/2020    Creatinine monitoring  12/30/2020    Colon cancer screen colonoscopy  01/15/2026    DTaP/Tdap/Td vaccine (3 - Td) 08/15/2028    Shingles Vaccine  Completed    Pneumococcal 0-64 years Vaccine  Completed    Hepatitis C screen  Completed    Hepatitis A vaccine  Aged Out    Hepatitis B vaccine  Aged Out    Hib vaccine  Aged Out    Meningococcal (ACWY) vaccine  Aged Out       Subjective:      Review of Systems   Constitutional: Negative for chills and fever. HENT: Negative for rhinorrhea and sore throat. Eyes: Negative for discharge and redness. Respiratory: Negative for cough, shortness of breath and wheezing. Cardiovascular: Negative for chest pain and palpitations. Gastrointestinal: Negative for abdominal pain, diarrhea, nausea and vomiting. Genitourinary: Negative for dysuria and frequency. Musculoskeletal: Negative for arthralgias and myalgias. Neurological: Negative for dizziness, light-headedness and headaches. Psychiatric/Behavioral: Negative for sleep disturbance. Objective:     /80   Pulse 71   Temp 97.7 °F (36.5 °C)   Ht 6' 6.96\" (2.006 m)   Wt (!) 369 lb 3.2 oz (167.5 kg)   SpO2 97%   BMI 41.63 kg/m²   Physical Exam  Vitals signs and nursing note reviewed. Constitutional:       General: He is not in acute distress. Appearance: He is well-developed. HENT:      Head: Normocephalic and atraumatic. Eyes:      General: No scleral icterus. Right eye: No discharge. Left eye: No discharge. Conjunctiva/sclera: Conjunctivae normal.      Pupils: Pupils are equal, round, and reactive to light. Neck:      Thyroid: No thyromegaly. Trachea: No tracheal deviation.    Cardiovascular:      Rate and patient instructions. Discussed use, benefit, and side effectsof prescribed medications. All patient questions answered. Pt voiced understanding. Reviewed health maintenance. Instructed to continue current medications, diet andexercise. Patient agreed with treatment plan. Follow up as directed.      Electronicallysigned by Ifrah Garsia MD on 5/19/2020 at 11:11 AM

## 2020-06-01 ENCOUNTER — HOSPITAL ENCOUNTER (OUTPATIENT)
Dept: MRI IMAGING | Facility: CLINIC | Age: 55
Discharge: HOME | End: 2020-06-03
Payer: COMMERCIAL

## 2020-06-01 PROCEDURE — 73721 MRI JNT OF LWR EXTRE W/O DYE: CPT

## 2020-07-10 ENCOUNTER — HOSPITAL ENCOUNTER (OUTPATIENT)
Age: 55
Discharge: HOME | End: 2020-07-10
Payer: COMMERCIAL

## 2020-07-10 LAB
ALBUMIN SERPL-MCNC: 4.1 G/DL (ref 3.5–5.2)
ALBUMIN/GLOBULIN RATIO: NORMAL (ref 1–2.5)
ALP BLD-CCNC: 99 U/L (ref 40–129)
ALT SERPL-CCNC: 24 U/L (ref 5–41)
ANION GAP SERPL CALCULATED.3IONS-SCNC: 9 MMOL/L (ref 9–17)
AST SERPL-CCNC: 15 U/L
BILIRUB SERPL-MCNC: 0.42 MG/DL (ref 0.3–1.2)
BILIRUBIN DIRECT: 0.12 MG/DL
BILIRUBIN, INDIRECT: 0.3 MG/DL (ref 0–1)
BUN BLDV-MCNC: 13 MG/DL (ref 6–20)
BUN/CREAT BLD: ABNORMAL (ref 9–20)
CALCIUM SERPL-MCNC: 9.3 MG/DL (ref 8.6–10.4)
CHLORIDE BLD-SCNC: 106 MMOL/L (ref 98–107)
CHOLESTEROL, FASTING: 158 MG/DL
CHOLESTEROL/HDL RATIO: 2.7
CO2: 24 MMOL/L (ref 20–31)
CREAT SERPL-MCNC: 0.91 MG/DL (ref 0.7–1.2)
GFR AFRICAN AMERICAN: >60 ML/MIN
GFR NON-AFRICAN AMERICAN: >60 ML/MIN
GFR SERPL CREATININE-BSD FRML MDRD: ABNORMAL ML/MIN/{1.73_M2}
GFR SERPL CREATININE-BSD FRML MDRD: ABNORMAL ML/MIN/{1.73_M2}
GLOBULIN: NORMAL G/DL (ref 1.5–3.8)
GLUCOSE FASTING: 108 MG/DL (ref 70–99)
HDLC SERPL-MCNC: 58 MG/DL
LDL CHOLESTEROL: 88 MG/DL (ref 0–130)
POTASSIUM SERPL-SCNC: 4 MMOL/L (ref 3.7–5.3)
PROSTATE SPECIFIC ANTIGEN: 1.43 UG/L
SODIUM BLD-SCNC: 139 MMOL/L (ref 135–144)
TOTAL PROTEIN: 7.5 G/DL (ref 6.4–8.3)
TRIGLYCERIDE, FASTING: 61 MG/DL
VLDLC SERPL CALC-MCNC: NORMAL MG/DL (ref 1–30)

## 2020-07-10 PROCEDURE — 36415 COLL VENOUS BLD VENIPUNCTURE: CPT

## 2020-07-10 PROCEDURE — 80061 LIPID PANEL: CPT

## 2020-07-10 PROCEDURE — G0103 PSA SCREENING: HCPCS

## 2020-07-10 PROCEDURE — 80076 HEPATIC FUNCTION PANEL: CPT

## 2020-07-10 PROCEDURE — 80048 BASIC METABOLIC PNL TOTAL CA: CPT

## 2020-10-05 RX ORDER — ATORVASTATIN CALCIUM 20 MG/1
TABLET, FILM COATED ORAL
Qty: 30 TABLET | Refills: 5 | Status: SHIPPED | OUTPATIENT
Start: 2020-10-05 | End: 2022-01-11 | Stop reason: ALTCHOICE

## 2020-10-27 ENCOUNTER — OFFICE VISIT (OUTPATIENT)
Dept: PODIATRY | Age: 55
End: 2020-10-27
Payer: COMMERCIAL

## 2020-10-27 VITALS — BODY MASS INDEX: 36.45 KG/M2 | HEIGHT: 78 IN | WEIGHT: 315 LBS

## 2020-10-27 PROCEDURE — 99213 OFFICE O/P EST LOW 20 MIN: CPT | Performed by: PODIATRIST

## 2020-10-27 RX ORDER — GABAPENTIN 300 MG/1
300 CAPSULE ORAL 3 TIMES DAILY
Qty: 90 CAPSULE | Refills: 3 | Status: SHIPPED | OUTPATIENT
Start: 2020-10-27 | End: 2021-02-22

## 2020-10-27 ASSESSMENT — ENCOUNTER SYMPTOMS
DIARRHEA: 0
SHORTNESS OF BREATH: 0
COLOR CHANGE: 0
NAUSEA: 0
BACK PAIN: 0

## 2020-10-27 NOTE — PROGRESS NOTES
[facility] Podiatry  Return Patient Progress Note    Subjective: Ritu Seay 47 y.o. male that presents with chief complaint of numbness to both feet. Chief Complaint   Patient presents with    Numbness     numbness in feet    Nail Problem     thick right 2nd toenail     Patient states that this has been present for about one year. Pain is rated 6 out of 10 and is described as intermittent. Patient states that he thought it was a vascular issue so he got Jobst stockings, but this did not help. Patient states that he was told he has neuropathy and was placed on gabapentin since March of this year, but he states it is not helping. Patient denies being a diabetic. Patient also complains of pain and thickness to the right second toenail. Review of Systems   Constitutional: Negative for activity change, appetite change, chills, diaphoresis, fatigue and fever. Respiratory: Negative for shortness of breath. Cardiovascular: Negative for leg swelling. Gastrointestinal: Negative for diarrhea and nausea. Endocrine: Negative for cold intolerance, heat intolerance and polyuria. Musculoskeletal: Positive for arthralgias. Negative for back pain, gait problem, joint swelling and myalgias. Skin: Negative for color change, pallor, rash and wound. Allergic/Immunologic: Negative for environmental allergies and food allergies. Neurological: Negative for dizziness, weakness, light-headedness and numbness. Hematological: Does not bruise/bleed easily. Psychiatric/Behavioral: Negative for behavioral problems, confusion and self-injury. The patient is not nervous/anxious. Objective: Clinical evaluation of the patient reveals loss of protective sensation to the plantar aspect of the bilateral feet with a 5.07 Coward-Juan Pablo monofilament. Pedal pulses are palpable bilaterally. There are is no erythema, calor, or open wound noted to either foot.  There is a generalized pain with manipulation to both feet. The right second toenail presents with thickness, discoloration, elongation, brittleness, and subungual debris. There is pain with palpation and debridement of this nail. Assessment:    Diagnosis Orders   1. Idiopathic neuropathy  gabapentin (NEURONTIN) 300 MG capsule    JavierStephanie, Neurosurgery, [place]   2. Pain in left foot  gabapentin (NEURONTIN) 300 MG capsule    JavierStephanie, Neurosurgery, [place]   3. Pain in right foot  gabapentin (NEURONTIN) 300 MG capsule    JavierHill, Neurosurgery, [address]: 1. Clinical evaluation of the patient. 2. Patient informed that I believe his dose of gabapentin is too low. I gave the patient a new prescription for gabapentin 300 mg tid. The patient was also given a referral to neurosurgery for evaluation of his back to see if this is what is causing his neuropathy. The right second toenail was debrided in both thickness and length with a nail nipper and . 3. Return if symptoms worsen or fail to improve.    10/27/2020      Dio Francisco DPM

## 2020-11-12 ENCOUNTER — OFFICE VISIT (OUTPATIENT)
Dept: NEUROSURGERY | Age: 55
End: 2020-11-12
Payer: COMMERCIAL

## 2020-11-12 VITALS
TEMPERATURE: 98 F | WEIGHT: 315 LBS | DIASTOLIC BLOOD PRESSURE: 136 MMHG | BODY MASS INDEX: 36.45 KG/M2 | HEIGHT: 78 IN | SYSTOLIC BLOOD PRESSURE: 200 MMHG | HEART RATE: 78 BPM

## 2020-11-12 PROCEDURE — 99203 OFFICE O/P NEW LOW 30 MIN: CPT | Performed by: NURSE PRACTITIONER

## 2020-11-12 NOTE — PROGRESS NOTES
P [facility]  [facility] NEUROSURGERY  [address], [place] [address]  Dept: [phone]    Patient:  Shruti Navarro  YOB: 1965  Date: 11/12/20    The patient is a 47 y.o. male who presents today for consult of the following problems:     Chief Complaint   Patient presents with    New Patient       HPI:     Shruti Navarro is a 47 y.o. male on whom neurosurgical consultation was requested by Bradley Oakley MD for management of numbness and tingling to bilateral feet. Has had numbness and tingling for the last year. Does have some associated aching to feet after being on them for a long time. Does feel that symptoms are somewhat progressive. Has been evaluated by vascular surgery, as well as podiatry without any identifiable etiology. Denies history of diabetes. Denies any back pain, or previous back issues. Does have occasional neck pain, as well as intermittent numbness and tingling and dexterity issues to bilateral hands. Has had numerous surgeries to both arms, as well as multiple injuries to both arms. History:     Past Medical History:   Diagnosis Date    Atrial fibrillation [facility])     [address]    Cancer [facility])     right arm, at site of right arm surgical scar    H/O upper respiratory infection     Hyperlipidemia     Kidney stones     ALLEY on CPAP     Umbilical hernia     Varicose vein of leg     Wellness examination     Dr. Charla Rodriguez.  [id number]     Past Surgical History:   Procedure Laterality Date    ARM SURGERY Right     x5, tendon injury    ARM SURGERY Left 01/26/2018    extensor tendon repair    CARDIOVERSION      x2, says likely to have another in february 2020    COLONOSCOPY      EYE MUSCLE SURGERY Left as a child    HERNIA REPAIR N/A 12/30/2019    XI LAPAROSCOPIC ROBOTIC [facility] performed by Corrina Sheriff MD at [address] Right     ureter, born with 2 right kidneys    TONSILLECTOMY      UMBILICAL HERNIA REPAIR  12/30/2019    LAPAROSCOPIC ROBOTIC UMBILICAL HERNIA REPAIR WITH MESH     VASECTOMY       Family History   Problem Relation Age of Onset    Coronary Art Dis Mother     Stroke Mother     Cancer Brother         malignant melanoma of skin    Other Brother         seizures, bipolar, memory loss    Diabetes Maternal Uncle     Lung Cancer Maternal Grandfather     Coronary Art Dis Paternal Grandmother     Cancer Other         breast    Other Father         dementia    High Blood Pressure Sister     Asthma Sister     Arthritis Sister     High Blood Pressure Sister     Other Sister     Arthritis Sister      Current Outpatient Medications on File Prior to Visit   Medication Sig Dispense Refill    gabapentin (NEURONTIN) 300 MG capsule Take 1 capsule by mouth 3 times daily for 30 days. 90 capsule 3    atorvastatin (LIPITOR) 20 MG tablet TAKE ONE TABLET BY MOUTH DAILY 30 tablet 5    allopurinol (ZYLOPRIM) 100 MG tablet TAKE ONE TABLET BY MOUTH DAILY 90 tablet 3    dilTIAZem (DILTIAZEM CD) 120 MG extended release capsule Take 120 mg by mouth daily      flecainide (TAMBOCOR) 100 MG tablet Take 100 mg by mouth 2 times daily      gabapentin (NEURONTIN) 100 MG capsule Take 100 mg by mouth 3 times daily.  apixaban (ELIQUIS) 5 MG TABS tablet Take 5 mg by mouth Stopping 12/27       No current facility-administered medications on file prior to visit. Social History     Tobacco Use    Smoking status: Current Some Day Smoker     Packs/day: 0.00     Years: 10.00     Pack years: 0.00     Types: Cigars    Smokeless tobacco: Never Used    Tobacco comment: I have a few cigars per week   Substance Use Topics    Alcohol use:  Yes     Alcohol/week: 4.0 standard drinks     Types: 2 Cans of beer, 2 Standard drinks or equivalent per week     Comment: occ    Drug use: No       No Known Allergies    Review of Systems  Constitutional: Negative for activity change and appetite change. HENT: Negative for ear pain and facial swelling. Eyes: Negative for discharge and itching. Respiratory: Negative for choking and chest tightness. Cardiovascular: Negative for chest pain and leg swelling. Gastrointestinal: Negative for nausea and abdominal pain. Endocrine: Negative for cold intolerance and heat intolerance. Genitourinary: Negative for frequency and flank pain. Musculoskeletal: Negative for myalgias and joint swelling. Skin: Negative for rash and wound. Allergic/Immunologic: Negative for environmental allergies and food allergies. Hematological: Negative for adenopathy. Does not bruise/bleed easily. Psychiatric/Behavioral: Negative for self-injury. The patient is not nervous/anxious. Physical Exam:      BP (!) 200/136   Pulse 78   Temp 98 °F (36.7 °C)   Ht 6' 7\" (2.007 m)   Wt (!) 350 lb (158.8 kg)   BMI 39.43 kg/m²   Estimated body mass index is 39.43 kg/m² as calculated from the following:    Height as of this encounter: 6' 7\" (2.007 m). Weight as of this encounter: 350 lb (158.8 kg). General:  Ritu Seay is a 47y.o. year old male who appears his stated age. HEENT: Normocephalic atraumatic. Neck supple. Chest: regular rate; pulses equal  Abdomen: Soft nontender nondistended.   Ext: DP and PT pulses 2+, good cap refill  Neuro    Mentation  Appropriate affect  Registration intact  Orientation intact  3 item recall intact  Judgement intact to situation    Cranial Nerves:   Pupils equal and reactive to light  Extraocular motion intact  Face and shrug symmetric  Tongue midline  No dysarthria  v1-3 sensation symmetric, masseter tone symmetric  Hearing symmetric    Sensation: Decreased from arches through all toes on both feet    Motor  L deltoid 5/5; R deltoid 5/5  L biceps 5/5; R biceps 5/5  L triceps 5/5; R triceps 5/5  L wrist extension 5/5; R wrist extension 5/5  L intrinsics 5/5; R intrinsics 5/5     L iliopsoas 5/5 , R iliopsoas 5/5  L quadriceps 5/5; R quadriceps 5/5  L Dorsiflexion 5/5; R dorsiflexion 5/5  L Plantarflexion 5/5; R plantarflexion 5/5  L EHL 5/5; R EHL 5/5    Reflexes  L Brachioradialis 2+/4; R brachioradialis 2+/4  L Biceps 2+/4; R Biceps 2+/4  L Triceps 2+/4; R Triceps 2+/4  L Patellar 2+/4: R Patellar 2+/4  L Achilles 2+/4; R Achilles 2+/4    hoffmans L: neg  hoffmans R: neg  Clonus L: neg  Clonus R: neg  Babinski L: neg  Babinski R: neg    Studies Review:     No imaging    Assessment and Plan:      1. Numbness and tingling of both feet          Plan: Patient with persistent numbness to distal portion of bilateral feet for the last year. Has been progressive. Patient without history of diabetes. Denies any back pain, claudication symptoms. Recommend obtaining EMG bilateral lower extremities to further evaluate. Patient does also mention some issues with neck pain, dexterity issues in hands, though he has attributed this to multiple injuries to bilateral arms, as well as multiple surgeries. May consider cervical MRI pending EMG results to evaluate for any stenosis that may be contributing. Will reevaluate in approximately 4 weeks after completion of EMG. Followup: Return in about 4 weeks (around 12/10/2020), or if symptoms worsen or fail to improve. Prescriptions Ordered:  No orders of the defined types were placed in this encounter. Orders Placed:  Orders Placed This Encounter   Procedures    EMG     Standing Status:   Future     Standing Expiration Date:   11/12/2021     Order Specific Question:   Which body part? Answer:   bilateral lower extremities        Electronically signed by MARIJA Knutson CNP on 11/12/2020 at 9:46 AM    Please note that this chart was generated using voice recognition Dragon dictation software.   Although every effort was made to ensure the accuracy of this automated transcription, some errors in transcription may have occurred.

## 2021-01-07 ENCOUNTER — OFFICE VISIT (OUTPATIENT)
Dept: NEUROSURGERY | Age: 56
End: 2021-01-07
Payer: COMMERCIAL

## 2021-01-07 VITALS
TEMPERATURE: 98.4 F | HEIGHT: 78 IN | RESPIRATION RATE: 18 BRPM | SYSTOLIC BLOOD PRESSURE: 113 MMHG | OXYGEN SATURATION: 99 % | BODY MASS INDEX: 36.45 KG/M2 | DIASTOLIC BLOOD PRESSURE: 80 MMHG | HEART RATE: 95 BPM | WEIGHT: 315 LBS

## 2021-01-07 DIAGNOSIS — R20.0 NUMBNESS AND TINGLING OF BOTH FEET: ICD-10-CM

## 2021-01-07 DIAGNOSIS — G62.9 POLYNEUROPATHY: Primary | ICD-10-CM

## 2021-01-07 DIAGNOSIS — R20.2 NUMBNESS AND TINGLING OF BOTH FEET: ICD-10-CM

## 2021-01-07 PROCEDURE — 99213 OFFICE O/P EST LOW 20 MIN: CPT | Performed by: NURSE PRACTITIONER

## 2021-01-07 NOTE — PROGRESS NOTES
[facility]  [facility] NEUROSURGERY  [address], [place] [address]  Dept: [phone]    Patient:  Ramon Mitchell  YOB: 1965  Date: 11/12/20    The patient is a 54 y.o. male who presents today for consult of the following problems:     Chief Complaint   Patient presents with    Follow-up      emg results       HPI:     Ramon Mitchell is a 54 y.o. male who presents to the office for follow-up of bilateral foot numbness and tingling, status post EMG. Has had numbness and tingling for the last year. Does have some associated aching to feet after being on them for a long time. Does feel that symptoms are somewhat progressive. Has been evaluated by vascular surgery, as well as podiatry without any identifiable etiology. Denies history of diabetes. Denies any back pain, or previous back issues. Occasional burning sensation associated with numbness and tingling, but generally just numbness and tingling. Continues to try to exercise, cannot feel feet as they touch treadmill or with stair climbing. Here today to review EMG. Continues to deny low back pain or any radiation down either leg. Numbness and tingling typically starts around the lower one third of the calf extending down into the sole and lateral aspects of feet. History:     Past Medical History:   Diagnosis Date    Atrial fibrillation [facility])     [address]    Cancer [facility])     right arm, at site of right arm surgical scar    H/O upper respiratory infection     Hyperlipidemia     Kidney stones     ALLEY on CPAP     Umbilical hernia     Varicose vein of leg     Wellness examination     Dr. Jaden Najera.  [id number]     Past Surgical History:   Procedure Laterality Date    ARM SURGERY Right     x5, tendon injury    ARM SURGERY Left 01/26/2018    extensor tendon repair    CARDIOVERSION      x2, says likely to have another in february 2020    COLONOSCOPY      EYE MUSCLE SURGERY Left as a child   [address] N/A 12/30/2019    XI LAPAROSCOPIC ROBOTIC UMBILICAL HERNIA REPAIR WITH MESH performed by Carlito Bennett MD at [address] Right     ureter, born with 2 right kidneys    TONSILLECTOMY      UMBILICAL HERNIA REPAIR  12/30/2019    LAPAROSCOPIC ROBOTIC UMBILICAL HERNIA REPAIR WITH MESH     VASECTOMY       Family History   Problem Relation Age of Onset    Coronary Art Dis Mother     Stroke Mother     Cancer Brother         malignant melanoma of skin    Other Brother         seizures, bipolar, memory loss    Diabetes Maternal Uncle     Lung Cancer Maternal Grandfather     Coronary Art Dis Paternal Grandmother     Cancer Other         breast    Other Father         dementia    High Blood Pressure Sister     Asthma Sister     Arthritis Sister     High Blood Pressure Sister     Other Sister     Arthritis Sister      Current Outpatient Medications on File Prior to Visit   Medication Sig Dispense Refill    atorvastatin (LIPITOR) 20 MG tablet TAKE ONE TABLET BY MOUTH DAILY 30 tablet 5    allopurinol (ZYLOPRIM) 100 MG tablet TAKE ONE TABLET BY MOUTH DAILY 90 tablet 3    dilTIAZem (DILTIAZEM CD) 120 MG extended release capsule Take 120 mg by mouth daily      flecainide (TAMBOCOR) 100 MG tablet Take 100 mg by mouth 2 times daily      gabapentin (NEURONTIN) 100 MG capsule Take 100 mg by mouth 3 times daily.  apixaban (ELIQUIS) 5 MG TABS tablet Take 5 mg by mouth Stopping 12/27      gabapentin (NEURONTIN) 300 MG capsule Take 1 capsule by mouth 3 times daily for 30 days. 90 capsule 3     No current facility-administered medications on file prior to visit. Social History     Tobacco Use    Smoking status: Current Some Day Smoker     Packs/day: 0.00     Years: 10.00     Pack years: 0.00     Types: Cigars    Smokeless tobacco: Never Used    Tobacco comment: I have a few cigars per week   Substance Use Topics    Alcohol use:  Yes Alcohol/week: 4.0 standard drinks     Types: 2 Cans of beer, 2 Standard drinks or equivalent per week     Comment: occ    Drug use: No       No Known Allergies    Review of Systems  Constitutional: Negative for activity change and appetite change. HENT: Negative for ear pain and facial swelling. Eyes: Negative for discharge and itching. Respiratory: Negative for choking and chest tightness. Cardiovascular: Negative for chest pain and leg swelling. Gastrointestinal: Negative for nausea and abdominal pain. Endocrine: Negative for cold intolerance and heat intolerance. Genitourinary: Negative for frequency and flank pain. Musculoskeletal: Negative for myalgias and joint swelling. Skin: Negative for rash and wound. Allergic/Immunologic: Negative for environmental allergies and food allergies. Hematological: Negative for adenopathy. Does not bruise/bleed easily. Psychiatric/Behavioral: Negative for self-injury. The patient is not nervous/anxious. Physical Exam:      /80   Pulse 95   Temp 98.4 °F (36.9 °C)   Resp 18   Ht 6' 7\" (2.007 m)   Wt (!) 350 lb (158.8 kg)   SpO2 99%   BMI 39.43 kg/m²   Estimated body mass index is 39.43 kg/m² as calculated from the following:    Height as of this encounter: 6' 7\" (2.007 m). Weight as of this encounter: 350 lb (158.8 kg). General:  Ion Goldberg is a 54y.o. year old male who appears his stated age. HEENT: Normocephalic atraumatic. Neck supple. Chest: regular rate; pulses equal  Abdomen: Soft nontender nondistended.   Ext: DP and PT pulses 2+, good cap refill  Neuro    Mentation  Appropriate affect  Registration intact  Orientation intact  3 item recall intact  Judgement intact to situation    Cranial Nerves:   Pupils equal and reactive to light  Extraocular motion intact  Face and shrug symmetric  Tongue midline  No dysarthria  v1-3 sensation symmetric, masseter tone symmetric  Hearing symmetric    Sensation: Decreased from arches through all toes on both feet    Motor  L deltoid 5/5; R deltoid 5/5  L biceps 5/5; R biceps 5/5  L triceps 5/5; R triceps 5/5  L wrist extension 5/5; R wrist extension 5/5  L intrinsics 5/5; R intrinsics 5/5     L iliopsoas 5/5 , R iliopsoas 5/5  L quadriceps 5/5; R quadriceps 5/5  L Dorsiflexion 5/5; R dorsiflexion 5/5  L Plantarflexion 5/5; R plantarflexion 5/5  L EHL 5/5; R EHL 5/5    Reflexes  L Brachioradialis 2+/4; R brachioradialis 2+/4  L Biceps 2+/4; R Biceps 2+/4  L Triceps 2+/4; R Triceps 2+/4  L Patellar 2+/4: R Patellar 2+/4  L Achilles 2+/4; R Achilles 2+/4    hoffmans L: neg  hoffmans R: neg  Clonus L: neg  Clonus R: neg  Babinski L: neg  Babinski R: neg    Studies Review:     EMG 12/16/2020:  Reason for referral: Rule out neuropathy. Summary: The right median and the right ulnar motor nerves are normal.   Right peroneal motor nerve revealed decreased conduction velocity and   prolonged F-wave latency. Left peroneal motor nerve revealed markedly   diminished amplitudes on distal stimulation with prolonged distal latency   and absent F-wave response and no response was obtained on proximal   stimulation. Right tibial motor nerve revealed diminished amplitudes   decreased conduction velocity and prolonged F-wave latency. Left tibial   motor nerve revealed decreased conduction velocity prolonged F-wave   latency. Right median and right ulnar and right radial sensory nerves   revealed diminished amplitude on antidromic stimulation. Bilateral sural   sensory nerves revealed diminished amplitude. Needle EMG was performed of   selected muscles of the right lower extremity and was within normal.    Conclusion: This is an abnormal study. There is electrophysiologic   evidence for a sensorimotor neuropathy involving the extremities which is   affecting the legs more than the arms. Electrophysiologic findings in the   lower extremities indicate that the neuropathy is axonal in type.     Assessment and Plan:      1. Polyneuropathy    2. Numbness and tingling of both feet          Plan: Patient with persistent numbness to distal portion of bilateral feet for the last year. Has been progressive. Patient without history of diabetes. Denies any back pain, claudication symptoms. EMG findings reviewed with patient, recommend neurology referral for further evaluation, as no clear evidence of lumbar stenosis or radiculopathy. Advised to continue closely monitoring feet, avoid any barefoot walking, perform daily foot checks. Also encouraged to follow-up with cardiologist and vascular specialist as planned. Followup: Return if symptoms worsen or fail to improve. Prescriptions Ordered:  No orders of the defined types were placed in this encounter. Orders Placed:  Orders Placed This Encounter   Procedures   Johnnie Ferguson MD, Neurology, [place]     Referral Priority:   Routine     Referral Type:   Eval and Treat     Referral Reason:   Specialty Services Required     Referred to Provider:   Cynthia Clifton MD     Requested Specialty:   Neurology     Number of Visits Requested:   1        Electronically signed by MARIJA Valencia CNP on 1/7/2021 at 10:27 AM    Please note that this chart was generated using voice recognition Dragon dictation software. Although every effort was made to ensure the accuracy of this automated transcription, some errors in transcription may have occurred.

## 2021-01-20 ENCOUNTER — HOSPITAL ENCOUNTER (OUTPATIENT)
Age: 56
Discharge: HOME | End: 2021-01-20
Payer: COMMERCIAL

## 2021-01-20 LAB
ANION GAP SERPL CALCULATED.3IONS-SCNC: 9 MMOL/L (ref 9–17)
BUN BLDV-MCNC: 16 MG/DL (ref 6–20)
BUN/CREAT BLD: NORMAL (ref 9–20)
CALCIUM SERPL-MCNC: 9.2 MG/DL (ref 8.6–10.4)
CHLORIDE BLD-SCNC: 101 MMOL/L (ref 98–107)
CO2: 30 MMOL/L (ref 20–31)
CREAT SERPL-MCNC: 0.98 MG/DL (ref 0.7–1.2)
GFR AFRICAN AMERICAN: >60 ML/MIN
GFR NON-AFRICAN AMERICAN: >60 ML/MIN
GFR SERPL CREATININE-BSD FRML MDRD: NORMAL ML/MIN/{1.73_M2}
GFR SERPL CREATININE-BSD FRML MDRD: NORMAL ML/MIN/{1.73_M2}
GLUCOSE BLD-MCNC: 97 MG/DL (ref 70–99)
MAGNESIUM: 2.2 MG/DL (ref 1.6–2.6)
POTASSIUM SERPL-SCNC: 4.5 MMOL/L (ref 3.7–5.3)
SODIUM BLD-SCNC: 140 MMOL/L (ref 135–144)

## 2021-01-20 PROCEDURE — 80048 BASIC METABOLIC PNL TOTAL CA: CPT

## 2021-01-20 PROCEDURE — 83735 ASSAY OF MAGNESIUM: CPT

## 2021-01-20 PROCEDURE — 36415 COLL VENOUS BLD VENIPUNCTURE: CPT

## 2021-02-03 ENCOUNTER — HOSPITAL ENCOUNTER (OUTPATIENT)
Age: 56
Discharge: HOME | End: 2021-02-03
Payer: COMMERCIAL

## 2021-02-03 ENCOUNTER — OFFICE VISIT (OUTPATIENT)
Dept: NEUROLOGY | Age: 56
End: 2021-02-03
Payer: COMMERCIAL

## 2021-02-03 VITALS
DIASTOLIC BLOOD PRESSURE: 99 MMHG | HEIGHT: 78 IN | SYSTOLIC BLOOD PRESSURE: 155 MMHG | OXYGEN SATURATION: 97 % | WEIGHT: 315 LBS | HEART RATE: 79 BPM | BODY MASS INDEX: 36.45 KG/M2

## 2021-02-03 DIAGNOSIS — G62.9 SENSORY NEUROPATHY: Primary | ICD-10-CM

## 2021-02-03 DIAGNOSIS — G62.9 NEUROPATHY: ICD-10-CM

## 2021-02-03 LAB
FOLATE: 16.5 NG/ML
VITAMIN B-12: 748 PG/ML (ref 232–1245)

## 2021-02-03 PROCEDURE — 84166 PROTEIN E-PHORESIS/URINE/CSF: CPT

## 2021-02-03 PROCEDURE — 82607 VITAMIN B-12: CPT

## 2021-02-03 PROCEDURE — 84207 ASSAY OF VITAMIN B-6: CPT

## 2021-02-03 PROCEDURE — 36415 COLL VENOUS BLD VENIPUNCTURE: CPT

## 2021-02-03 PROCEDURE — 86038 ANTINUCLEAR ANTIBODIES: CPT

## 2021-02-03 PROCEDURE — 99204 OFFICE O/P NEW MOD 45 MIN: CPT | Performed by: STUDENT IN AN ORGANIZED HEALTH CARE EDUCATION/TRAINING PROGRAM

## 2021-02-03 PROCEDURE — 84155 ASSAY OF PROTEIN SERUM: CPT

## 2021-02-03 PROCEDURE — 84425 ASSAY OF VITAMIN B-1: CPT

## 2021-02-03 PROCEDURE — 82746 ASSAY OF FOLIC ACID SERUM: CPT

## 2021-02-03 PROCEDURE — 84156 ASSAY OF PROTEIN URINE: CPT

## 2021-02-03 PROCEDURE — 84165 PROTEIN E-PHORESIS SERUM: CPT

## 2021-02-03 ASSESSMENT — ENCOUNTER SYMPTOMS
SHORTNESS OF BREATH: 0
COUGH: 0
ABDOMINAL PAIN: 0

## 2021-02-03 NOTE — PROGRESS NOTES
MCV 88.7 06/25/2019     06/25/2019       Past Medical History:   Diagnosis Date    Atrial fibrillation [facility])     [facility]    Cancer [facility])     right arm, at site of right arm surgical scar    H/O upper respiratory infection     Hyperlipidemia     Kidney stones     ALLEY on CPAP     Umbilical hernia     Varicose vein of leg     Wellness examination     Dr. Iraida Tay. [id number]        Past Surgical History:   Procedure Laterality Date    ARM SURGERY Right     x5, tendon injury    ARM SURGERY Left 01/26/2018    extensor tendon repair    CARDIOVERSION      x2, says likely to have another in february 2020    COLONOSCOPY      EYE MUSCLE SURGERY Left as a child    HERNIA REPAIR N/A 12/30/2019    XI LAPAROSCOPIC ROBOTIC [facility] performed by Alfonso Esposito MD at [address] Right     ureter, born with 2 right kidneys    TONSILLECTOMY      UMBILICAL HERNIA REPAIR  12/30/2019    LAPAROSCOPIC ROBOTIC UMBILICAL HERNIA REPAIR WITH MESH     VASECTOMY          Social History     Socioeconomic History    Marital status:      Spouse name: Not on file    Number of children: Not on file    Years of education: Not on file    Highest education level: Not on file   Occupational History    Not on file   Social Needs    Financial resource strain: Not on file    Food insecurity     Worry: Not on file     Inability: Not on file   [employer] needs     Medical: Not on file     Non-medical: Not on file   Tobacco Use    Smoking status: Current Some Day Smoker     Packs/day: 0.00     Years: 10.00     Pack years: 0.00     Types: Cigars    Smokeless tobacco: Never Used    Tobacco comment: I have a few cigars per week   Substance and Sexual Activity    Alcohol use: Yes     Alcohol/week: 4.0 standard drinks     Types: 2 Cans of beer, 2 Standard drinks or equivalent per week     Comment: occ    Drug use:  No  Sexual activity: Yes     Partners: Female   Lifestyle    Physical activity     Days per week: Not on file     Minutes per session: Not on file    Stress: Not on file   Relationships    Social connections     Talks on phone: Not on file     Gets together: Not on file     Attends [employer] service: Not on file     Active member of club or organization: Not on file     Attends meetings of clubs or organizations: Not on file     Relationship status: Not on file    Intimate partner violence     Fear of current or ex partner: Not on file     Emotionally abused: Not on file     Physically abused: Not on file     Forced sexual activity: Not on file   Other Topics Concern    Not on file   Social History Narrative    Not on file        Current Outpatient Medications   Medication Sig Dispense Refill    gabapentin (NEURONTIN) 300 MG capsule Take 1 capsule by mouth 3 times daily for 30 days. 90 capsule 3    atorvastatin (LIPITOR) 20 MG tablet TAKE ONE TABLET BY MOUTH DAILY 30 tablet 5    allopurinol (ZYLOPRIM) 100 MG tablet TAKE ONE TABLET BY MOUTH DAILY 90 tablet 3    dilTIAZem (DILTIAZEM CD) 120 MG extended release capsule Take 120 mg by mouth daily      flecainide (TAMBOCOR) 100 MG tablet Take 100 mg by mouth 2 times daily      apixaban (ELIQUIS) 5 MG TABS tablet Take 5 mg by mouth Stopping 12/27       No current facility-administered medications for this visit. No Known Allergies     REVIEW OF SYSTEMS:     Review of Systems   Constitutional: Negative for fatigue and fever. HENT: Negative for drooling and ear discharge. Respiratory: Negative for cough and shortness of breath. Cardiovascular: Negative for chest pain. Gastrointestinal: Negative for abdominal pain. Endocrine: Negative for polyuria. Genitourinary: Negative for enuresis and flank pain. Musculoskeletal: Negative for gait problem. Neurological: Positive for numbness. Negative for dizziness, tremors, seizures, syncope, facial asymmetry, speech difficulty, weakness, light-headedness and headaches. Hematological: Negative for adenopathy. Psychiatric/Behavioral: Negative for agitation. VITALS  BP (!) 155/99   Pulse 79   Ht 6' 7\" (2.007 m)   Wt (!) 372 lb (168.7 kg)   SpO2 97%   BMI 41.91 kg/m²      PHYSICAL EXAMINATION:     Physical Exam   General appearance: cooperative  Skin: no rash or skin lesions.   HEENT: normocephalic  Optic Fundi: normal   Neck: supple, no cervcical adenopathy or carotid bruit  Lungs: no resp distress   Heart: Regular rate and rhythm,   Peripheral pulses: radial pulses palpable  Abdominal: BS present, soft, NT, ND  Extremities: no edema    NEUROLOGICAL EXAMINATION:     GENERAL  Appears comfortable and in no distress   HEENT  NC/ AT   HEART  S1 and S2 heard; palpation of pulses: radial pulse    NECK  Supple and no bruits heard   MENTAL STATUS:  Alert, oriented, intact memory, no confusion, normal speech, normal language, no hallucination or delusion   CRANIAL NERVES: II     -      Visual fields intact to confrontation  III,IV,VI -  PERR, EOMs full, no ptosis  V     -     Normal facial sensation   VII    -     Normal facial symmetry  VIII   -     Intact hearing   IX,X -     Symmetrical palate  XI    -     Symmetrical shoulder shrug  XII   -     Midline tongue, no atrophy    MOTOR FUNCTION: RUE: Significant for good strength of grade 5/5 in proximal and distal muscle groups   LUE: Significant for good strength of grade 5/5 in proximal and distal muscle groups   RLE: Significant for good strength of grade 5/5 in proximal and distal muscle groups   LLE: Significant for good strength of grade 5/5 in proximal and distal muscle groups      Normal bulk, normal tone and no involuntary movements, no tremor   SENSORY FUNCTION:  Normal touch, normal pin, normal vibration, normal proprioception

## 2021-02-04 LAB
ALBUMIN (CALCULATED): 4.7 G/DL (ref 3.2–5.2)
ALBUMIN PERCENT: 63 % (ref 45–65)
ALPHA 1 PERCENT: 2 % (ref 3–6)
ALPHA 2 PERCENT: 11 % (ref 6–13)
ALPHA-1-GLOBULIN: 0.2 G/DL (ref 0.1–0.4)
ALPHA-2-GLOBULIN: 0.8 G/DL (ref 0.5–0.9)
ANTI-NUCLEAR ANTIBODY (ANA): NEGATIVE
BETA GLOBULIN: 0.8 G/DL (ref 0.5–1.1)
BETA PERCENT: 11 % (ref 11–19)
GAMMA GLOBULIN %: 13 % (ref 9–20)
GAMMA GLOBULIN: 1 G/DL (ref 0.5–1.5)
P E INTERPRETATION, U: NORMAL
PATHOLOGIST: ABNORMAL
PATHOLOGIST: NORMAL
PROTEIN ELECTROPHORESIS, SERUM: ABNORMAL
SPECIMEN TYPE: NORMAL
TOTAL PROT. SUM,%: 100 % (ref 98–102)
TOTAL PROT. SUM: 7.5 G/DL (ref 6.3–8.2)
TOTAL PROTEIN: 7.5 G/DL (ref 6.4–8.3)
URINE TOTAL PROTEIN: 14 MG/DL

## 2021-02-06 LAB — VITAMIN B1 WHOLE BLOOD: 118 NMOL/L (ref 70–180)

## 2021-02-07 LAB — VITAMIN B6: 103.8 NMOL/L (ref 20–125)

## 2021-02-08 ENCOUNTER — HOSPITAL ENCOUNTER (OUTPATIENT)
Age: 56
Discharge: HOME | End: 2021-02-08
Payer: COMMERCIAL

## 2021-02-08 DIAGNOSIS — G62.9 SENSORY NEUROPATHY: ICD-10-CM

## 2021-02-08 LAB
ESTIMATED AVERAGE GLUCOSE: 103 MG/DL
HBA1C MFR BLD: 5.2 % (ref 4–6)

## 2021-02-08 PROCEDURE — 83036 HEMOGLOBIN GLYCOSYLATED A1C: CPT

## 2021-02-08 PROCEDURE — 36415 COLL VENOUS BLD VENIPUNCTURE: CPT

## 2021-02-21 DIAGNOSIS — M79.671 PAIN IN RIGHT FOOT: ICD-10-CM

## 2021-02-21 DIAGNOSIS — M79.672 PAIN IN LEFT FOOT: ICD-10-CM

## 2021-02-21 DIAGNOSIS — G60.9 IDIOPATHIC NEUROPATHY: ICD-10-CM

## 2021-02-22 ENCOUNTER — HOSPITAL ENCOUNTER (OUTPATIENT)
Age: 56
Discharge: HOME | End: 2021-02-22
Payer: COMMERCIAL

## 2021-02-22 ENCOUNTER — OFFICE VISIT (OUTPATIENT)
Dept: NEUROLOGY | Age: 56
End: 2021-02-22
Payer: COMMERCIAL

## 2021-02-22 VITALS
WEIGHT: 315 LBS | BODY MASS INDEX: 39.43 KG/M2 | HEART RATE: 74 BPM | SYSTOLIC BLOOD PRESSURE: 152 MMHG | DIASTOLIC BLOOD PRESSURE: 90 MMHG

## 2021-02-22 DIAGNOSIS — G62.9 SENSORY NEUROPATHY: ICD-10-CM

## 2021-02-22 DIAGNOSIS — G62.9 SENSORY NEUROPATHY: Primary | ICD-10-CM

## 2021-02-22 DIAGNOSIS — I48.91 ATRIAL FIBRILLATION, UNSPECIFIED TYPE (HCC): ICD-10-CM

## 2021-02-22 DIAGNOSIS — E66.9 OBESITY (BMI 35.0-39.9 WITHOUT COMORBIDITY): ICD-10-CM

## 2021-02-22 PROCEDURE — 83655 ASSAY OF LEAD: CPT

## 2021-02-22 PROCEDURE — 82300 ASSAY OF CADMIUM: CPT

## 2021-02-22 PROCEDURE — 36415 COLL VENOUS BLD VENIPUNCTURE: CPT

## 2021-02-22 PROCEDURE — 82175 ASSAY OF ARSENIC: CPT

## 2021-02-22 PROCEDURE — 99215 OFFICE O/P EST HI 40 MIN: CPT | Performed by: STUDENT IN AN ORGANIZED HEALTH CARE EDUCATION/TRAINING PROGRAM

## 2021-02-22 PROCEDURE — 83825 ASSAY OF MERCURY: CPT

## 2021-02-22 RX ORDER — TOPIRAMATE 25 MG/1
TABLET ORAL
Qty: 120 TABLET | Refills: 2 | Status: SHIPPED | OUTPATIENT
Start: 2021-02-22 | End: 2021-05-24 | Stop reason: ALTCHOICE

## 2021-02-22 RX ORDER — GABAPENTIN 300 MG/1
CAPSULE ORAL
Qty: 90 CAPSULE | Refills: 2 | Status: SHIPPED | OUTPATIENT
Start: 2021-02-22 | End: 2021-05-24 | Stop reason: ALTCHOICE

## 2021-02-22 RX ORDER — TRIAMTERENE AND HYDROCHLOROTHIAZIDE 75; 50 MG/1; MG/1
1 TABLET ORAL DAILY
COMMUNITY
Start: 2021-01-20

## 2021-02-22 ASSESSMENT — ENCOUNTER SYMPTOMS
ABDOMINAL PAIN: 0
SHORTNESS OF BREATH: 0
COUGH: 0

## 2021-02-22 NOTE — TELEPHONE ENCOUNTER
Please Approve or Refuse.        Next Visit Date:  Visit date not found   Last Visit Date: 10/27/2020    Hemoglobin A1C (%)   Date Value   02/08/2021 5.2             ( goal A1C is < 7)   BP Readings from Last 3 Encounters:   02/03/21 (!) 155/99   01/07/21 113/80   11/12/20 (!) 200/136          (goal 120/80)  BUN   Date Value Ref Range Status   01/20/2021 16 6 - 20 mg/dL Final     CREATININE   Date Value Ref Range Status   01/20/2021 0.98 0.70 - 1.20 mg/dL Final     Potassium   Date Value Ref Range Status   01/20/2021 4.5 3.7 - 5.3 mmol/L Final

## 2021-02-22 NOTE — PROGRESS NOTES
[place] [address] [facility]  Dept: [phone]  Dept Fax: [phone]    NEUROLOGY NEW PATIENT NOTE       PATIENT NAME: Demaris Gowers  PATIENT MRN: [id number]  PRIMARY CARE PHYSICIAN: Toney Garcia MD      HPI:        Interval hx 2/22/2021  The patient was seen and examined at bedside, he is vitally stable alert oriented x4. The patient stated that he still complaining of tingling in the distal upper and lower extremities. Peripheral neuropathy work-up came back unremarkable. The patient gave history of exposure to heavy metals in the past.  We will start Topamax trial along with lidocaine cream, will send heavy metal blood panel. We will see the patient in 3 months. HPI  Demaris Gowers is a 54 y.o. male with past medical history of hypertension, hypercholesterolemia, obstructive sleep apnea on CPAP, atrial fibrillation on diltiazem and Eliquis, varicose veins of the lower extremities on compression stockings, ruptured biceps tendon in the right side s/p repair in 2010, was referred from his PCP for tingling and numbness involving the distal upper and lower extremities. The patient stated that 1 and half year ago, he started to feel numbness in the lateral aspect of both feet along with the sole. It was intermittent without pain. In addition to that, he started to feel numbness in the tips of the little and ring finger bilaterally. He denied smoking or drinking alcohol or using recreational drugs. He denied weakness, history of stroke or heart attack, dizziness or headaches, change in speech, change in bowel or urinary habits, fever or chills, chest or abdominal pain. The patient was referred for EMG study in December 2020 which revealed evidence of sensorimotor neuropathy involving the extremities which is affecting the legs more than the arms, neuropathy is axonal in type.   Basic blood work-up in January 2021 showed normal CBC and BMP. Free T4 in 2/2018 was 0.92, TSH was within normal limits. Physical exam was unremarkable except for decreased all modalities of sensation in the distal extremities include fingers in the little and ring finger distribution bilaterally and lower extremities in the tip of toes and lateral aspect of both feet. PREVIOUS WORKUP:     Lab Results   Component Value Date    WBC 8.7 06/25/2019    HGB 15.3 06/25/2019    HCT 45.7 06/25/2019    MCV 88.7 06/25/2019     06/25/2019       Past Medical History:   Diagnosis Date    Atrial fibrillation [facility])     [facility]    Cancer [facility])     right arm, at site of right arm surgical scar    H/O upper respiratory infection     Hyperlipidemia     Kidney stones     ALLEY on CPAP     Umbilical hernia     Varicose vein of leg     Wellness examination     Dr. Ministerio Dozier.  [id number]        Past Surgical History:   Procedure Laterality Date    ARM SURGERY Right     x5, tendon injury    ARM SURGERY Left 01/26/2018    extensor tendon repair    CARDIOVERSION      x2, says likely to have another in february 2020    COLONOSCOPY      EYE MUSCLE SURGERY Left as a child    HERNIA REPAIR N/A 12/30/2019    XI LAPAROSCOPIC ROBOTIC [address] [place] [address] performed by Joe Mari MD at [address] [place] [address] Rd Right     ureter, born with 2 right kidneys    TONSILLECTOMY      UMBILICAL HERNIA REPAIR  12/30/2019    LAPAROSCOPIC ROBOTIC UMBILICAL HERNIA REPAIR WITH MESH     VASECTOMY          Social History     Socioeconomic History    Marital status:      Spouse name: Not on file    Number of children: Not on file    Years of education: Not on file    Highest education level: Not on file   Occupational History    Not on file   Social Needs    Financial resource strain: Not on file    Food insecurity     Worry: Not on file     Inability: Not on file   [employer] needs Medical: Not on file     Non-medical: Not on file   Tobacco Use    Smoking status: Current Some Day Smoker     Packs/day: 0.00     Years: 10.00     Pack years: 0.00     Types: Cigars    Smokeless tobacco: Never Used    Tobacco comment: I have a few cigars per week   Substance and Sexual Activity    Alcohol use: Yes     Alcohol/week: 4.0 standard drinks     Types: 2 Cans of beer, 2 Standard drinks or equivalent per week     Comment: occ    Drug use: No    Sexual activity: Yes     Partners: Female   Lifestyle    Physical activity     Days per week: Not on file     Minutes per session: Not on file    Stress: Not on file   Relationships    Social connections     Talks on phone: Not on file     Gets together: Not on file     Attends Amish service: Not on file     Active member of club or organization: Not on file     Attends meetings of clubs or organizations: Not on file     Relationship status: Not on file    Intimate partner violence     Fear of current or ex partner: Not on file     Emotionally abused: Not on file     Physically abused: Not on file     Forced sexual activity: Not on file   Other Topics Concern    Not on file   Social History Narrative    Not on file        Current Outpatient Medications   Medication Sig Dispense Refill    gabapentin (NEURONTIN) 300 MG capsule TAKE ONE CAPSULE BY MOUTH THREE TIMES A DAY 90 capsule 2    triamterene-hydroCHLOROthiazide (MAXZIDE) 75-50 MG per tablet Take 1 tablet by mouth daily      atorvastatin (LIPITOR) 20 MG tablet TAKE ONE TABLET BY MOUTH DAILY 30 tablet 5    allopurinol (ZYLOPRIM) 100 MG tablet TAKE ONE TABLET BY MOUTH DAILY 90 tablet 3    dilTIAZem (DILTIAZEM CD) 120 MG extended release capsule Take 120 mg by mouth daily      flecainide (TAMBOCOR) 100 MG tablet Take 100 mg by mouth 2 times daily      apixaban (ELIQUIS) 5 MG TABS tablet Take 5 mg by mouth Stopping 12/27       No current facility-administered medications for this visit. No Known Allergies     REVIEW OF SYSTEMS:     Review of Systems   Constitutional: Negative for fatigue and fever. HENT: Negative for drooling and ear discharge. Respiratory: Negative for cough and shortness of breath. Cardiovascular: Negative for chest pain. Gastrointestinal: Negative for abdominal pain. Endocrine: Negative for polyuria. Genitourinary: Negative for enuresis and flank pain. Musculoskeletal: Negative for gait problem. Neurological: Positive for numbness. Negative for dizziness, tremors, seizures, syncope, facial asymmetry, speech difficulty, weakness, light-headedness and headaches. Hematological: Negative for adenopathy. Psychiatric/Behavioral: Negative for agitation. VITALS  BP (!) 152/90 (Site: Left Upper Arm, Position: Sitting, Cuff Size: Medium Adult)   Pulse 74   Wt (!) 350 lb (158.8 kg) Comment: verbal  BMI 39.43 kg/m²      PHYSICAL EXAMINATION:     Physical Exam   General appearance: cooperative  Skin: no rash or skin lesions.   HEENT: normocephalic  Optic Fundi: normal   Neck: supple, no cervcical adenopathy or carotid bruit  Lungs: no resp distress   Heart: Regular rate and rhythm,   Peripheral pulses: radial pulses palpable  Abdominal: BS present, soft, NT, ND  Extremities: no edema    NEUROLOGICAL EXAMINATION:     GENERAL  Appears comfortable and in no distress   HEENT  NC/ AT   HEART  S1 and S2 heard; palpation of pulses: radial pulse    NECK  Supple and no bruits heard   MENTAL STATUS:  Alert, oriented, intact memory, no confusion, normal speech, normal language, no hallucination or delusion   CRANIAL NERVES: II     -      Visual fields intact to confrontation  III,IV,VI -  PERR, EOMs full, no ptosis  V     -     Normal facial sensation   VII    -     Normal facial symmetry  VIII   -     Intact hearing   IX,X -     Symmetrical palate  XI    -     Symmetrical shoulder shrug  XII   -     Midline tongue, no atrophy    MOTOR FUNCTION: RUE: Significant for good strength of grade 5/5 in proximal and distal muscle groups   LUE: Significant for good strength of grade 5/5 in proximal and distal muscle groups   RLE: Significant for good strength of grade 5/5 in proximal and distal muscle groups   LLE: Significant for good strength of grade 5/5 in proximal and distal muscle groups      Normal bulk, normal tone and no involuntary movements, no tremor   SENSORY FUNCTION:  decr sensation to touch, pin, vibration, proprioception   CEREBELLAR FUNCTION:  Intact fine motor control over upper limbs and lower limbs   REFLEX FUNCTION:  Symmetric hyporeflexia in upper and lower extremities, no Babinski sign   STATION and GAIT  Normal gait and tandem station, normal tip toes and heel walking       ASSESSMENT:     Kale Rob is a 54 y.o. male with past medical history of hypertension, hypercholesterolemia, obstructive sleep apnea on CPAP, atrial fibrillation on diltiazem and Eliquis, varicose veins of the lower extremities on compression stockings, ruptured biceps tendon in the right side s/p repair in 2010, was referred from his PCP for tingling and numbness involving the distal upper and lower extremities. Peripheral neuropathy. PLAN:     -HbA1c, TSH, SPEP and UPEP, Vit B12, B6, B1, folate >> unremarkable   -ELIAN is negative  -Start Lidocaine cream locally. -F/u heavy metals panel.  -Topamax until reach 50 mg BID. Discussed with the pt about the side effects and the need to drink a lot of water with it.   -No images of the spine required for now. -RTC in 2 months    Mr. Sanches received counseling on the following healthy behaviors: medical compliance, smoking cessation, blood pressure control, regular follow up with primary doctor.         Electronically signed by Lela Carpio MD on 2/22/2021 at 1:37 PM

## 2021-02-27 LAB
ARSENIC, BLOOD: <10 UG/L
CADMIUM: <1 UG/L
LEAD BLOOD: 2 UG/DL (ref 0–4)
MERCURY, BLOOD: <2.5 UG/L

## 2021-05-24 ENCOUNTER — OFFICE VISIT (OUTPATIENT)
Dept: NEUROLOGY | Age: 56
End: 2021-05-24
Payer: COMMERCIAL

## 2021-05-24 VITALS
DIASTOLIC BLOOD PRESSURE: 82 MMHG | BODY MASS INDEX: 36.45 KG/M2 | OXYGEN SATURATION: 97 % | HEART RATE: 78 BPM | HEIGHT: 78 IN | WEIGHT: 315 LBS | SYSTOLIC BLOOD PRESSURE: 415 MMHG

## 2021-05-24 DIAGNOSIS — R20.2 PARESTHESIA: Primary | ICD-10-CM

## 2021-05-24 PROCEDURE — 99203 OFFICE O/P NEW LOW 30 MIN: CPT | Performed by: STUDENT IN AN ORGANIZED HEALTH CARE EDUCATION/TRAINING PROGRAM

## 2021-05-24 RX ORDER — DULOXETIN HYDROCHLORIDE 30 MG/1
30 CAPSULE, DELAYED RELEASE ORAL 2 TIMES DAILY
Qty: 30 CAPSULE | Refills: 2 | Status: SHIPPED | OUTPATIENT
Start: 2021-05-24 | End: 2021-07-13

## 2021-05-24 ASSESSMENT — ENCOUNTER SYMPTOMS
ABDOMINAL PAIN: 0
COUGH: 0
SHORTNESS OF BREATH: 0

## 2021-05-24 NOTE — PATIENT INSTRUCTIONS
Schedule a Vaccine  When you qualify to receive the vaccine, call the [facility]) COVID-19 Vaccination Hotline to schedule your appointment or to get additional information about the [facility]) locations which are offering the COVID-19 vaccine. To be 94% effective, it's important that you receive two doses of one of the COVID-19 vaccines. -If you are receiving the Allan Peter vaccine, your second shot will be scheduled as close to 21 days after the first shot as possible. -If you are receiving the Moderna vaccine, your second shot will be scheduled as close to 28 days after the first shot as possible. [facility]) COVID-19 Vaccination Hotline: [phone]    Links to [facility]) website and [facility] website:    [email or web address]    [email or web address]

## 2021-05-24 NOTE — PROGRESS NOTES
[place] [address] [facility]  Dept: [phone]  Dept Fax: [phone]    NEUROLOGY NEW PATIENT NOTE       PATIENT NAME: Keaton Ribeiro  PATIENT MRN: [id number]  PRIMARY CARE PHYSICIAN: Mikayla Duong MD      HPI:      Interval history 5/24/2021  The patient was seen and examined at bedside, he is vitally stable alert oriented x4. Systolic pressure was around 164, the patient was advised to follow-up with PCP. The patient stated that Topamax made him to have a lot of confusion. He stopped taking that 3 weeks ago. Lidocaine cream was not helpful. Heavy metals labs were unremarkable. We will start the patient on Cymbalta 30 mg twice daily. We will consider faxing the EMG results from [address] [place] [address],[address] for possible hereditary sensorimotor neuropathy. To follow-up with neurology clinic in 2 months. Interval hx 2/22/2021  The patient was seen and examined at bedside, he is vitally stable alert oriented x4. The patient stated that he still complaining of tingling in the distal upper and lower extremities. Peripheral neuropathy work-up came back unremarkable. The patient gave history of exposure to heavy metals in the past.  We will start Topamax trial along with lidocaine cream, will send heavy metal blood panel. We will see the patient in 3 months. HPI  Keaton Ribeiro is a 54 y.o. male with past medical history of hypertension, hypercholesterolemia, obstructive sleep apnea on CPAP, atrial fibrillation on diltiazem and Eliquis, varicose veins of the lower extremities on compression stockings, ruptured biceps tendon in the right side s/p repair in 2010, was referred from his PCP for tingling and numbness involving the distal upper and lower extremities. The patient stated that 1 and half year ago, he started to feel numbness in the lateral aspect of both feet along with the sole. It was intermittent without pain. In addition to that, he started to feel numbness in the tips of the little and ring finger bilaterally. He denied smoking or drinking alcohol or using recreational drugs. He denied weakness, history of stroke or heart attack, dizziness or headaches, change in speech, change in bowel or urinary habits, fever or chills, chest or abdominal pain. The patient was referred for EMG study in December 2020 which revealed evidence of sensorimotor neuropathy involving the extremities which is affecting the legs more than the arms, neuropathy is axonal in type. Basic blood work-up in January 2021 showed normal CBC and BMP. Free T4 in 2/2018 was 0.92, TSH was within normal limits. Physical exam was unremarkable except for decreased all modalities of sensation in the distal extremities include fingers in the little and ring finger distribution bilaterally and lower extremities in the tip of toes and lateral aspect of both feet. PREVIOUS WORKUP:     Lab Results   Component Value Date    WBC 8.7 06/25/2019    HGB 15.3 06/25/2019    HCT 45.7 06/25/2019    MCV 88.7 06/25/2019     06/25/2019       Past Medical History:   Diagnosis Date    Atrial fibrillation [facility])     [facility]    Cancer [facility])     right arm, at site of right arm surgical scar    H/O upper respiratory infection     Hyperlipidemia     Kidney stones     ALLEY on CPAP     Umbilical hernia     Varicose vein of leg     Wellness examination     Dr. Suresh Self.  [id number]        Past Surgical History:   Procedure Laterality Date    ARM SURGERY Right     x5, tendon injury    ARM SURGERY Left 01/26/2018    extensor tendon repair    CARDIOVERSION      x2, says likely to have another in february 2020    COLONOSCOPY      EYE MUSCLE SURGERY Left as a child    HERNIA REPAIR N/A 12/30/2019    XI LAPAROSCOPIC ROBOTIC [address] [facility] performed by Johnny Carballo MD at [address] [facility] SURGERY Right     ureter, born with 2 right kidneys    TONSILLECTOMY      UMBILICAL HERNIA REPAIR  12/30/2019    LAPAROSCOPIC ROBOTIC UMBILICAL HERNIA REPAIR WITH MESH     VASECTOMY          Social History     Socioeconomic History    Marital status:      Spouse name: Not on file    Number of children: Not on file    Years of education: Not on file    Highest education level: Not on file   Occupational History    Not on file   Tobacco Use    Smoking status: Current Some Day Smoker     Packs/day: 0.00     Years: 10.00     Pack years: 0.00     Types: Cigars    Smokeless tobacco: Never Used    Tobacco comment: I have a few cigars per week   Vaping Use    Vaping Use: Never used   Substance and Sexual Activity    Alcohol use: Yes     Alcohol/week: 4.0 standard drinks     Types: 2 Cans of beer, 2 Standard drinks or equivalent per week     Comment: occ    Drug use: No    Sexual activity: Yes     Partners: Female   Other Topics Concern    Not on file   Social History Narrative    Not on file     Social Determinants of Health     Financial Resource Strain:     Difficulty of Paying Living Expenses:    Food Insecurity:     Worried About Running Out of Food in the Last Year:     Ran Out of Food in the Last Year:    Transportation Needs:     Lack of Transportation (Medical):      Lack of Transportation (Non-Medical):    Physical Activity:     Days of Exercise per Week:     Minutes of Exercise per Session:    Stress:     Feeling of Stress :    Social Connections:     Frequency of Communication with Friends and Family:     Frequency of Social Gatherings with Friends and Family:     Attends Islam Services:     Active Member of Clubs or Organizations:     Attends Club or Organization Meetings:     Marital Status:    Intimate Partner Violence:     Fear of Current or Ex-Partner:     Emotionally Abused:     Physically Abused:     Sexually Abused:         Current Outpatient Medications Medication Sig Dispense Refill    gabapentin (NEURONTIN) 300 MG capsule TAKE ONE CAPSULE BY MOUTH THREE TIMES A DAY 90 capsule 2    triamterene-hydroCHLOROthiazide (MAXZIDE) 75-50 MG per tablet Take 1 tablet by mouth daily      topiramate (TOPAMAX) 25 MG tablet Use one tab daily for one week 1   then one tab in am and one tab in pm for week 2   then 2 tabs in am and one tab in pm for week 3   then 2 tabs in am and 2 tabs in pm for week 4. 120 tablet 2    Lidocaine 4 % GEL Apply 1 Dose topically 2 times daily 1 Bottle 1    atorvastatin (LIPITOR) 20 MG tablet TAKE ONE TABLET BY MOUTH DAILY 30 tablet 5    allopurinol (ZYLOPRIM) 100 MG tablet TAKE ONE TABLET BY MOUTH DAILY 90 tablet 3    dilTIAZem (DILTIAZEM CD) 120 MG extended release capsule Take 120 mg by mouth daily      flecainide (TAMBOCOR) 100 MG tablet Take 100 mg by mouth 2 times daily      apixaban (ELIQUIS) 5 MG TABS tablet Take 5 mg by mouth Stopping 12/27       No current facility-administered medications for this visit. No Known Allergies     REVIEW OF SYSTEMS:     Review of Systems   Constitutional: Negative for fatigue and fever. HENT: Negative for drooling and ear discharge. Respiratory: Negative for cough and shortness of breath. Cardiovascular: Negative for chest pain. Gastrointestinal: Negative for abdominal pain. Endocrine: Negative for polyuria. Genitourinary: Negative for enuresis and flank pain. Musculoskeletal: Negative for gait problem. Neurological: Positive for numbness. Negative for dizziness, tremors, seizures, syncope, facial asymmetry, speech difficulty, weakness, light-headedness and headaches. Hematological: Negative for adenopathy. Psychiatric/Behavioral: Negative for agitation. VITALS  There were no vitals taken for this visit. PHYSICAL EXAMINATION:     Physical Exam   General appearance: cooperative  Skin: no rash or skin lesions.   HEENT: normocephalic  Optic Fundi: normal   Neck: supple, no cervcical adenopathy or carotid bruit  Lungs: no resp distress   Heart: Regular rate and rhythm,   Peripheral pulses: radial pulses palpable  Abdominal: BS present, soft, NT, ND  Extremities: no edema    NEUROLOGICAL EXAMINATION:     GENERAL  Appears comfortable and in no distress   HEENT  NC/ AT   HEART  S1 and S2 heard; palpation of pulses: radial pulse    NECK  Supple and no bruits heard   MENTAL STATUS:  Alert, oriented, intact memory, no confusion, normal speech, normal language, no hallucination or delusion   CRANIAL NERVES: II     -      Visual fields intact to confrontation  III,IV,VI -  PERR, EOMs full, no ptosis  V     -     Normal facial sensation   VII    -     Normal facial symmetry  VIII   -     Intact hearing   IX,X -     Symmetrical palate  XI    -     Symmetrical shoulder shrug  XII   -     Midline tongue, no atrophy    MOTOR FUNCTION: RUE: Significant for good strength of grade 5/5 in proximal and distal muscle groups   LUE: Significant for good strength of grade 5/5 in proximal and distal muscle groups   RLE: Significant for good strength of grade 5/5 in proximal and distal muscle groups   LLE: Significant for good strength of grade 5/5 in proximal and distal muscle groups      Normal bulk, normal tone and no involuntary movements, no tremor   SENSORY FUNCTION:  decr sensation to touch, pin, vibration, proprioception   CEREBELLAR FUNCTION:  Intact fine motor control over upper limbs and lower limbs   REFLEX FUNCTION:  Symmetric hyporeflexia in upper and lower extremities, no Babinski sign   STATION and GAIT  Normal gait and tandem station, normal tip toes and heel walking       ASSESSMENT:     Rosa Lazcano is a 54 y.o. male with past medical history of hypertension, hypercholesterolemia, obstructive sleep apnea on CPAP, atrial fibrillation on diltiazem and Eliquis, varicose veins of the lower extremities on compression stockings, ruptured biceps tendon in the right side s/p repair in 2010, was referred from his PCP for tingling and numbness involving the distal upper and lower extremities. Peripheral neuropathy. PLAN:     -HbA1c, TSH, SPEP and UPEP, Vit B12, B6, B1, folate >> unremarkable   -ELIAN is negative  -Start Lidocaine cream locally.   -Heavy metals panel was unremarkable  -Stop Topamax due to confusion side effect, stop gabapentin and lidocaine cream.  Start on Cymbalta 30 mg twice daily.  -Consider faxing the EMG results from [address] [facility] for possible hereditary sensorimotor neuropathy.  -No images of the spine required for now. -RTC in 2 months. Mr. Taylor Pollock received counseling on the following healthy behaviors: medical compliance, smoking cessation, blood pressure control, regular follow up with primary doctor.         Electronically signed by Sebastian Benjamin MD on 5/24/2021 at 1:05 PM

## 2021-06-14 RX ORDER — ALLOPURINOL 100 MG/1
TABLET ORAL
Qty: 90 TABLET | Refills: 2 | OUTPATIENT
Start: 2021-06-14

## 2021-06-15 NOTE — TELEPHONE ENCOUNTER
Patient was informed that he needs to get an appt. Patient stated that he can't schedule right now but will call back.

## 2021-08-02 ENCOUNTER — OFFICE VISIT (OUTPATIENT)
Dept: NEUROLOGY | Age: 56
End: 2021-08-02
Payer: COMMERCIAL

## 2021-08-02 VITALS
HEIGHT: 78 IN | HEART RATE: 80 BPM | SYSTOLIC BLOOD PRESSURE: 136 MMHG | BODY MASS INDEX: 36.45 KG/M2 | OXYGEN SATURATION: 100 % | WEIGHT: 315 LBS | RESPIRATION RATE: 14 BRPM | DIASTOLIC BLOOD PRESSURE: 87 MMHG

## 2021-08-02 DIAGNOSIS — R20.2 PARESTHESIA: Primary | ICD-10-CM

## 2021-08-02 PROCEDURE — 99214 OFFICE O/P EST MOD 30 MIN: CPT | Performed by: STUDENT IN AN ORGANIZED HEALTH CARE EDUCATION/TRAINING PROGRAM

## 2021-08-02 RX ORDER — PREGABALIN 25 MG/1
CAPSULE ORAL
Qty: 270 CAPSULE | Refills: 0 | Status: SHIPPED | OUTPATIENT
Start: 2021-08-02 | End: 2021-08-16 | Stop reason: ALTCHOICE

## 2021-08-02 ASSESSMENT — ENCOUNTER SYMPTOMS
ABDOMINAL PAIN: 0
SHORTNESS OF BREATH: 0
COUGH: 0

## 2021-08-02 NOTE — PROGRESS NOTES
Attending Physician Statement:    I have discussed the case of Anita Anand, including pertinent history and exam findings with the resident. I have seen and examined the patient and the key elements of the encounter have been performed by me. I have reviewed medications, clinical laboratory, imaging and other diagnostic tests with the residents. I agree with the assessment, plan and orders as documented by the resident with changes made to the note as needed. Mr. Anita Anand is a 54 y.o. male was seen peripheral neuropathy    Patient was last seen in the clinic on 5/24/2021. patient had EMG nerve conduction study done  outside [facility] which showed moderate symmetrical axonal/demyelinating changes in bilateral lower extremity    Since last clinic visit patient denies any improvement in the symptoms, continues to have tingling numbness in his feet and bilateral upper extremities mainly involving the fingertips. Denies any improvement in the symptoms with Cymbalta, endorses mental fogginess and has stopped taking it after a week. Denies any other new neurologic concerns during this visit. Impression and Plan:     Patient presented with bilateral lower extremity pain and paresthesias in bilateral upper and lower extremities, only involving fingertips and bilateral upper extremities and from the below bilaterally. EMG nerve conduction study concerning for axonal/demyelinating changes in bilateral lower extremities. History of A. fib on Eliquis  Hypertension  Hyperlipidemia  History of chemical exposure in the past, he used to work in the [employer]. History of varicose veins of bilateral lower extremities. History of ruptured bicep tendon and on the right side s/p repair in 2010        HbA1c 5.2  Vitamin B1 118  Vitamin B6 103.8  Vitamin B12 148  Folate 16.5    Plan  -May consider trial of Lyrica, 25 mg 3 times daily, gradual dose titration as per patient's response 200 mg 3 times a day. Discussed possible side effects of sedation and drowsiness with the patient. He voiced understanding.    -May consider skin punch biopsy. Will refer to Dr. Katy Capellan for that. -Will check serum copper and serum plasma level.    -Endorses fogginess with Cymbalta. -Patient had tried gabapentin in the past, denies any improvement in the symptoms with gabapentin and hence it was gradually weaned off.  -Patient was given Topamax in the past, had confusion with it and hence it was discontinued.  -Also tried lidocaine cream, denies improvement in the symptoms with that and hence was discontinued.    -We will give a trial with physical therapy. - Follow up in the clinic with the resident in 2 to 3 months. - Instructed patient to call the clinic if symptoms worsen or develop any new symptoms. This note is created with the assistance of a speech recognition program.  While intending to generate a document that actually reflects the content of the visit, the document can still have some errors including those of syntax and sound a like substitutions which may escape proof reading. In such instances, actual meaning can be extrapolated by contextual diversion.     Katina San MD 8/2/2021 1:35 PM    Neurology

## 2021-08-02 NOTE — PROGRESS NOTES
[address] [place] [address] [address] [facility]  Dept: [phone]  Dept Fax: [phone]    NEUROLOGY NEW PATIENT NOTE       PATIENT NAME: Brayan Gonzalez  PATIENT MRN: [id number]  PRIMARY CARE PHYSICIAN: Serge Delatorre MD      HPI:      Interval history 08/02/2021  the patient stated that Cymbalta gave him side effects of fogginess. He stopped that after 1 week of taking it. We will start Lyrica and will increase the dose accordingly. We will refer the patient for skin punch biopsy. We will send CU level and Ceruloplasmin level. Will refer for physical therapy. Interval history 5/24/2021  The patient was seen and examined at bedside, he is vitally stable alert oriented x4. Systolic pressure was around 164, the patient was advised to follow-up with PCP. The patient stated that Topamax made him to have a lot of confusion. He stopped taking that 3 weeks ago. Lidocaine cream was not helpful. Heavy metals labs were unremarkable. We will start the patient on Cymbalta 30 mg twice daily. We will consider faxing the EMG results from [address] [place] [address],[address] for possible hereditary sensorimotor neuropathy. To follow-up with neurology clinic in 2 months. Interval hx 2/22/2021  The patient was seen and examined at bedside, he is vitally stable alert oriented x4. The patient stated that he still complaining of tingling in the distal upper and lower extremities. Peripheral neuropathy work-up came back unremarkable. The patient gave history of exposure to heavy metals in the past.  We will start Topamax trial along with lidocaine cream, will send heavy metal blood panel. We will see the patient in 3 months.     HPI  Brayan Gonzalez is a 54 y.o. male with past medical history of hypertension, hypercholesterolemia, obstructive sleep apnea on CPAP, atrial fibrillation on diltiazem and Eliquis, varicose veins of the lower extremities on compression stockings, ruptured biceps tendon in the right side s/p repair in 2010, was referred from his PCP for tingling and numbness involving the distal upper and lower extremities. The patient stated that 1 and half year ago, he started to feel numbness in the lateral aspect of both feet along with the sole. It was intermittent without pain. In addition to that, he started to feel numbness in the tips of the little and ring finger bilaterally. He denied smoking or drinking alcohol or using recreational drugs. He denied weakness, history of stroke or heart attack, dizziness or headaches, change in speech, change in bowel or urinary habits, fever or chills, chest or abdominal pain. The patient was referred for EMG study in December 2020 which revealed evidence of sensorimotor neuropathy involving the extremities which is affecting the legs more than the arms, neuropathy is axonal in type. Basic blood work-up in January 2021 showed normal CBC and BMP. Free T4 in 2/2018 was 0.92, TSH was within normal limits. Physical exam was unremarkable except for decreased all modalities of sensation in the distal extremities include fingers in the little and ring finger distribution bilaterally and lower extremities in the tip of toes and lateral aspect of both feet. PREVIOUS WORKUP:     Lab Results   Component Value Date    WBC 8.7 06/25/2019    HGB 15.3 06/25/2019    HCT 45.7 06/25/2019    MCV 88.7 06/25/2019     06/25/2019       Past Medical History:   Diagnosis Date    Atrial fibrillation [facility])     [facility]    Cancer [facility])     right arm, at site of right arm surgical scar    H/O upper respiratory infection     Hyperlipidemia     Kidney stones     ALLEY on CPAP     Umbilical hernia     Varicose vein of leg     Wellness examination     Dr. Halina Estrada.  [id number]        Past Surgical History:   Procedure Laterality Date    ARM SURGERY Right     x5, tendon injury    ARM SURGERY Left 01/26/2018    extensor tendon repair    CARDIOVERSION      x2, says likely to have another in february 2020    COLONOSCOPY      EYE MUSCLE SURGERY Left as a child    HERNIA REPAIR N/A 12/30/2019    XI LAPAROSCOPIC ROBOTIC [facility] performed by Mohamud Carver MD at [address] [place] [address] Rd Right     ureter, born with 2 right kidneys    TONSILLECTOMY      UMBILICAL HERNIA REPAIR  12/30/2019    LAPAROSCOPIC ROBOTIC UMBILICAL HERNIA REPAIR WITH MESH     VASECTOMY          Social History     Socioeconomic History    Marital status:      Spouse name: Not on file    Number of children: Not on file    Years of education: Not on file    Highest education level: Not on file   Occupational History    Not on file   Tobacco Use    Smoking status: Current Some Day Smoker     Packs/day: 0.00     Years: 10.00     Pack years: 0.00     Types: Cigars    Smokeless tobacco: Never Used    Tobacco comment: I have a few cigars per week   Vaping Use    Vaping Use: Never used   Substance and Sexual Activity    Alcohol use: Yes     Alcohol/week: 4.0 standard drinks     Types: 2 Cans of beer, 2 Standard drinks or equivalent per week     Comment: occ    Drug use: No    Sexual activity: Yes     Partners: Female   Other Topics Concern    Not on file   Social History Narrative    Not on file     Social Determinants of Health     Financial Resource Strain:     Difficulty of Paying Living Expenses:    Food Insecurity:     Worried About Running Out of Food in the Last Year:     Ran Out of Food in the Last Year:    Transportation Needs:     Lack of Transportation (Medical):      Lack of Transportation (Non-Medical):    Physical Activity:     Days of Exercise per Week:     Minutes of Exercise per Session:    Stress:     Feeling of Stress :    Social Connections:     Frequency of Communication with Friends and Family:     Frequency of Social Gatherings with Friends and Family:     Attends Christianity Services:     Active Member of Clubs or Organizations:     Attends Club or Organization Meetings:     Marital Status:    Intimate Partner Violence:     Fear of Current or Ex-Partner:     Emotionally Abused:     Physically Abused:     Sexually Abused:         Current Outpatient Medications   Medication Sig Dispense Refill    triamterene-hydroCHLOROthiazide (MAXZIDE) 75-50 MG per tablet Take 1 tablet by mouth daily      atorvastatin (LIPITOR) 20 MG tablet TAKE ONE TABLET BY MOUTH DAILY 30 tablet 5    allopurinol (ZYLOPRIM) 100 MG tablet TAKE ONE TABLET BY MOUTH DAILY 90 tablet 3    dilTIAZem (DILTIAZEM CD) 120 MG extended release capsule Take 120 mg by mouth daily      flecainide (TAMBOCOR) 100 MG tablet Take 100 mg by mouth 2 times daily      apixaban (ELIQUIS) 5 MG TABS tablet Take 5 mg by mouth Stopping 12/27       No current facility-administered medications for this visit. No Known Allergies     REVIEW OF SYSTEMS:     Review of Systems   Constitutional: Negative for fatigue and fever. HENT: Negative for drooling and ear discharge. Respiratory: Negative for cough and shortness of breath. Cardiovascular: Negative for chest pain. Gastrointestinal: Negative for abdominal pain. Endocrine: Negative for polyuria. Genitourinary: Negative for enuresis and flank pain. Musculoskeletal: Negative for gait problem. Neurological: Positive for numbness. Negative for dizziness, tremors, seizures, syncope, facial asymmetry, speech difficulty, weakness, light-headedness and headaches. Hematological: Negative for adenopathy. Psychiatric/Behavioral: Negative for agitation. VITALS  /87   Pulse 80   Resp 14   Ht 6' 7\" (2.007 m)   Wt (!) 320 lb (145.2 kg)   SpO2 100%   BMI 36.05 kg/m²      PHYSICAL EXAMINATION:     Physical Exam   General appearance: cooperative  Skin: no rash or skin lesions.   HEENT: normocephalic  Optic Fundi: normal   Neck: supple, no cervcical adenopathy or carotid bruit  Lungs: no resp distress   Heart: Regular rate and rhythm,   Peripheral pulses: radial pulses palpable  Abdominal: BS present, soft, NT, ND  Extremities: no edema    NEUROLOGICAL EXAMINATION:     GENERAL  Appears comfortable and in no distress   HEENT  NC/ AT   HEART  S1 and S2 heard; palpation of pulses: radial pulse    NECK  Supple and no bruits heard   MENTAL STATUS:  Alert, oriented, intact memory, no confusion, normal speech, normal language, no hallucination or delusion   CRANIAL NERVES: II     -      Visual fields intact to confrontation  III,IV,VI -  PERR, EOMs full, no ptosis  V     -     Normal facial sensation   VII    -     Normal facial symmetry  VIII   -     Intact hearing   IX,X -     Symmetrical palate  XI    -     Symmetrical shoulder shrug  XII   -     Midline tongue, no atrophy    MOTOR FUNCTION: RUE: Significant for good strength of grade 5/5 in proximal and distal muscle groups   LUE: Significant for good strength of grade 5/5 in proximal and distal muscle groups   RLE: Significant for good strength of grade 5/5 in proximal and distal muscle groups   LLE: Significant for good strength of grade 5/5 in proximal and distal muscle groups      Normal bulk, normal tone and no involuntary movements, no tremor   SENSORY FUNCTION:  decr sensation to touch, pin, vibration, proprioception   CEREBELLAR FUNCTION:  Intact fine motor control over upper limbs and lower limbs   REFLEX FUNCTION:  Symmetric hyporeflexia in upper and lower extremities, no Babinski sign   STATION and GAIT  Normal gait and tandem station, normal tip toes and heel walking       ASSESSMENT:     Jude Keen is a 54 y.o. male with past medical history of hypertension, hypercholesterolemia, obstructive sleep apnea on CPAP, atrial fibrillation on diltiazem and Eliquis, varicose veins of the lower extremities on compression stockings, ruptured biceps tendon in the right side s/p repair in 2010, was referred from his PCP for tingling and numbness involving the distal upper and lower extremities. Peripheral neuropathy. PLAN:     -HbA1c, TSH, SPEP and UPEP, Vit B12, B6, B1, folate >> unremarkable   -ELIAN is negative  -pending ceruloplasmin level and CU level.  -Skin punch biopsy  -Heavy metals panel was unremarkable  -Cymbalta was stopped due to fogginess side effect.  -Stop Topamax due to confusion side effect, stop gabapentin and lidocaine cream with no improvement.  -Start on Lyrica 25 mg 3 times daily and increase the dose accordingly.  -Consider faxing the EMG results from [facility] for possible hereditary sensorimotor neuropathy.  -No images of the spine required for now. -RTC in 3 months. Mr. Blanco Paul received counseling on the following healthy behaviors: medical compliance, smoking cessation, blood pressure control, regular follow up with primary doctor.         Electronically signed by Shady Verma MD on 8/2/2021 at 1:10 PM

## 2021-08-05 ENCOUNTER — HOSPITAL ENCOUNTER (OUTPATIENT)
Age: 56
Discharge: HOME | End: 2021-08-05
Payer: COMMERCIAL

## 2021-08-05 DIAGNOSIS — R20.2 PARESTHESIA: ICD-10-CM

## 2021-08-05 LAB — CERULOPLASMIN: 26 MG/DL (ref 15–30)

## 2021-08-05 PROCEDURE — 36415 COLL VENOUS BLD VENIPUNCTURE: CPT

## 2021-08-05 PROCEDURE — 82525 ASSAY OF COPPER: CPT

## 2021-08-05 PROCEDURE — 82390 ASSAY OF CERULOPLASMIN: CPT

## 2021-08-09 LAB — COPPER: 118.3 UG/DL (ref 70–140)

## 2021-08-16 ENCOUNTER — OFFICE VISIT (OUTPATIENT)
Dept: PODIATRY | Age: 56
End: 2021-08-16
Payer: COMMERCIAL

## 2021-08-16 VITALS — BODY MASS INDEX: 36.45 KG/M2 | WEIGHT: 315 LBS | HEIGHT: 78 IN

## 2021-08-16 DIAGNOSIS — M79.675 PAIN OF TOES OF BOTH FEET: ICD-10-CM

## 2021-08-16 DIAGNOSIS — B35.1 ONYCHOMYCOSIS OF TOENAIL: Primary | ICD-10-CM

## 2021-08-16 DIAGNOSIS — M79.674 PAIN OF TOES OF BOTH FEET: ICD-10-CM

## 2021-08-16 PROCEDURE — 11721 DEBRIDE NAIL 6 OR MORE: CPT | Performed by: PODIATRIST

## 2021-08-16 ASSESSMENT — ENCOUNTER SYMPTOMS
COLOR CHANGE: 0
NAUSEA: 0
DIARRHEA: 0
BACK PAIN: 0
SHORTNESS OF BREATH: 0

## 2021-08-16 NOTE — PROGRESS NOTES
SUBJECTIVE: Nasir Cesar is a 54 y.o. male who returns to the office with chief complaint of painful fungal toenails. Patient relates toe nails are thickened/difficult to trim as well as painful with ambulation and with shoe gear. Chief Complaint   Patient presents with    Nail Problem     b/l nail trim/ last seen Dr. Rosi Waite 8/2/2021     Review of Systems   Constitutional: Negative for activity change, appetite change, chills, diaphoresis, fatigue and fever. Respiratory: Negative for shortness of breath. Cardiovascular: Negative for leg swelling. Gastrointestinal: Negative for diarrhea and nausea. Endocrine: Negative for cold intolerance, heat intolerance and polyuria. Musculoskeletal: Positive for arthralgias. Negative for back pain, gait problem, joint swelling and myalgias. Skin: Negative for color change, pallor, rash and wound. Allergic/Immunologic: Negative for environmental allergies and food allergies. Neurological: Negative for dizziness, weakness, light-headedness and numbness. Hematological: Does not bruise/bleed easily. Psychiatric/Behavioral: Negative for behavioral problems, confusion and self-injury. The patient is not nervous/anxious. OBJECTIVE: Clinical evaluation of patient reveals nails 1,2,3,4,5 of the right foot and nails 1,2,3,4,5, of the left foot to present with thickness, elongation, discoloration, brittleness, and subungual debris. There was pain with palpation and debridement of the toenails of the bilateral feet. No open lesions noted to either foot today. Class A Findings (1 needed)   [] Non-traumatic amputation of foot or integral skeleton portion thereof. [] Q7.      Class B Findings (2 needed)   1. [] Absent posterior tibial pulse   2. [] Absent dorsalis pedis pulse   3.  [] Advanced trophic changes; three of the following are required:   ·         [] hair growth (decrease or absence)   ·         [] nail changes (thickening)   ·         [] pigmentary changes (discoloration)   ·         [] skin texture (thin, shiny)   ·         [] skin color (rubor or redness)   [] Q8.      Class C Findings (1 Class B, 2 Class C needed)   1. [] Claudication   2. [] Temperature changes   3. [] Edema   4. [] Paresthesia   5. [] Burning   [] Q9.     NO CLASS FINDINGS ARE NOTED    ASSESSMENT:    Diagnosis Orders   1. Onychomycosis of toenail  LA DEBRIDEMENT OF NAILS, 6 OR MORE   2. Pain of toes of both feet  LA DEBRIDEMENT OF NAILS, 6 OR MORE     PLAN: Toenails 1,2,3,4,5 of the right foot and 1,2,3,4,5 of the left foot were debrided in length and thickness using a nail nipper and a . Return in about 9 weeks (around 10/18/2021) for Painful fungal nails.    8/16/2021      Jesse Nguyen DPM

## 2021-08-17 ENCOUNTER — HOSPITAL ENCOUNTER (OUTPATIENT)
Dept: PHYSICAL THERAPY | Age: 56
Setting detail: THERAPIES SERIES
Discharge: HOME | End: 2021-08-17
Payer: COMMERCIAL

## 2021-08-17 PROCEDURE — 97112 NEUROMUSCULAR REEDUCATION: CPT

## 2021-08-17 PROCEDURE — 97161 PT EVAL LOW COMPLEX 20 MIN: CPT

## 2021-08-17 NOTE — CONSULTS
[facility]  [address]         Phone: [phone]       Fax: [phone]    Physical Therapy General Evaluation    Date:  2021  Patient: Anita Anand  : 1965  MRN: [id number]  Physician: Claire Crowder MD    Insurance: [facility] --- $ 30 copay   Medical Diagnosis: R20.2 (ICD-10-CM) - Paresthesia    Rehab Codes: R26.81 unsteadiness on feet,   Onset Date: 2 years ago                                 Next 's appt: 10/4/21- neurology    Precautions: On blood thiner      Subjective:   CC: Bilateral numbness in feet     Pt arrives ambulatory and states he is being referred by neurology due to numbness in his feet. States this began after getting shingles vaccine. He will be having a nerve biopsy in the next month. States he will also be seeing immunology to figure out cause of sensorimotor neuropathy. LEs (Feet) are more affected than hands and arms. Notes previous UE injuries that could be contributing to numbness. States he feels most affected by neuropathy in feet. Has to pay attention to feet with walking & stairs. States he feels he has good balance but is affected by not being able to feel feet in contact with the ground. States he has not found anything the that helps with numbness and tingling     Per visit with referring MD 21: Carole Allen is a 54 y.o. male with past medical history of hypertension, hypercholesterolemia, obstructive sleep apnea on CPAP, atrial fibrillation on diltiazem and Eliquis, varicose veins of the lower extremities on compression stockings, ruptured biceps tendon in the right side s/p repair in , was referred from his PCP for tingling and numbness involving the distal upper and lower extremities. The patient stated that 1 and half year ago, he started to feel numbness in the lateral aspect of both feet along with the sole. It was intermittent without pain.   In addition to that, he started to feel numbness in the tips of the little and ring finger bilaterally. He denied smoking or drinking alcohol or using recreational drugs. He denied weakness, history of stroke or heart attack, dizziness or headaches, change in speech, change in bowel or urinary habits, fever or chills, chest or abdominal pain. The patient was referred for EMG study in December 2020 which revealed evidence of sensorimotor neuropathy involving the extremities which is affecting the legs more than the arms, neuropathy is axonal in type. Basic blood work-up in January 2021 showed normal CBC and BMP. Free T4 in 2/2018 was 0.92, TSH was within normal limits. Patient presented with bilateral lower extremity pain and paresthesias in bilateral upper and lower extremities, only involving fingertips and bilateral upper extremities and from the below bilaterally. EMG nerve conduction study concerning for axonal/demyelinating changes in bilateral lower extremities. \"        PMHx: [] Unremarkable [] Diabetes [] HTN  [] Pacemaker   [x] MI/Heart Problems [x] Cancer [] Arthritis [] Asthma                         [x] refer to full medical chart  [facility]  [] Other:        Comorbidities:   [] Obesity [] Dialysis  [x] Other: R arm biceps rupture   [x] Asthma/COPD [] Dementia [x] Other: Cancer removed of right arm    [] Stroke [] Sleep apnea [x] Other:  L elbow extensor tendon repair   [] Vascular disease [] Rheumatic disease [] Other:     Tests/Imaging: patient had EMG nerve conduction study done  outside [facility] which showed moderate symmetrical axonal/demyelinating changes in bilateral lower extremity         Medications: [x] Refer to full medical record [] None [] Other:  Allergies:      [x] Refer to full medical record [] None [] Other:    Function:  Hand Dominance  [x] Right  [] Left  Working:     [x]  Disability  -- 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 Only experiencing numbness and not able to be as fast with activities. Gait Prior level of function Current level of function    [x] Independent  [] Assist [x] Independent  [] Assist   Device: [] Independent [] Independent    [] Straight Cane [] Quad cane [] Straight Cane [] Quad cane    [] Standard walker [] Rolling walker   [] 4 wheeled walker [] Standard walker [] Rolling walker   [] 4 wheeled walker    [] Wheelchair [] Wheelchair     Pain:  [x] Yes  [] No Location: Generalized arthritic pain 5/10       Numbness & tingling bilateral feet 10/10   Pain altered Tx:  [x] Yes  [] No  Action:    Symptoms:  [] Improving [] Worsening [x] Same  Better:  Injections for arthritic pain   Worse: arthritic pain increases with weather       Objective:      ROM  ° A/P STRENGTH  ROM    Left Right Left Right Cervical    Shoulder Flex WNL for all below WNL for all below 5 5 Flexion    Abd   5 5 Extension    Elbow Flex   5 5 Rotation L R   Ext   5 5 Sidebend L R   Wrist Flex   5 5 Retraction    Ext   5 5 Lumbar    Hand      Flexion    Hip Flex   4 5 Extension    Ext   5 5 Rotation L  R   Abd     Sidebend L R   Knee Flex   5 5      Ext   5 5      Ankle DF   4 5      PF   5 5      Inversion   5 5      Eversion   5 5        OBSERVATION No Deficit Deficit Not Tested Comments   Posture       Forward Head [x] [] []    Rounded Shoulders [x] [] []    Kyphosis [x] [] []    Lordosis [x] [] []    Lateral Shift [] [] [x]    Slumped Sitting [] [] []    Palpation [x] [] []    Sensation [] [] [] Numbness in feet, does not go past ankle.  Diminished hot/cold temperature sensation   Edema [x] [] []    Neurological [x] [] []      BALANCE:   - intact static ankle strategies   - with perturbations on firm surface, eyes open & eyes closed, appropriate responses      Functional Tests:   - modified CTSIB:    On stable, EO: no sway    On stable, EC:  No sway    On foam, EO:  No sway, increased ankle strategies    On foam EC:  + sway; 10 seconds     - SLS time    R LE: 4 seconds    L LE:  18 seconds     - FGA: 28/30 -- above fall risk cut off         Comments:    Assessment:    Pt presents with presentation consistent with neuropathy (sensory > motor affected). He has some mild weakness of the L ankle dorsiflexors, decreased balance reactions when on unstable surfaces, limited light touch and proprioceptive awareness of bilateral feet. He is still relatively independent with daily mobility but does note some unsteadiness and fear of doing higher level activities due to neuropathy changes in bilateral LEs. Per FGA, he scores at top ranges with good dynamic stability, indicative of decreased fall risk. He does have mild difficulty with tandem walking as his awareness of foot position is decreased so he does not consistently go fully heel to toe. Some balance deficits noted with SLS stability. Has appropriate ankle strategies but decreased time on R. This could be affected by old ankle injuries, but will still want to build neuromuscular control to improve stability. Modified CTSIB confirms decreased sensory input that affects balance. Will complete a short POC focused on education on reducing fall risk, best ways to maintain strength with neuropathy, and increasing balance reactions with mobility to reduce risk for falls. Believe pt has a good prognosis to improve balance reactions and increase awareness, but unlikely to change degree of numbness in feet. Patient would benefit from skilled physical therapy services in order to: improve ankle strength, increase reaction time, and improve body awareness, educate on reducing fall risk to optimize safety and efficiency with mobility. Problems:    [] ? Pain:  [] ? ROM:  [x] ? Strength:  [x] ? Function:  [x] Other: decreased balance reactions/neuromuscualr control     STG/LTG: (to be met in 8 treatments)  1.  Pt will be independent with home program addressing strength, flexibility and balance to maximize gains made in therapy to improve overall functional capacity for mobility. 2. Pt will increase SLS time to >10 seconds bilaterally to improve control needed to navigate stairs. 3. Pt will increase L DF strength to 5/5 to improve ability to control descent down stairs if toe were to be over the edge. 4. Pt will verbalize understanding of various ways to reduce fall risk with home and community mobility   5. Pt will demonstrate appropriate balance reactions with self-correction of balance >75% of time when on unstable surfaces with eyes closed to reduce fall risk if he navigating his environment in low lighting. Patient goals:  - help numbness in feet       Functional Assessment Used: ABC Scale   Current Status: 91 % self-confidence of balance   Goal Status: No goal status set as pt is likely at ceiling of outcome measure     Evaluation Complexity:  History (Personal factors, comorbidities) [] 0 [x] 1-2 [] 3+   Exam (limitations, restrictions) [x] 1-2 [] 3 [] 4+   Clinical presentation (progression) [] Stable [] Evolving  [] Unstable   Decision Making [x] Low [] Moderate [] High    [x] Low Complexity [] Moderate Complexity [] High Complexity     Rehab Potential:  [x] Good  [] Fair  [] Poor   Suggested Professional Referral:  [x] No  [] Yes:  Barriers to Goal Achievement[de-identified]  [x] No  [] Yes:  Domestic Concerns:  [x] No  [] Yes:    Pt. Education:  [x] Plans/Goals, Risks/Benefits discussed  [] Home exercise program  Method of Education: [x] Verbal  [x] Demo  [] Written  Comprehension of Education:  [x] Verbalizes understanding. [x] Demonstrates understanding. [] Needs Review. [] Demonstrates/verbalizes understanding of HEP/Ed previously given.     Treatment Plan:  [x] Therapeutic Exercise   34377  [] Iontophoresis: 4 mg/mL Dexamethasone Sodium Phosphate  mAmin  44032   [x] Therapeutic Activity  04606 [] Vasopneumatic cold with compression  [id number]    [x] Gait Training   [id number] [] Ultrasound   [id number]   [x] Neuromuscular Re-education  [id number] [] Electrical Stimulation Unattended  25593   [] Manual Therapy  48945 [] Electrical Stimulation Attended  [id number]   [x] Instruction in HEP  [] Lumbar/Cervical Traction  [id number]   [] Aquatic Therapy   [id number] [] Cold/hotpack    [] Massage   70761      [] Dry Needling, 1 or 2 muscles  05573   [] Biofeedback, first 15 minutes   02640  [] Biofeedback, additional 15 minutes   97893 [] Dry Needling, 3 or more muscles  66238     Frequency:  2 x/week for 8 visits    Todays Treatment:  - educated on outcomes of testing and correlation to fall risk   - discussed the difference btwn sensory and motor neuropathy. Educated on the importance of maintaining strength in affected musculature. - Educated on the 3 balance systems (vision, sensory, and vestibular). Educated on need to ensure vision is optimal and vestibular system is good to counter deficits in sensory system. Specific Instructions for next treatment: begin with treatment focusing on ankle & foot strengthening, balance reactions & neuromuscular control with use of unstable surfaces with eyes closed.  Provide with initial home program     Treatment Charges: Mins Units   [x] Evaluation       [x]  Low       []  Moderate       []  High 44 1   []  Modalities     []  Ther Exercise     []  Manual Therapy     []  Ther Activities     []  Aquatics     [x]  Neuromuscular 10 1   []  Gait Training     []  Dry Needling           1-2 muscles     []  Dry Needling           3 or more muscles     [] Vasocompression     []  Other       TOTAL TREATMENT TIME: 54  (10 minutes billed time)      Time CI:7246     Time [facility]:8321    Electronically signed by: Virgilio Talavera PT

## 2021-08-24 ENCOUNTER — HOSPITAL ENCOUNTER (OUTPATIENT)
Dept: PHYSICAL THERAPY | Age: 56
Setting detail: THERAPIES SERIES
Discharge: HOME | End: 2021-08-24
Payer: COMMERCIAL

## 2021-08-24 PROCEDURE — 97112 NEUROMUSCULAR REEDUCATION: CPT

## 2021-08-24 NOTE — FLOWSHEET NOTE
[address] [facility]   [address]   Phone: [phone]   Fax: [phone]    Physical Therapy Daily Treatment Note      Date:  2021  Patient Name:  Royer Aguilar    :  1965  MRN: [id number]  Physician: Isaac Coles MD                        Insurance: [employer] --- $ 30 copay   Medical Diagnosis: R20.2 (ICD-10-CM) - Paresthesia                      Rehab Codes: R26.81 unsteadiness on feet,   Onset Date: 2 years ago                                 Next 's appt: 10/4/21- neurology  Visit# / total visits: 2  Cancels/No Shows: 0/0    Subjective:  Trouble with stairs and a little difficulty in yard. Feet feel like lead blocks and left leg feels more unsteady than right. 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 Also reports he is due for knee injections within the next month so knees are feeling mild soreness  Pain:  [x] Yes  [] No Location: (B) Feet/Ankles Pain Rating: (0-10 scale) 7-8/10 Intensity of numbness and tingling   Pain altered Tx:  [x] No  [] Yes  Action:  Comments:     Objective:  Modalities:   Precautions: On blood thiner; History of knee pain  EMG nerve conduction study concerning for axonal/demyelinating changes in bilateral lower extremities.   Exercises:  Exercise Reps/ Time Weight/ Level Completed  Today Comments   3-Way Hip EO/EC    HEP   Squat EO/EC 10X/10X  x HEP   Heel Toe Raises EO/EC    HEP          Standing Feet Together EC 30\"  X HEP   Standing Feet Together EC 2-way head motion (up/down & Side/side) 10x each  X More difficulty with cervical ext   SLS (L) 5', 4\", 2\"  (R) 3\", 2\", 5\"  X HEP   Tandem EO/EC 30\" each  X HEP          Lunges       Rocker Board Balance 3-ways       Rocker Board Rock 3-ways       Foam Semi Tandem 2# Ball ABC's 1x each  X    Foam Semi Tandem EC 2x20\"  X                         Other:    Specific Instructions for next treatment: progress treatment focusing on ankle & foot strengthening, balance reactions & neuromuscular control with use of unstable surfaces with eyes closed. Assessment: [] Progressing toward goals. Patient presents himself with wider Base of support when static standing and during gait. Continued patient education on 3 balance systems and importance of therapy to progress/stregthen both vestibular and visual input to assist with poor proprioceptive input. Patient frustrated with activity that challenge balance- encouraged patient to keep pushing and working with HEP issued this date. Also instructed patient to perform exercise in a safe environment- near a corner or stable surface that UE support is available as needed. Patient demonstrates most difficulty with SL balance, uneven surfaces especially with Eyes closed. Patient does verbalize imbalance with cervical extension in standing. Overall patient tolerated program well    [] No change. [] Other:  Patient would benefit from skilled physical therapy services in order to: improve ankle strength, increase reaction time, and improve body awareness, educate on reducing fall risk to optimize safety and efficiency with mobility.        STG/LTG: (to be met in 8 treatments)  1. Pt will be independent with home program addressing strength, flexibility and balance to maximize gains made in therapy to improve overall functional capacity for mobility. 2. Pt will increase SLS time to >10 seconds bilaterally to improve control needed to navigate stairs. 3. Pt will increase L DF strength to 5/5 to improve ability to control descent down stairs if toe were to be over the edge. 4. Pt will verbalize understanding of various ways to reduce fall risk with home and community mobility   5.  Pt will demonstrate appropriate balance reactions with self-correction of balance >75% of time when on unstable surfaces with eyes closed to reduce fall risk if he navigating his environment in low lighting.      Patient goals:  - help numbness in feet     Pt. Education:  [x] Yes  [] No  [x] Reviewed Prior HEP/Ed  Method of Education: [x] Verbal  [x] Demo  [x] Written  Comprehension of Education:  [x] Verbalizes understanding. [] Demonstrates understanding. [] Needs review. [] Demonstrates/verbalizes HEP/Ed previously given. Plan: [x] Continue per plan of care.    [] Other:      Treatment Charges: Mins Units   []  Modalities     []  Ther Exercise     []  Manual Therapy     []  Ther Activities     []  Aquatics     [x]  Neuromuscular 38 3   [] Vasocompression     [] Gait Training     [] Dry needling        [] 1 or 2 muscles        [] 3 or more muscles     []  Other     Total Treatment time 38 3     Time In: 890 AM           Time Out: 729 AM    Electronically signed by:  Anatoly Clemens PTA

## 2021-08-27 ENCOUNTER — HOSPITAL ENCOUNTER (OUTPATIENT)
Dept: PHYSICAL THERAPY | Age: 56
Setting detail: THERAPIES SERIES
Discharge: HOME | End: 2021-08-27
Payer: COMMERCIAL

## 2021-08-27 PROCEDURE — 97112 NEUROMUSCULAR REEDUCATION: CPT

## 2021-08-27 NOTE — FLOWSHEET NOTE
[address] [facility]   [address]   Phone: [phone]   Fax: [phone]    Physical Therapy Daily Treatment Note      Date:  2021  Patient Name:  Royer Aguilar    :  1965  MRN: [id number]  Physician: Isaac Coles MD                        Insurance: [employer] --- $ 30 copay   Medical Diagnosis: R20.2 (ICD-10-CM) - Paresthesia                      Rehab Codes: R26.81 unsteadiness on feet,   Onset Date: 2 years ago                                 Next 's appt: 10/4/21- neurology  Visit# / total visits: 3/8  Cancels/No Shows: 0/0    Subjective:  Pt notes he is still having numbness in bilateral feet. 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 Has been doing home program. Noticing most difficulty with eyes closed. Pain:  [x] Yes  [] No Location: (B) Feet/Ankles Pain Rating: (0-10 scale) 10/10 Intensity of numbness and tingling   Pain altered Tx:  [x] No  [] Yes  Action:  Comments:     Objective:   Modalities:   Precautions: On blood thiner; History of knee pain  EMG nerve conduction study concerning for axonal/demyelinating changes in bilateral lower extremities.   Exercises:  Exercise Reps/ Time Weight/ Level Completed  Today Comments   3-Way Hip EO/EC    HEP   Squat EO/EC 10X/10X  x HEP on firm; progressed    Heel Toe Raises EO/EC    HEP          Standing Feet Together EC 30\"   HEP   Standing Feet Together EC 2-way head motion (up/down & Side/side) 10x each   More difficulty with cervical ext   SLS (L) 5', 4\", 2\"  (R) 3\", 2\", 5\"   HEP   Tandem EO/EC 30\" each   HEP   SLS with arm raises 2x10 ea   x More instability on R    Lunges       Rocker Board Balance F/B & lateral 2x30s  x No UE support    Rocker Board Rock: F/B & lateral 2x20   x No UE support    Foam Semi Tandem 2# Ball ABC's 1x each  x    Foam Semi Tandem EC 2x30\"  x    Semi tandem heel raises  2x10  x    Toe raises x20  x    Toe taps to cone on foam 2x20  x No UE support    Other:    Specific Instructions for next treatment: progress treatment focusing on ankle & foot strengthening, balance reactions & neuromuscular control with use of unstable surfaces with eyes closed. Assessment: [] Progressing toward goals. Continued to focus on neuromuscular control and continue to strengthen bilateral ankles to counter the deficits that can related to neuropathy. Pt is most challenged with unstable and Eyes closed balance tasks. Demos increased ankle strategies but they are appropriate despite limited sensory awareness. Other balance reactions intact but delayed. May be appropriate plan of care as pt is very compliant with home programming and will be returning to work soon. Overall patient tolerated program well    [] No change. [] Other:  Patient would benefit from skilled physical therapy services in order to: improve ankle strength, increase reaction time, and improve body awareness, educate on reducing fall risk to optimize safety and efficiency with mobility.        STG/LTG: (to be met in 8 treatments)  1. Pt will be independent with home program addressing strength, flexibility and balance to maximize gains made in therapy to improve overall functional capacity for mobility. 2. Pt will increase SLS time to >10 seconds bilaterally to improve control needed to navigate stairs. 3. Pt will increase L DF strength to 5/5 to improve ability to control descent down stairs if toe were to be over the edge. 4. Pt will verbalize understanding of various ways to reduce fall risk with home and community mobility   5. Pt will demonstrate appropriate balance reactions with self-correction of balance >75% of time when on unstable surfaces with eyes closed to reduce fall risk if he navigating his environment in low lighting.      Patient goals:  - help numbness in feet     Pt.  Education:  [x] Yes  [] No  [x] Reviewed Prior HEP/Ed  Method of Education: [x] Verbal [x] Demo  [x] Written  Comprehension of Education:  [x] Verbalizes understanding. [] Demonstrates understanding. [] Needs review. [] Demonstrates/verbalizes HEP/Ed previously given. Plan: [x] Continue per plan of care.    [] Other:      Treatment Charges: Mins Units   []  Modalities     []  Ther Exercise     []  Manual Therapy     []  Ther Activities     []  Aquatics     [x]  Neuromuscular 46 3   [] Vasocompression     [] Gait Training     [] Dry needling        [] 1 or 2 muscles        [] 3 or more muscles     []  Other     Total Treatment time 46 3     Time In: 7785 AM           Time Out: 9143 AM    Electronically signed by:  Shakila Yuen, PT

## 2021-08-30 ENCOUNTER — HOSPITAL ENCOUNTER (OUTPATIENT)
Dept: PHYSICAL THERAPY | Age: 56
Setting detail: THERAPIES SERIES
Discharge: HOME | End: 2021-08-30
Payer: COMMERCIAL

## 2021-08-30 PROCEDURE — 97112 NEUROMUSCULAR REEDUCATION: CPT

## 2021-08-30 NOTE — FLOWSHEET NOTE
[address] [facility]   [id number] [facility] [address]   Phone: [phone]   Fax: [phone]    Physical Therapy Daily Treatment Note      Date:  2021  Patient Name:  Sol Schneider    :  1965  MRN: [id number]  Physician: Steven Fulton MD                        Insurance: Reggie Rubalcava --- $ 30 copay   Medical Diagnosis: R20.2 (ICD-10-CM) - Paresthesia                      Rehab Codes: R26.81 unsteadiness on feet,   Onset Date: 2 years ago                                 Next 's appt: 10/4/21- neurology  Visit# / total visits:   Cancels/No Shows: 0/0    Subjective:  Continues with most difficulty eyes closed denies pain stating feet just feel dead. Feet were very stiff and swollen over weekend but was on his feet a lot. Pain:  [x] Yes  [] No Location: (B) Feet/Ankles Pain Rating: (0-10 scale) 10/10 Intensity of numbness and tingling   Pain altered Tx:  [x] No  [] Yes  Action:  Comments:     Objective:   Modalities:   Precautions: On blood thiner; History of knee pain  EMG nerve conduction study concerning for axonal/demyelinating changes in bilateral lower extremities.   Exercises:  Exercise Reps/ Time Weight/ Level Completed  Today Comments   3-Way Hip FOAM EO/EC 10x/5x  X    Squat FOAM EO/EC 10X/10X  x                         Foam Wedge Bolster Toes ups/Toes Down Feet normal stance - EC 2x30\" each  x           BOSU Step Ups Fwd/Lat 10x each  x    SLS with arm raises 2x10 ea    More instability on R           Rocker Board Balance F/B & lateral 30\" each  x No UE support    Rocker Board Rock: F/B & lateral 30\" each  x No UE support    Foam Balance Beam Semi Tandem 2# Ball ABC's 1x each  x    Foam Balance Beam Semi Tandem EC 2x30\"  x    Semi tandem heel raises  2x10      Toe raises x20  X    Alternating Toe taps to cone on 8\" step while standing on foam 10x each  X No UE support    Other:    Specific Instructions for next treatment: Progress patient Indep HEP- wishing to most likely DC next visit      Assessment: [x] Progressing toward goals. Continued to challenge balance with uneven surfaces and vision occluded. Patient demonstrates good effort with activity. [] No change. [] Other:  Patient would benefit from skilled physical therapy services in order to: improve ankle strength, increase reaction time, and improve body awareness, educate on reducing fall risk to optimize safety and efficiency with mobility.        STG/LTG: (to be met in 8 treatments)  1. Pt will be independent with home program addressing strength, flexibility and balance to maximize gains made in therapy to improve overall functional capacity for mobility. 2. Pt will increase SLS time to >10 seconds bilaterally to improve control needed to navigate stairs. 3. Pt will increase L DF strength to 5/5 to improve ability to control descent down stairs if toe were to be over the edge. 4. Pt will verbalize understanding of various ways to reduce fall risk with home and community mobility   5. Pt will demonstrate appropriate balance reactions with self-correction of balance >75% of time when on unstable surfaces with eyes closed to reduce fall risk if he navigating his environment in low lighting.      Patient goals:  - help numbness in feet     Pt. Education:  [x] Yes  [] No  [x] Reviewed Prior HEP/Ed  Method of Education: [x] Verbal  [x] Demo  [x] Written  Comprehension of Education:  [x] Verbalizes understanding. [x] Demonstrates understanding. [] Needs review. [] Demonstrates/verbalizes HEP/Ed previously given. Plan: [x] Continue per plan of care.    [] Other:      Treatment Charges: Mins Units   []  Modalities     []  Ther Exercise     []  Manual Therapy     []  Ther Activities     []  Aquatics     [x]  Neuromuscular 40 3   [] Vasocompression     [] Gait Training     [] Dry needling        [] 1 or 2 muscles        [] 3 or more muscles     []  Other     Total Treatment time 40 3 Time In: 833 AM           Time Out: 374 AM    Electronically signed by:  Max Cheng, PTA

## 2021-09-02 ENCOUNTER — HOSPITAL ENCOUNTER (OUTPATIENT)
Dept: PHYSICAL THERAPY | Age: 56
Setting detail: THERAPIES SERIES
Discharge: HOME | End: 2021-09-02
Payer: COMMERCIAL

## 2021-09-02 PROCEDURE — 97110 THERAPEUTIC EXERCISES: CPT

## 2021-09-02 PROCEDURE — 97112 NEUROMUSCULAR REEDUCATION: CPT

## 2021-09-02 NOTE — DISCHARGE SUMMARY
[address] [facility]   [address] [facility] [address]   Phone: [phone]   Fax: [phone]    Physical Therapy Daily Treatment Note/Discharge note       Date:  2021  Patient Name:  Nasir Cesar    :  1965  MRN: [id number]  Physician: Boris Regan MD                        Insurance: [employer] --- $ 30 copay   Medical Diagnosis: R20.2 (ICD-10-CM) - Paresthesia                      Rehab Codes: R26.81 unsteadiness on feet,   Onset Date: 2 years ago                                 Next 's appt: 10/4/21- neurology  Visit# / total visits:   Cancels/No Shows: 0/0     Subjective:    Pt notes numbness is still present. Reports some soreness from doing yard at home. Reports no falls or imbalance recently. Pain:  [x] Yes  [] No Location: (B) Feet/Ankles Pain Rating: (0-10 scale) 10/10 Intensity of numbness and tingling   Pain altered Tx:  [x] No  [] Yes  Action:  Comments:     Objective:   Modalities:   Precautions: On blood thiner; History of knee pain  EMG nerve conduction study concerning for axonal/demyelinating changes in bilateral lower extremities.   Exercises:  Exercise Reps/ Time Weight/ Level Completed  Today Comments   3-Way Hip FOAM EO/EC 10x/5x      Squat FOAM EO/EC 10X/10X      Heel raises x20  x HEP    Standing toe curls x15  x Hep education   Great toe extension x15  x HEP   Foot doming  x15  x HEP   Foam Wedge Bolster Toes ups/Toes Down Feet normal stance - EC 2x30\" each      Lesser toe extension x15  x HEP   rhomberg on foam, EC, head turns vertical & horizontal 2x20ea  x    BOSU Step Ups Fwd/Lat 10x each      SLS with arm raises 2x10 ea    More instability on R    SLS on foam  3x20s ea  x    Rocker Board Balance F/B & lateral 30\" each   No UE support    Rocker Board Rock: F/B & lateral 30\" each   No UE support    Foam Balance Beam Semi Tandem 2# Ball ABC's 1x each      Foam Balance Beam Semi Tandem EC 2x30\"      Semi tandem heel/toe raises x20  x HEP education    Toe raises x20  x    Alternating Toe taps to cone on 8\" step while standing on foam 10x2 each  x No UE support    Other:  9/2/21: Educated on need for compliance with home program           Assessment:   Pt was initially evaluated on 8/17/21 and has completed x5 visits address deficits related to sensory/motor neuropathy. He has completed x4 visits in [address] [facility]. Throughout highlighted the need for maintaining foot and ankle strength. Provided with additional exercises for home program to maintain this strength. Also highlighted balance work as part of home program as will need to heighten the vestibular & maintain vision to compensate for decreased sensation in feet/ankles. Pt notes understanding of all this. He demonstrates awareness of his balance with appropriate balance reactions. Believe pt has potential to maintain strength and continue to build upon balance work with home program. At this time, pt is appropriate to discharge as progress and all goals have been met.        STG/LTG: (to be met in 8 treatments)  1. Pt will be independent with home program addressing strength, flexibility and balance to maximize gains made in therapy to improve overall functional capacity for mobility. (MET 9/2)   2. Pt will increase SLS time to >10 seconds bilaterally to improve control needed to navigate stairs. (MET 9/2 -- demos on foam)   3. Pt will increase L DF strength to 5/5 to improve ability to control descent down stairs if toe were to be over the edge. (MET 9/2)   4. Pt will verbalize understanding of various ways to reduce fall risk with home and community mobility (MET 9/2 )   5. Pt will demonstrate appropriate balance reactions with self-correction of balance >75% of time when on unstable surfaces with eyes closed to reduce fall risk if he navigating his environment in low lighting. (MET 9/2 -- demos with no LOB this session)      Patient goals:  - help numbness in feet     Pt.  Education:  [x] Yes [] No  [x] Reviewed Prior HEP/Ed  Method of Education: [x] Verbal  [x] Demo  [x] Written  Comprehension of Education:  [x] Verbalizes understanding. [x] Demonstrates understanding. [] Needs review. [x] Demonstrates/verbalizes HEP/Ed previously given.      Plan: discharge       Treatment Charges: Mins Units   []  Modalities     [x]  Ther Exercise 10 1   []  Manual Therapy     []  Ther Activities     []  Aquatics     [x]  Neuromuscular 30 2   [] Vasocompression     [] Gait Training     [] Dry needling        [] 1 or 2 muscles        [] 3 or more muscles     []  Other     Total Treatment time 40 3     Time In: 0830          Time Out: 2053    Electronically signed by:  Arlyn Spencer PT

## 2021-09-22 ENCOUNTER — HOSPITAL ENCOUNTER (OUTPATIENT)
Age: 56
Discharge: HOME | End: 2021-09-22
Payer: COMMERCIAL

## 2021-09-22 LAB
ABSOLUTE EOS #: 0.2 K/UL (ref 0–0.4)
ABSOLUTE IMMATURE GRANULOCYTE: ABNORMAL K/UL (ref 0–0.3)
ABSOLUTE LYMPH #: 2 K/UL (ref 1–4.8)
ABSOLUTE MONO #: 0.6 K/UL (ref 0.1–1.3)
ALBUMIN SERPL-MCNC: 4.2 G/DL (ref 3.5–5.2)
ALBUMIN/GLOBULIN RATIO: ABNORMAL (ref 1–2.5)
ALP BLD-CCNC: 89 U/L (ref 40–129)
ALT SERPL-CCNC: 24 U/L (ref 5–41)
ANION GAP SERPL CALCULATED.3IONS-SCNC: 10 MMOL/L (ref 9–17)
AST SERPL-CCNC: 16 U/L
BASOPHILS # BLD: 0 % (ref 0–2)
BASOPHILS ABSOLUTE: 0 K/UL (ref 0–0.2)
BILIRUB SERPL-MCNC: 0.49 MG/DL (ref 0.3–1.2)
BUN BLDV-MCNC: 14 MG/DL (ref 6–20)
BUN/CREAT BLD: ABNORMAL (ref 9–20)
CALCIUM SERPL-MCNC: 9.4 MG/DL (ref 8.6–10.4)
CHLORIDE BLD-SCNC: 99 MMOL/L (ref 98–107)
CHOLESTEROL/HDL RATIO: 3.8
CHOLESTEROL: 220 MG/DL
CO2: 29 MMOL/L (ref 20–31)
CREAT SERPL-MCNC: 1.23 MG/DL (ref 0.7–1.2)
DIFFERENTIAL TYPE: ABNORMAL
EOSINOPHILS RELATIVE PERCENT: 2 % (ref 0–4)
GFR AFRICAN AMERICAN: >60 ML/MIN
GFR NON-AFRICAN AMERICAN: >60 ML/MIN
GFR SERPL CREATININE-BSD FRML MDRD: ABNORMAL ML/MIN/{1.73_M2}
GFR SERPL CREATININE-BSD FRML MDRD: ABNORMAL ML/MIN/{1.73_M2}
GLUCOSE BLD-MCNC: 107 MG/DL (ref 70–99)
HCT VFR BLD CALC: 45.3 % (ref 41–53)
HDLC SERPL-MCNC: 58 MG/DL
HEMOGLOBIN: 15.4 G/DL (ref 13.5–17.5)
IMMATURE GRANULOCYTES: ABNORMAL %
LDL CHOLESTEROL: 150 MG/DL (ref 0–130)
LYMPHOCYTES # BLD: 20 % (ref 24–44)
MCH RBC QN AUTO: 30.1 PG (ref 26–34)
MCHC RBC AUTO-ENTMCNC: 34 G/DL (ref 31–37)
MCV RBC AUTO: 88.4 FL (ref 80–100)
MONOCYTES # BLD: 6 % (ref 1–7)
NRBC AUTOMATED: ABNORMAL PER 100 WBC
PDW BLD-RTO: 15.6 % (ref 11.5–14.9)
PLATELET # BLD: 250 K/UL (ref 150–450)
PLATELET ESTIMATE: ABNORMAL
PMV BLD AUTO: 7.2 FL (ref 6–12)
POTASSIUM SERPL-SCNC: 4.1 MMOL/L (ref 3.7–5.3)
RBC # BLD: 5.13 M/UL (ref 4.5–5.9)
RBC # BLD: ABNORMAL 10*6/UL
SEG NEUTROPHILS: 72 % (ref 36–66)
SEGMENTED NEUTROPHILS ABSOLUTE COUNT: 7 K/UL (ref 1.3–9.1)
SODIUM BLD-SCNC: 138 MMOL/L (ref 135–144)
TOTAL PROTEIN: 7.3 G/DL (ref 6.4–8.3)
TRIGL SERPL-MCNC: 58 MG/DL
VLDLC SERPL CALC-MCNC: ABNORMAL MG/DL (ref 1–30)
WBC # BLD: 9.9 K/UL (ref 3.5–11)
WBC # BLD: ABNORMAL 10*3/UL

## 2021-09-22 PROCEDURE — 36415 COLL VENOUS BLD VENIPUNCTURE: CPT

## 2021-09-22 PROCEDURE — 80053 COMPREHEN METABOLIC PANEL: CPT

## 2021-09-22 PROCEDURE — 80061 LIPID PANEL: CPT

## 2021-09-22 PROCEDURE — 85025 COMPLETE CBC W/AUTO DIFF WBC: CPT

## 2021-10-19 ENCOUNTER — OFFICE VISIT (OUTPATIENT)
Dept: NEUROLOGY | Age: 56
End: 2021-10-19
Payer: COMMERCIAL

## 2021-10-19 VITALS
TEMPERATURE: 98.1 F | BODY MASS INDEX: 36.45 KG/M2 | DIASTOLIC BLOOD PRESSURE: 87 MMHG | HEIGHT: 78 IN | HEART RATE: 61 BPM | WEIGHT: 315 LBS | SYSTOLIC BLOOD PRESSURE: 143 MMHG

## 2021-10-19 DIAGNOSIS — R20.0 NUMBNESS: Primary | ICD-10-CM

## 2021-10-19 PROCEDURE — 99204 OFFICE O/P NEW MOD 45 MIN: CPT | Performed by: PSYCHIATRY & NEUROLOGY

## 2021-10-19 NOTE — PROGRESS NOTES
[facility], [address] [place], [address] [place] [address]  Ph: [phone] or [phone]  FAX: [phone]    Chief Complaint: Neuropathy     Dear Yefri Khoury MD     I had the pleasure of seeing your patient today in neurology consultation for his symptoms. As you would recall Avinash Osuna is a 54 y.o. male. The patient presents to the office for continued care regarding numbness in his lower extremities. The paresthesia was first observed approximately 3 years ago following his second Shingles vaccination. The patient reports that this numbness is not associated with any pain and extends from the midline of his calf to his feet bilaterally. Since the numbness was observed, it has steadily worsened overtime. He has tried multiple pain medications such as Gabapentin and Cymbalta with no relief. Patient reports difficulty walking down steps. Denies falls and weakness. He reports difficulty with balance especially when his eyes are closed. The patient denies any family history of numbness. Patient reports that he has not started Lyrica 25 mg TID. He has a history of kidney stones. Patient reports that he was taking allopurinol for an extended period of time prior to onset of paresthesia. He is not diabetic. EMG on 12/16/2020: This is an abnormal study. There is electrophysiologic evidence for a sensorimotor neuropathy involving the extremities which is affecting the legs more than the arms. Electrophysiologic findings in the lower extremities indicate that the neuropathy is axonal in type. Past Medical History:   Diagnosis Date    Atrial fibrillation [facility])     [address] [facility]    Cancer [facility])     right arm, at site of right arm surgical scar    H/O upper respiratory infection     Hyperlipidemia     Kidney stones     ALLEY on CPAP     Umbilical hernia     Varicose vein of leg     Wellness examination     Dr. Gracie Freed.  [id number]     Past Surgical History:   Procedure Laterality Date    ARM SURGERY Right     x5, tendon injury    ARM SURGERY Left 01/26/2018    extensor tendon repair    CARDIOVERSION      x2, says likely to have another in february 2020    COLONOSCOPY      EYE MUSCLE SURGERY Left as a child    HERNIA REPAIR N/A 12/30/2019    XI LAPAROSCOPIC ROBOTIC [address] [facility] performed by Rosa Boyer MD at [address] [place] [address] Rd Right     ureter, born with 2 right kidneys    TONSILLECTOMY      UMBILICAL HERNIA REPAIR  12/30/2019    LAPAROSCOPIC ROBOTIC UMBILICAL HERNIA REPAIR WITH MESH     VASECTOMY       No Known Allergies  Family History   Problem Relation Age of Onset    Coronary Art Dis Mother     Stroke Mother     Cancer Brother         malignant melanoma of skin    Other Brother         seizures, bipolar, memory loss    Diabetes Maternal Uncle     Lung Cancer Maternal Grandfather     Coronary Art Dis Paternal Grandmother     Cancer Other         breast    Other Father         dementia    High Blood Pressure Sister     Asthma Sister     Arthritis Sister     High Blood Pressure Sister     Other Sister     Arthritis Sister       Social History     Socioeconomic History    Marital status:      Spouse name: Not on file    Number of children: Not on file    Years of education: Not on file    Highest education level: Not on file   Occupational History    Not on file   Tobacco Use    Smoking status: Current Some Day Smoker     Packs/day: 0.00     Years: 10.00     Pack years: 0.00     Types: Cigars    Smokeless tobacco: Never Used    Tobacco comment: I have a few cigars per week   Vaping Use    Vaping Use: Never used   Substance and Sexual Activity    Alcohol use:  Yes     Alcohol/week: 4.0 standard drinks     Types: 2 Cans of beer, 2 Standard drinks or equivalent per week     Comment: occ    Drug use: No    Sexual activity: Yes     Partners: Female Other Topics Concern    Not on file   Social History Narrative    Not on file     Social Determinants of Health     Financial Resource Strain:     Difficulty of Paying Living Expenses:    Food Insecurity:     Worried About Running Out of Food in the Last Year:     [address] in the Last Year:    Transportation Needs:     Lack of Transportation (Medical):  Lack of Transportation (Non-Medical):    Physical Activity:     Days of Exercise per Week:     Minutes of Exercise per Session:    Stress:     Feeling of Stress :    Social Connections:     Frequency of Communication with Friends and Family:     Frequency of Social Gatherings with Friends and Family:     Attends [employer] Services:     Active Member of Clubs or Organizations:     Attends Club or Organization Meetings:     Marital Status:    Intimate Partner Violence:     Fear of Current or Ex-Partner:     Emotionally Abused:     Physically Abused:     Sexually Abused: There were no vitals taken for this visit.       HEENT [] Hearing Loss  [] Visual Disturbance  [] Tinnitus  [] Eye pain   Respiratory [] Shortness of Breath  [] Cough  [] Snoring   Cardiovascular [] Chest Pain  [] Palpitations  [] Lightheaded   GI [] Constipation  [] Diarrhea  [] Swallowing change  [] Nausea/vomiting    [] Urinary Frequency  [] Urinary Urgency   Musculoskeletal [] Neck pain  [] Back pain  [] Muscle pain  [] Restless legs   Dermatologic [] Skin changes   Neurologic [] Memory loss/confusion  [] Seizures  [x] Trouble walking or imbalance  [] Dizziness  [] Sleep disturbance  [] Weakness  [x] Numbness  [] Tremors  [] Speech Difficulty  [] Headaches  [] Light Sensitivity  [] Sound Sensitivity   Endocrinology []Excessive thirst  []Excessive hunger   Psychiatric [] Anxiety/Depression  [] Hallucination   Allergy/immunology []Hives/environmental allergies   Hematologic/lymph [] Abnormal bleeding  [] Abnormal bruising       General appearence: Normal. Well groomed. In no acute distress    Head: Normocephalic, atraumatic  Eyes: Extraocular movements intact, eye lids normal  Lungs: Respirations unlabored, chest wall no deformity  ENT: Normal external ear canals, no sinus tenderness  Heart: Regular rate rhythm  Abdomen: No masses, tenderness  Extremities: No cyanosis or edema, 2+ pulses  Musculoskeletal: Normal range of motion in all joints  Skin: Intact, normal skin color    Neurological examination:    Mental status   Alert and oriented; intact memory with no confusion, speech or language problems; no hallucinations or delusions     Cranial nerves   II - visual fields intact to confrontation                                                III, IV, VI  extra-ocular muscles full: no pupillary defect; no ALBERTA, no nystagmus, no ptosis   V - normal facial sensation                                                               VII - normal facial symmetry                                                             VIII - intact hearing                                                                             IX, X - symmetrical palate                                                                  XI - symmetrical shoulder shrug                                                       XII - midline tongue without atrophy or fasciculation     Motor function  Normal muscle bulk and tone; normal power 5/5, including fine motor movements     Sensory function Intact to touch, pin, vibration, proprioception     Cerebellar Intact fine motor movement. No involuntary movements or tremors     Reflex function Intact 2+ DTR and symmetric.  Negative Babinski     Gait                  Normal station and gait       Lab Results   Component Value Date    LDLCHOLESTEROL 150 (H) 09/22/2021     No components found for: CHLPL  Lab Results   Component Value Date    TRIG 58 09/22/2021    TRIG 109 05/16/2018    TRIG 95 02/03/2018     Lab Results   Component Value Date    HDL 58 09/22/2021    HDL 58 07/10/2020    HDL 53 06/25/2019     No results found for: LDLCALC  No results found for: LABVLDL  Lab Results   Component Value Date    LABA1C 5.2 02/08/2021     Lab Results   Component Value Date     02/08/2021     Lab Results   Component Value Date    [id number] 194 02/03/2021      Neurological work up:  EMG on 12/16/2020: This is an abnormal study. There is electrophysiologic evidence for a sensorimotor neuropathy involving the extremities which is affecting the legs more than the arms. Electrophysiologic findings in the lower extremities indicate that the neuropathy is axonal in type. All of patient's labs were personally reviewed. All the imaging studies were personally reviewed and discussed with the patient. Assessment Recommendations:  Sensorimotor neuropathy, idiopathic    The patient reports that his paresthesia is not associated with any pain. For this reason, I do not see a need to initiate a pain management mediation such as gabapentin. I recommend the patient complete a 2 hr. glucose tolerance test to identify if the patient is pre-diabetic. Additionally, I recommend he complete repeat lab testing in the next year to identify any abnormalities. Fall precautions and safety protocols were discussed with the patient. Also, I advised the patient to consider using a cane to ambulate which may lower the risk for falls. Patient to follow up in 6-8 months or sooner if symptoms worsen. Lashawn Islas MD I would like to thank you for the consult. Please do not hesitate if you have any questions about the patient care. This note is created with the assistance of a speech-recognition program. While intending to generate a document that actually reflects the content of the visit, the document can still have some errors including those of syntax and sound a- like substitutions which may escape proofreading.   In such instances, actual meaning can be extrapolated by contextual derivation. Scribe Attestation:   By signing my name below, Dutch Mckenna, attest that this documentation has been prepared under the direction and in the presence of Yasmine Asher MD.    Electronically Signed: Manny Alves. 10/19/21. 11:36 AM    I, Laura Mcdonald MD, personally performed the services described in this documentation. All medical record entries made by the scribe were at my direction and in my presence. I have reviewed the chart and discharge instructions (if applicable) and agree that the record reflects my personal performance and is accurate and complete.     Electronically Signed: Laura Mcdonald 10/19/2021 11:36 AM    Diplomate, American Board of Psychiatry and Neurology  Diplomate, American Board of Clinical Neurophysiology  Diplomate, American Board of Epilepsy

## 2021-10-21 ENCOUNTER — HOSPITAL ENCOUNTER (OUTPATIENT)
Age: 56
Discharge: HOME | End: 2021-10-21
Payer: COMMERCIAL

## 2021-10-21 DIAGNOSIS — R20.0 NUMBNESS: ICD-10-CM

## 2021-10-21 LAB
AMOUNT GLUCOSE GIVEN: 75 G
GLUCOSE FASTING: 90 MG/DL (ref 65–99)
GLUCOSE TOLERANCE TEST 1 HOUR: 184 MG/DL (ref 65–184)
GLUCOSE TOLERANCE TEST 2 HOUR: 103 MG/DL (ref 65–139)

## 2021-10-21 PROCEDURE — 36415 COLL VENOUS BLD VENIPUNCTURE: CPT

## 2021-10-21 PROCEDURE — 82951 GLUCOSE TOLERANCE TEST (GTT): CPT

## 2021-11-09 ENCOUNTER — OFFICE VISIT (OUTPATIENT)
Dept: PODIATRY | Age: 56
End: 2021-11-09
Payer: COMMERCIAL

## 2021-11-09 VITALS — WEIGHT: 315 LBS | BODY MASS INDEX: 36.45 KG/M2 | HEIGHT: 78 IN

## 2021-11-09 DIAGNOSIS — M79.675 PAIN OF TOES OF BOTH FEET: ICD-10-CM

## 2021-11-09 DIAGNOSIS — B07.0 PLANTAR VERRUCA: ICD-10-CM

## 2021-11-09 DIAGNOSIS — M79.671 PAIN IN RIGHT FOOT: ICD-10-CM

## 2021-11-09 DIAGNOSIS — G60.9 IDIOPATHIC NEUROPATHY: ICD-10-CM

## 2021-11-09 DIAGNOSIS — M79.674 PAIN OF TOES OF BOTH FEET: ICD-10-CM

## 2021-11-09 DIAGNOSIS — B35.1 ONYCHOMYCOSIS OF TOENAIL: Primary | ICD-10-CM

## 2021-11-09 PROCEDURE — 99213 OFFICE O/P EST LOW 20 MIN: CPT | Performed by: PODIATRIST

## 2021-11-09 PROCEDURE — 11721 DEBRIDE NAIL 6 OR MORE: CPT | Performed by: PODIATRIST

## 2021-11-09 PROCEDURE — 17110 DESTRUCTION B9 LES UP TO 14: CPT | Performed by: PODIATRIST

## 2021-11-09 ASSESSMENT — ENCOUNTER SYMPTOMS
SHORTNESS OF BREATH: 0
COLOR CHANGE: 0
BACK PAIN: 0
DIARRHEA: 0
NAUSEA: 0

## 2021-11-09 NOTE — PROGRESS NOTES
SUBJECTIVE: Jeffrey Ford is a 54 y.o. male who returns to the office with chief complaint of painful fungal toenails. Patient relates toe nails are thickened/difficult to trim as well as painful with ambulation and with shoe gear. Patient also complains of a painful lesion to the bottom of the right foot that he thinks may be a wart. Chief Complaint   Patient presents with    Nail Problem     b/l nail trim, last seen Cathy Uribe MD 5/19/21    Other     possible plantar wart, right foot     Review of Systems   Constitutional: Negative for activity change, appetite change, chills, diaphoresis, fatigue and fever. Respiratory: Negative for shortness of breath. Cardiovascular: Negative for leg swelling. Gastrointestinal: Negative for diarrhea and nausea. Endocrine: Negative for cold intolerance, heat intolerance and polyuria. Musculoskeletal: Positive for arthralgias. Negative for back pain, gait problem, joint swelling and myalgias. Skin: Negative for color change, pallor, rash and wound. Allergic/Immunologic: Negative for environmental allergies and food allergies. Neurological: Negative for dizziness, weakness, light-headedness and numbness. Hematological: Does not bruise/bleed easily. Psychiatric/Behavioral: Negative for behavioral problems, confusion and self-injury. The patient is not nervous/anxious. OBJECTIVE: Clinical evaluation of patient reveals nails 1,2,3,4,5 of the right foot and nails 1,2,3,4,5, of the left foot to present with thickness, elongation, discoloration, brittleness, and subungual debris. There was pain with palpation and debridement of the toenails of the bilateral feet. No open lesions noted to either foot today. There is hyperkeratotic tissue formation noted to the plantar aspect of the right foot. There is pain with side to side compression of this lesion. Debridement of this lesion with a fifteen blade produces pinpoint bleeding.  No signs of bacterial infection are noted to the area. Class A Findings (1 needed)   [] Non-traumatic amputation of foot or integral skeleton portion thereof. [] Q7.      Class B Findings (2 needed)   1. [] Absent posterior tibial pulse   2. [] Absent dorsalis pedis pulse   3. [] Advanced trophic changes; three of the following are required:   ·         [] hair growth (decrease or absence)   ·         [] nail changes (thickening)   ·         [] pigmentary changes (discoloration)   ·         [] skin texture (thin, shiny)   ·         [] skin color (rubor or redness)   [] Q8.      Class C Findings (1 Class B, 2 Class C needed)   1. [] Claudication   2. [] Temperature changes   3. [] Edema   4. [] Paresthesia   5. [] Burning   [] Q9.     NO CLASS FINDINGS ARE NOTED    ASSESSMENT:    Diagnosis Orders   1. Onychomycosis of toenail  AK DEBRIDEMENT OF NAILS, 6 OR MORE   2. Plantar verruca  AK DESTRUCTION BENIGN LESIONS UP TO 14   3. Pain of toes of both feet  AK DEBRIDEMENT OF NAILS, 6 OR MORE   4. Pain in right foot  AK DESTRUCTION BENIGN LESIONS UP TO 14   5. Idiopathic neuropathy  AK DEBRIDEMENT OF NAILS, 6 OR MORE    AK DESTRUCTION BENIGN LESIONS UP TO 14     PLAN: Toenails 1,2,3,4,5 of the right foot and 1,2,3,4,5 of the left foot were debrided in length and thickness using a nail nipper and a . Following debridement of the wart(s) to the right foot with a fifteen blade, the area(s) were treated with Phenol and covered with a Band-Aid. The patient was instructed to apply Apple cider vinegar and duct tape to the wart(s) nightly. Return in about 2 weeks (around 11/23/2021) for Wart management.    11/9/2021      Jocelin Lu DPM

## 2021-11-22 ENCOUNTER — OFFICE VISIT (OUTPATIENT)
Dept: PODIATRY | Age: 56
End: 2021-11-22
Payer: COMMERCIAL

## 2021-11-22 VITALS — BODY MASS INDEX: 36.45 KG/M2 | WEIGHT: 315 LBS | HEIGHT: 78 IN

## 2021-11-22 DIAGNOSIS — B07.0 PLANTAR VERRUCA: Primary | ICD-10-CM

## 2021-11-22 DIAGNOSIS — M79.671 PAIN IN RIGHT FOOT: ICD-10-CM

## 2021-11-22 PROCEDURE — 99999 PR OFFICE/OUTPT VISIT,PROCEDURE ONLY: CPT | Performed by: PODIATRIST

## 2021-11-22 PROCEDURE — 17110 DESTRUCTION B9 LES UP TO 14: CPT | Performed by: PODIATRIST

## 2021-11-22 ASSESSMENT — ENCOUNTER SYMPTOMS
BACK PAIN: 0
COLOR CHANGE: 0
NAUSEA: 0
DIARRHEA: 0
SHORTNESS OF BREATH: 0

## 2021-11-22 NOTE — PROGRESS NOTES
[address] [place] [address] Podiatry  Return Patient Progress Note    Subjective: Marifer No 64 y.o. male that presents for follow up evaluation of wart to the right foot. Chief Complaint   Patient presents with    Check-Up     wart follow up    Patient's treatment thus far has included nightly application of apple cider vinegar and duct tape. Pain is rated 2 out of 10 and is described as intermittent. Patient has been following my prescribed course of therapy as instructed. Review of Systems   Constitutional: Negative for activity change, appetite change, chills, diaphoresis, fatigue and fever. Respiratory: Negative for shortness of breath. Cardiovascular: Negative for leg swelling. Gastrointestinal: Negative for diarrhea and nausea. Endocrine: Negative for cold intolerance, heat intolerance and polyuria. Musculoskeletal: Positive for arthralgias. Negative for back pain, gait problem, joint swelling and myalgias. Skin: Negative for color change, pallor, rash and wound. Allergic/Immunologic: Negative for environmental allergies and food allergies. Neurological: Negative for dizziness, weakness, light-headedness and numbness. Hematological: Does not bruise/bleed easily. Psychiatric/Behavioral: Negative for behavioral problems, confusion and self-injury. The patient is not nervous/anxious. Objective: Clinical evaluation of the patient reveals hyperkeratotic tissue formation to the plantar aspect of the right foot. There is pain with side to side compression of this lesion. Debridement of this lesion with a fifteen blade produces pinpoint bleeding. No signs of bacterial infection are noted to the area. This area has improved since last visit. Assessment:    Diagnosis Orders   1. Plantar verruca  FL DESTRUCTION BENIGN LESIONS UP TO 14   2. Pain in right foot  FL DESTRUCTION BENIGN LESIONS UP TO 14         Plan: 1. Clinical evaluation of the patient.  2. Following debridement of the wart(s)

## 2022-01-11 ENCOUNTER — OFFICE VISIT (OUTPATIENT)
Dept: PODIATRY | Age: 57
End: 2022-01-11
Payer: COMMERCIAL

## 2022-01-11 VITALS — HEIGHT: 78 IN | BODY MASS INDEX: 36.45 KG/M2 | WEIGHT: 315 LBS

## 2022-01-11 DIAGNOSIS — B35.1 ONYCHOMYCOSIS OF TOENAIL: Primary | ICD-10-CM

## 2022-01-11 DIAGNOSIS — M79.675 PAIN OF TOES OF BOTH FEET: ICD-10-CM

## 2022-01-11 DIAGNOSIS — M79.674 PAIN OF TOES OF BOTH FEET: ICD-10-CM

## 2022-01-11 PROCEDURE — 11721 DEBRIDE NAIL 6 OR MORE: CPT | Performed by: PODIATRIST

## 2022-01-11 ASSESSMENT — ENCOUNTER SYMPTOMS
NAUSEA: 0
DIARRHEA: 0
BACK PAIN: 0
COLOR CHANGE: 0
SHORTNESS OF BREATH: 0

## 2022-01-11 NOTE — PROGRESS NOTES
SUBJECTIVE: Vidya Perdomo is a 64 y.o. male who returns to the office with chief complaint of painful fungal toenails. Patient relates toe nails are thickened/difficult to trim as well as painful with ambulation and with shoe gear. Chief Complaint   Patient presents with    Nail Problem     b/l nail trim/ last seen Dr. Yolis Rivas 1/5/2022     Review of Systems   Constitutional: Negative for activity change, appetite change, chills, diaphoresis, fatigue and fever. Respiratory: Negative for shortness of breath. Cardiovascular: Negative for leg swelling. Gastrointestinal: Negative for diarrhea and nausea. Endocrine: Negative for cold intolerance, heat intolerance and polyuria. Musculoskeletal: Positive for arthralgias. Negative for back pain, gait problem, joint swelling and myalgias. Skin: Negative for color change, pallor, rash and wound. Allergic/Immunologic: Negative for environmental allergies and food allergies. Neurological: Negative for dizziness, weakness, light-headedness and numbness. Hematological: Does not bruise/bleed easily. Psychiatric/Behavioral: Negative for behavioral problems, confusion and self-injury. The patient is not nervous/anxious. OBJECTIVE: Clinical evaluation of patient reveals nails 1,2,3,4,5 of the right foot and nails 1,2,3,4,5 of the left foot to present with thickness, elongation, discoloration, brittleness, and subungual debris. There was pain with palpation and debridement of the toenails of the bilateral feet. No open lesions noted to either foot today. Class A Findings (1 needed)   [] Non-traumatic amputation of foot or integral skeleton portion thereof. [] Q7.      Class B Findings (2 needed)   1. [] Absent posterior tibial pulse   2. [] Absent dorsalis pedis pulse   3.  [] Advanced trophic changes; three of the following are required:   ·         [] hair growth (decrease or absence)   ·         [] nail changes (thickening)   ·         [] pigmentary changes (discoloration)   ·         [] skin texture (thin, shiny)   ·         [] skin color (rubor or redness)   [] Q8.      Class C Findings (1 Class B, 2 Class C needed)   1. [] Claudication   2. [] Temperature changes   3. [] Edema   4. [] Paresthesia   5. [] Burning   [] Q9.     NO CLASS FINDINGS ARE NOTED    ASSESSMENT:    Diagnosis Orders   1. Onychomycosis of toenail  OK DEBRIDEMENT OF NAILS, 6 OR MORE   2. Pain of toes of both feet  OK DEBRIDEMENT OF NAILS, 6 OR MORE     PLAN: Toenails 1,2,3,4,5 of the right foot and 1,2,3,4,5 of the left foot were debrided in length and thickness using a nail nipper and a . Return in about 9 weeks (around 3/15/2022) for Painful fungal nails.    1/11/2022      Oralia Gann DPM

## 2022-02-23 ENCOUNTER — HOSPITAL ENCOUNTER (OUTPATIENT)
Age: 57
Discharge: HOME | End: 2022-02-23
Payer: COMMERCIAL

## 2022-02-23 ENCOUNTER — OFFICE VISIT (OUTPATIENT)
Dept: PRIMARY CARE CLINIC | Age: 57
End: 2022-02-23
Payer: COMMERCIAL

## 2022-02-23 ENCOUNTER — TELEPHONE (OUTPATIENT)
Dept: PRIMARY CARE CLINIC | Age: 57
End: 2022-02-23

## 2022-02-23 VITALS
BODY MASS INDEX: 36.45 KG/M2 | SYSTOLIC BLOOD PRESSURE: 148 MMHG | HEART RATE: 83 BPM | WEIGHT: 315 LBS | DIASTOLIC BLOOD PRESSURE: 92 MMHG | OXYGEN SATURATION: 98 % | HEIGHT: 78 IN

## 2022-02-23 DIAGNOSIS — N20.0 NEPHROLITHIASIS: ICD-10-CM

## 2022-02-23 DIAGNOSIS — I10 ESSENTIAL HYPERTENSION: ICD-10-CM

## 2022-02-23 DIAGNOSIS — G62.9 NEUROPATHY: ICD-10-CM

## 2022-02-23 DIAGNOSIS — I48.0 PAROXYSMAL ATRIAL FIBRILLATION (HCC): ICD-10-CM

## 2022-02-23 DIAGNOSIS — R31.0 GROSS HEMATURIA: ICD-10-CM

## 2022-02-23 DIAGNOSIS — R31.0 GROSS HEMATURIA: Primary | ICD-10-CM

## 2022-02-23 LAB
ABSOLUTE EOS #: 0.2 K/UL (ref 0–0.4)
ABSOLUTE LYMPH #: 1.7 K/UL (ref 1–4.8)
ABSOLUTE MONO #: 0.6 K/UL (ref 0.1–1.3)
ANION GAP SERPL CALCULATED.3IONS-SCNC: 13 MMOL/L (ref 9–17)
BASOPHILS # BLD: 0 % (ref 0–2)
BASOPHILS ABSOLUTE: 0 K/UL (ref 0–0.2)
BILIRUBIN, POC: NEGATIVE
BLOOD URINE, POC: ABNORMAL
BUN BLDV-MCNC: 14 MG/DL (ref 6–20)
CALCIUM IONIZED: 1.16 MMOL/L (ref 1.13–1.33)
CALCIUM SERPL-MCNC: 9.5 MG/DL (ref 8.6–10.4)
CHLORIDE BLD-SCNC: 101 MMOL/L (ref 98–107)
CLARITY, POC: ABNORMAL
CO2: 28 MMOL/L (ref 20–31)
COLOR, POC: ABNORMAL
CREAT SERPL-MCNC: 1.22 MG/DL (ref 0.7–1.2)
EOSINOPHILS RELATIVE PERCENT: 2 % (ref 0–4)
GFR AFRICAN AMERICAN: >60 ML/MIN
GFR NON-AFRICAN AMERICAN: >60 ML/MIN
GFR SERPL CREATININE-BSD FRML MDRD: ABNORMAL ML/MIN/{1.73_M2}
GLUCOSE BLD-MCNC: 129 MG/DL (ref 70–99)
GLUCOSE URINE, POC: NEGATIVE
HCT VFR BLD CALC: 45 % (ref 41–53)
HEMOGLOBIN: 15.4 G/DL (ref 13.5–17.5)
KETONES, POC: NEGATIVE
LEUKOCYTE EST, POC: ABNORMAL
LYMPHOCYTES # BLD: 21 % (ref 24–44)
MCH RBC QN AUTO: 30 PG (ref 26–34)
MCHC RBC AUTO-ENTMCNC: 34.3 G/DL (ref 31–37)
MCV RBC AUTO: 87.4 FL (ref 80–100)
MONOCYTES # BLD: 7 % (ref 1–7)
NITRITE, POC: NEGATIVE
PDW BLD-RTO: 14.5 % (ref 11.5–14.9)
PH, POC: 6
PLATELET # BLD: 251 K/UL (ref 150–450)
PMV BLD AUTO: 7.2 FL (ref 6–12)
POTASSIUM SERPL-SCNC: 3.5 MMOL/L (ref 3.7–5.3)
PROTEIN, POC: NEGATIVE
RBC # BLD: 5.15 M/UL (ref 4.5–5.9)
SEG NEUTROPHILS: 70 % (ref 36–66)
SEGMENTED NEUTROPHILS ABSOLUTE COUNT: 5.6 K/UL (ref 1.3–9.1)
SODIUM BLD-SCNC: 142 MMOL/L (ref 135–144)
SPECIFIC GRAVITY, POC: 1.02
URIC ACID: 11 MG/DL (ref 3.4–7)
UROBILINOGEN, POC: ABNORMAL
WBC # BLD: 8.1 K/UL (ref 3.5–11)

## 2022-02-23 PROCEDURE — 85025 COMPLETE CBC W/AUTO DIFF WBC: CPT

## 2022-02-23 PROCEDURE — 84550 ASSAY OF BLOOD/URIC ACID: CPT

## 2022-02-23 PROCEDURE — 82330 ASSAY OF CALCIUM: CPT

## 2022-02-23 PROCEDURE — 36415 COLL VENOUS BLD VENIPUNCTURE: CPT

## 2022-02-23 PROCEDURE — 81003 URINALYSIS AUTO W/O SCOPE: CPT | Performed by: FAMILY MEDICINE

## 2022-02-23 PROCEDURE — 99214 OFFICE O/P EST MOD 30 MIN: CPT | Performed by: FAMILY MEDICINE

## 2022-02-23 PROCEDURE — 80048 BASIC METABOLIC PNL TOTAL CA: CPT

## 2022-02-23 RX ORDER — TAMSULOSIN HYDROCHLORIDE 0.4 MG/1
0.4 CAPSULE ORAL DAILY
Qty: 30 CAPSULE | Refills: 0 | Status: SHIPPED | OUTPATIENT
Start: 2022-02-23 | End: 2022-03-21

## 2022-02-23 RX ORDER — SULFAMETHOXAZOLE AND TRIMETHOPRIM 800; 160 MG/1; MG/1
1 TABLET ORAL 2 TIMES DAILY
Qty: 20 TABLET | Refills: 0 | Status: SHIPPED | OUTPATIENT
Start: 2022-02-23 | End: 2022-03-05

## 2022-02-23 RX ORDER — CIPROFLOXACIN 500 MG/1
500 TABLET, FILM COATED ORAL 2 TIMES DAILY
Qty: 20 TABLET | Refills: 0 | Status: SHIPPED | OUTPATIENT
Start: 2022-02-23 | End: 2022-03-05

## 2022-02-23 SDOH — ECONOMIC STABILITY: FOOD INSECURITY: WITHIN THE PAST 12 MONTHS, THE FOOD YOU BOUGHT JUST DIDN'T LAST AND YOU DIDN'T HAVE MONEY TO GET MORE.: NEVER TRUE

## 2022-02-23 SDOH — ECONOMIC STABILITY: FOOD INSECURITY: WITHIN THE PAST 12 MONTHS, YOU WORRIED THAT YOUR FOOD WOULD RUN OUT BEFORE YOU GOT MONEY TO BUY MORE.: NEVER TRUE

## 2022-02-23 ASSESSMENT — ENCOUNTER SYMPTOMS
NAUSEA: 0
EYE DISCHARGE: 0
SORE THROAT: 0
SHORTNESS OF BREATH: 0
EYE REDNESS: 0
COUGH: 0
RHINORRHEA: 0
WHEEZING: 0
ABDOMINAL PAIN: 0
VOMITING: 0
DIARRHEA: 0

## 2022-02-23 ASSESSMENT — PATIENT HEALTH QUESTIONNAIRE - PHQ9
SUM OF ALL RESPONSES TO PHQ QUESTIONS 1-9: 0
SUM OF ALL RESPONSES TO PHQ QUESTIONS 1-9: 0
1. LITTLE INTEREST OR PLEASURE IN DOING THINGS: 0
SUM OF ALL RESPONSES TO PHQ QUESTIONS 1-9: 0
SUM OF ALL RESPONSES TO PHQ9 QUESTIONS 1 & 2: 0
SUM OF ALL RESPONSES TO PHQ QUESTIONS 1-9: 0
2. FEELING DOWN, DEPRESSED OR HOPELESS: 0

## 2022-02-23 ASSESSMENT — SOCIAL DETERMINANTS OF HEALTH (SDOH): HOW HARD IS IT FOR YOU TO PAY FOR THE VERY BASICS LIKE FOOD, HOUSING, MEDICAL CARE, AND HEATING?: NOT HARD AT ALL

## 2022-02-23 NOTE — TELEPHONE ENCOUNTER
Cipro has potential reaction with flecainide, it can cause 2T elongation. Asking if you are aware and if you want to change or stay on cipro?

## 2022-02-23 NOTE — PROGRESS NOTES
[facility] PRIMARY CARE  [address] [address] [address]  Dept: Manolo is a 64 y.o. male Established patient, who presents today for his medical conditions/complaints as noted below. Chief Complaint   Patient presents with    Other     Discuss referral to urology    Flu Vaccine     Declined       HPI:     HPI  Pt states started with blood in urine for past 5 days. No burning. Has constant discomfort. Has been off of allupurinol since September. Patient's allopurinol was stopped by neurology. Patient has been diagnosed with neuropathy of feet of an unknown origin. Patient states has been sometime since his last kidney stone. Patient not checking his blood pressure outside the office. Cardiology note was reviewed showing elevated blood pressure at that time. Patient reluctant to adjust his medication at this time. Has been taking his Eliquis and Tambocor as prescribed. Cardiology note reviewed. Patient  does have some dizziness when he lays down in bed or rolls over. For his neuropathy patient has been referred to immunologist for second opinion. Patient states had extensive work-up including heavy metals, EMG.     Reviewed prior notes Cardiology and Neurology  Reviewed previous Labs    LDL Cholesterol (mg/dL)   Date Value   09/22/2021 150 (H)   07/10/2020 88   06/25/2019 80       (goal LDL is <100)   AST (U/L)   Date Value   09/22/2021 16     ALT (U/L)   Date Value   09/22/2021 24     BUN (mg/dL)   Date Value   09/22/2021 14     Hemoglobin A1C (%)   Date Value   02/08/2021 5.2     TSH (mIU/L)   Date Value   02/03/2018 1.98     BP Readings from Last 3 Encounters:   02/23/22 (!) 158/92   10/19/21 (!) 143/87   08/02/21 136/87          (goal 120/80)    Past Medical History:   Diagnosis Date    Atrial fibrillation [facility])     [facility] Cardiology    Cancer [facility])     right arm, at site of right arm surgical scar  H/O upper respiratory infection     Hyperlipidemia     Kidney stones     ALLEY on CPAP     Umbilical hernia     Varicose vein of leg     Wellness examination     Dr. Lenin Brewer. [id number]      Past Surgical History:   Procedure Laterality Date    ARM SURGERY Right     x5, tendon injury    ARM SURGERY Left 01/26/2018    extensor tendon repair    CARDIOVERSION      x2, says likely to have another in february 2020   [address] Left as a child    HERNIA REPAIR N/A 12/30/2019    XI LAPAROSCOPIC ROBOTIC [facility] performed by Aston Peterson MD at [address] Right     ureter, born with 2 right kidneys    TONSILLECTOMY      UMBILICAL HERNIA REPAIR  12/30/2019    LAPAROSCOPIC ROBOTIC UMBILICAL HERNIA REPAIR WITH MESH     VASECTOMY         Family History   Problem Relation Age of Onset    Coronary Art Dis Mother     Stroke Mother     Cancer Brother         malignant melanoma of skin    Other Brother         seizures, bipolar, memory loss    Diabetes Maternal Uncle     Lung Cancer Maternal Grandfather     Coronary Art Dis Paternal Grandmother     Cancer Other         breast    Other Father         dementia    High Blood Pressure Sister     Asthma Sister     Arthritis Sister     High Blood Pressure Sister     Other Sister     Arthritis Sister        Social History     Tobacco Use    Smoking status: Current Some Day Smoker     Packs/day: 0.00     Years: 10.00     Pack years: 0.00     Types: Cigars    Smokeless tobacco: Never Used    Tobacco comment: I have a few cigars per week   Substance Use Topics    Alcohol use:  Yes     Alcohol/week: 4.0 standard drinks     Types: 2 Cans of beer, 2 Standard drinks or equivalent per week     Comment: occ      Current Outpatient Medications   Medication Sig Dispense Refill    ciprofloxacin (CIPRO) 500 MG tablet Take 1 tablet by mouth 2 times daily for 10 days 20 tablet 0    tamsulosin (FLOMAX) 0.4 MG capsule Take 1 capsule by mouth daily 30 capsule 0    triamterene-hydroCHLOROthiazide (MAXZIDE) 75-50 MG per tablet Take 1 tablet by mouth daily      dilTIAZem (DILTIAZEM CD) 120 MG extended release capsule Take 120 mg by mouth daily      flecainide (TAMBOCOR) 100 MG tablet Take 100 mg by mouth 2 times daily      apixaban (ELIQUIS) 5 MG TABS tablet Take 5 mg by mouth Stopping 12/27       No current facility-administered medications for this visit. No Known Allergies    Health Maintenance   Topic Date Due    COVID-19 Vaccine (1) Never done    Depression Screen  Never done    HIV screen  Never done    Flu vaccine (1) Never done    Potassium monitoring  09/22/2022    Creatinine monitoring  09/22/2022    Diabetes screen  10/21/2024    Colorectal Cancer Screen  01/15/2026    Lipid screen  09/22/2026    DTaP/Tdap/Td vaccine (3 - Td or Tdap) 08/15/2028    Pneumococcal 0-64 years Vaccine (2 of 2 - PPSV23) 11/20/2030    Shingles Vaccine  Completed    Hepatitis C screen  Completed    Hepatitis A vaccine  Aged Out    Hepatitis B vaccine  Aged Out    Hib vaccine  Aged Out    Meningococcal (ACWY) vaccine  Aged Out       Subjective:      Review of Systems   Constitutional: Negative for chills and fever. HENT: Negative for rhinorrhea and sore throat. Eyes: Negative for discharge and redness. Respiratory: Negative for cough, shortness of breath and wheezing. Cardiovascular: Negative for chest pain and palpitations. Gastrointestinal: Negative for abdominal pain, diarrhea, nausea and vomiting. Genitourinary: Positive for hematuria. Negative for dysuria and frequency. Musculoskeletal: Negative for arthralgias and myalgias. Neurological: Positive for numbness. Negative for dizziness, light-headedness and headaches. Psychiatric/Behavioral: Negative for sleep disturbance.        Objective:     BP (!) 158/92   Pulse 83   Ht 6' 6.96\" (2.006 m)   Wt (!) 364 lb 12.8 oz (165.5 kg)   SpO2 98%   BMI 41.14 kg/m²   Physical Exam  Vitals and nursing note reviewed. Constitutional:       General: He is not in acute distress. Appearance: He is well-developed. He is not ill-appearing. HENT:      Head: Normocephalic and atraumatic. Right Ear: External ear normal.      Left Ear: External ear normal.   Eyes:      General: No scleral icterus. Right eye: No discharge. Left eye: No discharge. Conjunctiva/sclera: Conjunctivae normal.      Pupils: Pupils are equal, round, and reactive to light. Neck:      Thyroid: No thyromegaly. Trachea: No tracheal deviation. Cardiovascular:      Rate and Rhythm: Normal rate and regular rhythm. Heart sounds: Normal heart sounds. Pulmonary:      Effort: Pulmonary effort is normal. No respiratory distress. Breath sounds: Normal breath sounds. No wheezing. Lymphadenopathy:      Cervical: No cervical adenopathy. Skin:     General: Skin is warm. Findings: No rash. Neurological:      Mental Status: He is alert and oriented to person, place, and time. Psychiatric:         Mood and Affect: Mood normal.         Behavior: Behavior normal.         Thought Content: Thought content normal.         Assessment:       Diagnosis Orders   1. Gross hematuria  POCT Urinalysis No Micro (Auto)    Culture, Urine    US RENAL COMPLETE    ciprofloxacin (CIPRO) 500 MG tablet    tamsulosin (FLOMAX) 0.4 MG capsule    CBC with Auto Differential    Basic Metabolic Panel    Uric Acid    Calcium, Ionized    Culture, Urine   2. Nephrolithiasis  Basic Metabolic Panel    Uric Acid    Calcium, Ionized   3. Essential hypertension     4. Paroxysmal atrial fibrillation (HCC)     5. Neuropathy          Plan:    Urine sent for culture  Complete renal ultrasound  Blood work ordered today. If uric acid elevated, would restart allopurinol  Blood pressure cuff order.   Patient advised to check blood pressure outside of office. If systolic continues to run above 140, would increase strength of Cardizem  Cipro for possible infection at this time  Start Flomax until stone is passed    Return in about 6 months (around 8/23/2022). Orders Placed This Encounter   Procedures    Culture, Urine     Standing Status:   Future     Number of Occurrences:   1     Standing Expiration Date:   2/23/2023     Order Specific Question:   Specify (ex-cath, midstream, cysto, etc)? Answer:   clean catch    US RENAL COMPLETE     This procedure can be scheduled via [email or web address]. Access your [email or web address] account by visiting [email or web address]. Standing Status:   Future     Standing Expiration Date:   2/23/2023     Order Specific Question:   Reason for exam:     Answer:   gross hematuria    CBC with Auto Differential     Standing Status:   Future     Standing Expiration Date:   2/23/2023    Basic Metabolic Panel     Standing Status:   Future     Standing Expiration Date:   2/23/2023    Uric Acid     Standing Status:   Future     Standing Expiration Date:   2/23/2023    Calcium, Ionized     Standing Status:   Future     Standing Expiration Date:   2/23/2023    POCT Urinalysis No Micro (Auto)     Orders Placed This Encounter   Medications    ciprofloxacin (CIPRO) 500 MG tablet     Sig: Take 1 tablet by mouth 2 times daily for 10 days     Dispense:  20 tablet     Refill:  0    tamsulosin (FLOMAX) 0.4 MG capsule     Sig: Take 1 capsule by mouth daily     Dispense:  30 capsule     Refill:  0       Patient given educational materials - see patient instructions. Discussed use, benefit, and side effects of prescribed medications. All patient questions answered. Pt voiced understanding. Reviewed health maintenance. Instructed to continue current medications, diet andexercise. Patient agreed with treatment plan. Follow up as directed.      Electronicallysigned by Jossy Akers MD on 2/23/2022 at 9:31 AM

## 2022-02-23 NOTE — TELEPHONE ENCOUNTER
Patient is stating he had apt today and the medication you gave today is giving the pharm a conflict because he is currently taking flecainide already.      Please confirm   Patient will like a call back

## 2022-02-24 RX ORDER — ALLOPURINOL 100 MG/1
TABLET ORAL
Qty: 180 TABLET | Refills: 3 | Status: SHIPPED | OUTPATIENT
Start: 2022-02-24

## 2022-02-24 RX ORDER — POTASSIUM CHLORIDE 750 MG/1
10 TABLET, EXTENDED RELEASE ORAL DAILY
Qty: 90 TABLET | Refills: 3 | Status: SHIPPED | OUTPATIENT
Start: 2022-02-24 | End: 2022-08-15

## 2022-02-24 NOTE — RESULT ENCOUNTER NOTE
Advise patient uric acid is elevated, potassium is borderline low. Allopurinol and potassium sent to pharmacy.   Please follow instructions

## 2022-02-25 LAB
CULTURE: ABNORMAL
Lab: ABNORMAL
SPECIMEN DESCRIPTION: ABNORMAL

## 2022-02-28 ENCOUNTER — HOSPITAL ENCOUNTER (OUTPATIENT)
Dept: ULTRASOUND IMAGING | Age: 57
Discharge: HOME | End: 2022-03-02
Payer: COMMERCIAL

## 2022-02-28 DIAGNOSIS — R31.0 GROSS HEMATURIA: ICD-10-CM

## 2022-02-28 PROCEDURE — 76770 US EXAM ABDO BACK WALL COMP: CPT

## 2022-02-28 RX ORDER — LEVOFLOXACIN 500 MG/1
500 TABLET, FILM COATED ORAL DAILY
Qty: 10 TABLET | Refills: 0 | Status: SHIPPED | OUTPATIENT
Start: 2022-02-28 | End: 2022-03-10

## 2022-03-15 ENCOUNTER — PATIENT MESSAGE (OUTPATIENT)
Dept: PRIMARY CARE CLINIC | Age: 57
End: 2022-03-15

## 2022-03-15 DIAGNOSIS — N20.0 NEPHROLITHIASIS: ICD-10-CM

## 2022-03-15 DIAGNOSIS — R31.0 GROSS HEMATURIA: Primary | ICD-10-CM

## 2022-03-21 DIAGNOSIS — R31.0 GROSS HEMATURIA: ICD-10-CM

## 2022-03-21 RX ORDER — TAMSULOSIN HYDROCHLORIDE 0.4 MG/1
CAPSULE ORAL
Qty: 30 CAPSULE | Refills: 0 | Status: SHIPPED | OUTPATIENT
Start: 2022-03-21

## 2022-03-22 ENCOUNTER — OFFICE VISIT (OUTPATIENT)
Dept: PODIATRY | Age: 57
End: 2022-03-22
Payer: COMMERCIAL

## 2022-03-22 VITALS — BODY MASS INDEX: 36.45 KG/M2 | HEIGHT: 78 IN | WEIGHT: 315 LBS

## 2022-03-22 DIAGNOSIS — M79.675 PAIN OF TOES OF BOTH FEET: ICD-10-CM

## 2022-03-22 DIAGNOSIS — M79.674 PAIN OF TOES OF BOTH FEET: ICD-10-CM

## 2022-03-22 DIAGNOSIS — B35.1 ONYCHOMYCOSIS OF TOENAIL: Primary | ICD-10-CM

## 2022-03-22 PROCEDURE — 99999 PR OFFICE/OUTPT VISIT,PROCEDURE ONLY: CPT | Performed by: PODIATRIST

## 2022-03-22 PROCEDURE — 11721 DEBRIDE NAIL 6 OR MORE: CPT | Performed by: PODIATRIST

## 2022-03-22 ASSESSMENT — ENCOUNTER SYMPTOMS
NAUSEA: 0
COLOR CHANGE: 0
DIARRHEA: 0
SHORTNESS OF BREATH: 0
BACK PAIN: 0

## 2022-03-22 NOTE — PROGRESS NOTES
SUBJECTIVE: Jolynn Higgins is a 64 y.o. male who returns to the office with chief complaint of painful fungal toenails. Patient relates toe nails are thickened/difficult to trim as well as painful with ambulation and with shoe gear. Chief Complaint   Patient presents with    Nail Problem     b/l nail trim/ last seen Dr. Cezar Gilliland 2/23/2022    Bunions     right bunion, bigger      Review of Systems   Constitutional: Negative for activity change, appetite change, chills, diaphoresis, fatigue and fever. Respiratory: Negative for shortness of breath. Cardiovascular: Negative for leg swelling. Gastrointestinal: Negative for diarrhea and nausea. Endocrine: Negative for cold intolerance, heat intolerance and polyuria. Musculoskeletal: Positive for arthralgias. Negative for back pain, gait problem, joint swelling and myalgias. Skin: Negative for color change, pallor, rash and wound. Allergic/Immunologic: Negative for environmental allergies and food allergies. Neurological: Negative for dizziness, weakness, light-headedness and numbness. Hematological: Does not bruise/bleed easily. Psychiatric/Behavioral: Negative for behavioral problems, confusion and self-injury. The patient is not nervous/anxious. OBJECTIVE: Clinical evaluation of patient reveals nails 1,2,3,4,5 of the right foot and nails 1,2,3,4,5 of the left foot to present with thickness, elongation, discoloration, brittleness, and subungual debris. There was pain with palpation and debridement of the toenails of the bilateral feet. No open lesions noted to either foot today. Class A Findings (1 needed)   [] Non-traumatic amputation of foot or integral skeleton portion thereof. [] Q7.      Class B Findings (2 needed)   1. [] Absent posterior tibial pulse   2. [] Absent dorsalis pedis pulse   3.  [] Advanced trophic changes; three of the following are required:   ·         [] hair growth (decrease or absence)   ·         [] nail changes (thickening)   ·         [] pigmentary changes (discoloration)   ·         [] skin texture (thin, shiny)   ·         [] skin color (rubor or redness)   [] Q8.      Class C Findings (1 Class B, 2 Class C needed)   1. [] Claudication   2. [] Temperature changes   3. [] Edema   4. [] Paresthesia   5. [] Burning   [] Q9.     NO CLASS FINDINGS ARE NOTED    ASSESSMENT:    Diagnosis Orders   1. Onychomycosis of toenail  MA DEBRIDEMENT OF NAILS, 6 OR MORE   2. Pain of toes of both feet  MA DEBRIDEMENT OF NAILS, 6 OR MORE     PLAN: Toenails 1,2,3,4,5 of the right foot and 1,2,3,4,5 of the left foot were debrided in length and thickness using a nail nipper and a . Return in about 9 weeks (around 5/24/2022) for Painful fungal nails.    3/22/2022      Rozina Abdullahi DPM

## 2022-03-25 ENCOUNTER — OFFICE VISIT (OUTPATIENT)
Dept: UROLOGY | Age: 57
End: 2022-03-25
Payer: COMMERCIAL

## 2022-03-25 VITALS
BODY MASS INDEX: 36.45 KG/M2 | HEART RATE: 85 BPM | RESPIRATION RATE: 16 BRPM | WEIGHT: 315 LBS | TEMPERATURE: 98.1 F | SYSTOLIC BLOOD PRESSURE: 132 MMHG | DIASTOLIC BLOOD PRESSURE: 70 MMHG | HEIGHT: 78 IN

## 2022-03-25 DIAGNOSIS — R31.0 GROSS HEMATURIA: Primary | ICD-10-CM

## 2022-03-25 DIAGNOSIS — N52.9 ERECTILE DYSFUNCTION, UNSPECIFIED ERECTILE DYSFUNCTION TYPE: ICD-10-CM

## 2022-03-25 DIAGNOSIS — Z87.442 HISTORY OF KIDNEY STONES: ICD-10-CM

## 2022-03-25 LAB
BILIRUBIN, POC: ABNORMAL
BLOOD URINE, POC: ABNORMAL
CLARITY, POC: CLEAR
COLOR, POC: YELLOW
GLUCOSE URINE, POC: ABNORMAL
KETONES, POC: ABNORMAL
LEUKOCYTE EST, POC: ABNORMAL
NITRITE, POC: ABNORMAL
PH, POC: ABNORMAL
PROTEIN, POC: ABNORMAL
SPECIFIC GRAVITY, POC: ABNORMAL
UROBILINOGEN, POC: ABNORMAL

## 2022-03-25 PROCEDURE — 81002 URINALYSIS NONAUTO W/O SCOPE: CPT | Performed by: UROLOGY

## 2022-03-25 PROCEDURE — 99204 OFFICE O/P NEW MOD 45 MIN: CPT | Performed by: UROLOGY

## 2022-03-25 RX ORDER — SILDENAFIL 100 MG/1
100 TABLET, FILM COATED ORAL DAILY PRN
Qty: 20 TABLET | Refills: 3 | Status: SHIPPED | OUTPATIENT
Start: 2022-03-25

## 2022-03-25 ASSESSMENT — ENCOUNTER SYMPTOMS
ABDOMINAL PAIN: 1
EYE PAIN: 0
NAUSEA: 0
SHORTNESS OF BREATH: 0
COUGH: 0
DIARRHEA: 0
CONSTIPATION: 0
WHEEZING: 0
BACK PAIN: 0
VOMITING: 0

## 2022-03-25 NOTE — PROGRESS NOTES
[address] [address]  Dept: [phone]  Dept Fax: 17 Harsha Pedersen [facility] Urology Office Note - New patient    Patient:  Kevin English  YOB: 1965  Date: 3/25/2022    The patient is a 64 y.o. male who presents todayfor evaluation of the following problems:   Chief Complaint   Patient presents with    New Patient     kidney stones    referred by Eden Bergman MD.      HPI  He is here for hematuria. He saw blood in his urine about 4 weeks ago. He saw red blood. He did not see any clots. He had some right sided flank pain. He does have a h/o stones. 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 He is pain free. He is not a smoker. He has issues with ED as well. He has issues with both obtaining and maintaining erections. (Patient's old records have been requested, reviewed and summarized in today's note.)    Summary of old records: N/A    Additional History: N/A    Procedures Today: N/A    Last several PSA's:  Lab Results   Component Value Date    PSA 1.43 07/10/2020    PSA 1.81 06/25/2019    PSA 1.06 02/03/2018     Last total testosterone:  No results found for: TESTOSTERONE  Urinalysis today:  Results for POC orders placed in visit on 03/25/22   POCT Urinalysis no Micro   Result Value Ref Range    Color, UA Yellow     Clarity, UA Clear     Glucose, UA POC Neg     Bilirubin, UA      Ketones, UA      Spec Grav, UA      Blood, UA POC Trace     pH, UA      Protein, UA POC Neg     Urobilinogen, UA      Leukocytes, UA Pos     Nitrite, UA Neg        AUA Symptom Score (3/25/2022):   INCOMPLETE EMPTYING: How often have you had the sensation of not emptying your bladder?: Not at all  FREQUENCY: How often do you have to urinate less than every two hours?: Not at all  INTERMITTENCY: How often have you found you stopped and started again several times when you urinated?: Not at all  URGENCY: How often have you found it difficult to postpone urination?: Not at all  WEAK STREAM: How often have you had a weak urinary stream?: Not at all  STRAINING: How often have you had to strain to start  urination?: Not at all  NOCTURIA: How many times did you typically get up at night to uriniate?: NONE  TOTAL I-PSS SCORE[de-identified] 0       Last BUN and creatinine:  Lab Results   Component Value Date    BUN 14 02/23/2022     Lab Results   Component Value Date    CREATININE 1.22 (H) 02/23/2022       Additional Lab/Culture results: none    Imaging Reviewed during this Office Visit: renal ultrasound showed mild fullness of right renal pelvis. (results were independently reviewed by physician and radiology report verified)    PAST MEDICAL, FAMILY AND SOCIAL HISTORY:  Past Medical History:   Diagnosis Date    Atrial fibrillation [facility])     [address]    Cancer [facility])     right arm, at site of right arm surgical scar    H/O upper respiratory infection     Hyperlipidemia     Kidney stones     ALLEY on CPAP     Umbilical hernia     Varicose vein of leg     Wellness examination     Dr. Darcy Kinney.  [id number]     Past Surgical History:   Procedure Laterality Date    ARM SURGERY Right     x5, tendon injury    ARM SURGERY Left 01/26/2018    extensor tendon repair    CARDIOVERSION      x2, says likely to have another in february 2020    COLONOSCOPY     [address] [place] [address] Left as a child    HERNIA REPAIR N/A 12/30/2019    XI LAPAROSCOPIC ROBOTIC [facility] performed by Clare Ward MD at [address] Right     ureter, born with 2 right kidneys    TONSILLECTOMY      UMBILICAL HERNIA REPAIR  12/30/2019    LAPAROSCOPIC ROBOTIC UMBILICAL HERNIA REPAIR WITH MESH     VASECTOMY       Family History   Problem Relation Age of Onset    Coronary Art Dis Mother     Stroke Mother     Cancer Brother malignant melanoma of skin    Other Brother         seizures, bipolar, memory loss    Diabetes Maternal Uncle     Lung Cancer Maternal Grandfather     Coronary Art Dis Paternal Grandmother     Cancer Other         breast    Other Father         dementia    High Blood Pressure Sister     Asthma Sister     Arthritis Sister     High Blood Pressure Sister     Other Sister     Arthritis Sister      Outpatient Medications Marked as Taking for the 3/25/22 encounter (Office Visit) with Maryjane Castaneda MD   Medication Sig Dispense Refill    sildenafil (VIAGRA) 100 MG tablet Take 1 tablet by mouth daily as needed for Erectile Dysfunction 20 tablet 3    tamsulosin (FLOMAX) 0.4 MG capsule TAKE ONE CAPSULE BY MOUTH DAILY 30 capsule 0    allopurinol (ZYLOPRIM) 100 MG tablet Take 100mg daily for 4 weeks, then 200mg daily 180 tablet 3    potassium chloride (KLOR-CON M) 10 MEQ extended release tablet Take 1 tablet by mouth daily 90 tablet 3    triamterene-hydroCHLOROthiazide (MAXZIDE) 75-50 MG per tablet Take 1 tablet by mouth daily      dilTIAZem (DILTIAZEM CD) 120 MG extended release capsule Take 120 mg by mouth daily      flecainide (TAMBOCOR) 100 MG tablet Take 100 mg by mouth 2 times daily      apixaban (ELIQUIS) 5 MG TABS tablet Take 5 mg by mouth Stopping 12/27          Patient has no known allergies.   Social History     Tobacco Use   Smoking Status Current Some Day Smoker    Packs/day: 0.00    Years: 10.00    Pack years: 0.00    Types: Cigars   Smokeless Tobacco Never Used   Tobacco Comment    I have a few cigars per week      (If patient a smoker, smoking cessation counseling offered)   Social History     Substance and Sexual Activity   Alcohol Use Yes    Alcohol/week: 4.0 standard drinks    Types: 2 Cans of beer, 2 Standard drinks or equivalent per week    Comment: occ       REVIEW OF SYSTEMS:  Review of Systems    Physical Exam:    This a 64 y.o. male   Vitals:    03/25/22 1058   BP: 132/70 Pulse: 85   Resp: 16   Temp: 98.1 °F (36.7 °C)     Body mass index is 38.87 kg/m². Physical Exam  Constitutional: Patient in no acute distress. Neuro: Alert and oriented to person, place and time. Psych: Mood normal, affect normal  Lungs:Respiratory effort is normal  Cardiovascular: Warm & Pink  Abdomen: Soft, non-tender, non-distendedwith no CVA,  No flank tenderness,  Orhepatosplenomegaly   Lymphatics: No palpable lymphadenopathy. Bladder non-tender and not distended. Musculoskeletal: Normal gait and station  Penis normal and circumcised  Urethral meatus normal  Scrotal exam normal  Testicles normal bilaterally  Epididymis normal bilaterally      Assessment and Plan      1. Gross hematuria    2. History of kidney stones    3. Erectile dysfunction, unspecified erectile dysfunction type           Plan:        He had recent gross hematuria. Will obtain CT urogram.   Needs cysto as well. Will start Viagra. Prescriptions Ordered:  Orders Placed This Encounter   Medications    sildenafil (VIAGRA) 100 MG tablet     Sig: Take 1 tablet by mouth daily as needed for Erectile Dysfunction     Dispense:  20 tablet     Refill:  3      Orders Placed:  Orders Placed This Encounter   Procedures    POCT Urinalysis no Micro             Jennifer Parish MD    Agree with the ROS entered by the MA.

## 2022-03-25 NOTE — PROGRESS NOTES
Review of Systems   Constitutional: Negative for appetite change, chills, fatigue and fever. Eyes: Negative for pain and visual disturbance. Respiratory: Negative for cough, shortness of breath and wheezing. Cardiovascular: Negative for chest pain and leg swelling. Gastrointestinal: Positive for abdominal pain. Negative for constipation, diarrhea, nausea and vomiting. Genitourinary: Positive for dysuria, flank pain and hematuria. Negative for difficulty urinating, frequency, penile pain and testicular pain. Musculoskeletal: Negative for back pain and myalgias. Neurological: Negative for dizziness, tremors, weakness, light-headedness, numbness and headaches. Hematological: Negative for adenopathy. Does not bruise/bleed easily.    patient states he has 3 kidneys

## 2022-03-25 NOTE — LETTER
[address] [address]  Dept: [phone]  Dept Fax: [phone]        3/25/22    Patient: Shiraz Timmons  YOB: 1965    Dear Renetta Vega MD,    I had the pleasure of seeing one of your patients, Charlotte Sahu today in the office today. Below are the relevant portions of my assessment and plan of care. IMPRESSION:  1. Gross hematuria    2. History of kidney stones        PLAN:  He had recent gross hematuria. Will obtain CT urogram.   Needs cysto as well. Prescriptions Ordered:  No orders of the defined types were placed in this encounter. Orders Placed:  Orders Placed This Encounter   Procedures    POCT Urinalysis no Micro         Thank you for allowing me to participate in the care of this patient. I will keep you updated on this patient's follow up and I look forward to serving you and your patients again in the future.         Obed Gould MD

## 2022-04-04 ENCOUNTER — HOSPITAL ENCOUNTER (OUTPATIENT)
Dept: CT IMAGING | Age: 57
Discharge: HOME | End: 2022-04-06
Payer: COMMERCIAL

## 2022-04-04 DIAGNOSIS — R31.0 GROSS HEMATURIA: ICD-10-CM

## 2022-04-04 DIAGNOSIS — Z87.442 HISTORY OF KIDNEY STONES: ICD-10-CM

## 2022-04-04 PROCEDURE — 2580000003 HC RX 258: Performed by: UROLOGY

## 2022-04-04 PROCEDURE — 6360000004 HC RX CONTRAST MEDICATION: Performed by: UROLOGY

## 2022-04-04 PROCEDURE — 74178 CT ABD&PLV WO CNTR FLWD CNTR: CPT

## 2022-04-04 RX ORDER — SODIUM CHLORIDE 0.9 % (FLUSH) 0.9 %
10 SYRINGE (ML) INJECTION PRN
Status: DISCONTINUED | OUTPATIENT
Start: 2022-04-04 | End: 2022-04-07 | Stop reason: HOSPADM

## 2022-04-04 RX ORDER — 0.9 % SODIUM CHLORIDE 0.9 %
80 INTRAVENOUS SOLUTION INTRAVENOUS ONCE
Status: COMPLETED | OUTPATIENT
Start: 2022-04-04 | End: 2022-04-04

## 2022-04-04 RX ADMIN — SODIUM CHLORIDE 80 ML: 9 INJECTION, SOLUTION INTRAVENOUS at 08:20

## 2022-04-04 RX ADMIN — IOPAMIDOL 120 ML: 755 INJECTION, SOLUTION INTRAVENOUS at 08:23

## 2022-04-04 RX ADMIN — SODIUM CHLORIDE, PRESERVATIVE FREE 10 ML: 5 INJECTION INTRAVENOUS at 08:23

## 2022-04-29 ENCOUNTER — HOSPITAL ENCOUNTER (OUTPATIENT)
Age: 57
Setting detail: SPECIMEN
Discharge: HOME | End: 2022-04-29

## 2022-04-29 ENCOUNTER — OFFICE VISIT (OUTPATIENT)
Dept: UROLOGY | Age: 57
End: 2022-04-29
Payer: COMMERCIAL

## 2022-04-29 VITALS
WEIGHT: 315 LBS | RESPIRATION RATE: 16 BRPM | HEIGHT: 78 IN | HEART RATE: 70 BPM | DIASTOLIC BLOOD PRESSURE: 87 MMHG | TEMPERATURE: 96.6 F | SYSTOLIC BLOOD PRESSURE: 124 MMHG | BODY MASS INDEX: 36.45 KG/M2

## 2022-04-29 DIAGNOSIS — R31.0 GROSS HEMATURIA: Primary | ICD-10-CM

## 2022-04-29 DIAGNOSIS — R31.0 GROSS HEMATURIA: ICD-10-CM

## 2022-04-29 PROCEDURE — 52000 CYSTOURETHROSCOPY: CPT | Performed by: UROLOGY

## 2022-04-29 ASSESSMENT — ENCOUNTER SYMPTOMS
CONSTIPATION: 0
NAUSEA: 0
ABDOMINAL PAIN: 0
DIARRHEA: 0
WHEEZING: 0
VOMITING: 0
BACK PAIN: 0
COUGH: 0
EYE PAIN: 0
SHORTNESS OF BREATH: 0

## 2022-04-29 NOTE — PROGRESS NOTES
Cystoscopy Operative Note (4/29/22)  Surgeon: Maria Eugenia Hart MD  Anesthesia: Urethral 2% Xylocaine   Indications: Gross Hematuria  Position: Dorsal Lithotomy    Findings:   Risks and Benefits discussed with patient prior to procedure. The patient was prepped and draped in the usual sterile fashion. The flexible cystoscope was advanced through the urethra and into the bladder. The bladder was thoroughly inspected and the following was noted:    Residual Urine: mild  Urethra: normal appearing urethra with no masses, tenderness or lesions  Prostate: completely obstructing lateral lobes of prostate; median lobe present? yes. Bladder: No tumors or CIS noted. No bladder diverticulum. There was none trabeculation noted. Ureters: Clear efflux from both ureters. Orifices with normal configuration and location. The cystoscope was removed. The patient tolerated the procedure well. Agree with the ROS entered by the MA.

## 2022-05-02 LAB
CASE NUMBER:: NORMAL
SPECIMEN DESCRIPTION: NORMAL
SURGICAL PATHOLOGY REPORT: NORMAL

## 2022-06-06 ENCOUNTER — OFFICE VISIT (OUTPATIENT)
Dept: PODIATRY | Age: 57
End: 2022-06-06
Payer: COMMERCIAL

## 2022-06-06 VITALS — BODY MASS INDEX: 36.45 KG/M2 | WEIGHT: 315 LBS | HEIGHT: 78 IN

## 2022-06-06 DIAGNOSIS — M79.674 PAIN OF TOES OF BOTH FEET: ICD-10-CM

## 2022-06-06 DIAGNOSIS — B35.1 ONYCHOMYCOSIS OF TOENAIL: Primary | ICD-10-CM

## 2022-06-06 DIAGNOSIS — M79.675 PAIN OF TOES OF BOTH FEET: ICD-10-CM

## 2022-06-06 PROCEDURE — 99999 PR OFFICE/OUTPT VISIT,PROCEDURE ONLY: CPT | Performed by: PODIATRIST

## 2022-06-06 PROCEDURE — 11721 DEBRIDE NAIL 6 OR MORE: CPT | Performed by: PODIATRIST

## 2022-06-06 ASSESSMENT — ENCOUNTER SYMPTOMS
BACK PAIN: 0
NAUSEA: 0
COLOR CHANGE: 0
SHORTNESS OF BREATH: 0
DIARRHEA: 0

## 2022-06-06 NOTE — PROGRESS NOTES
SUBJECTIVE: Leigh Ann Rosa is a 64 y.o. male who returns to the office with chief complaint of painful fungal toenails. Patient relates toe nails are thickened/difficult to trim as well as painful with ambulation and with shoe gear. Chief Complaint   Patient presents with    Nail Problem     nail trim/last saw Asuncion Felty 2/23/2022     Review of Systems   Constitutional: Negative for activity change, appetite change, chills, diaphoresis, fatigue and fever. Respiratory: Negative for shortness of breath. Cardiovascular: Negative for leg swelling. Gastrointestinal: Negative for diarrhea and nausea. Endocrine: Negative for cold intolerance, heat intolerance and polyuria. Musculoskeletal: Positive for arthralgias. Negative for back pain, gait problem, joint swelling and myalgias. Skin: Negative for color change, pallor, rash and wound. Allergic/Immunologic: Negative for environmental allergies and food allergies. Neurological: Negative for dizziness, weakness, light-headedness and numbness. Hematological: Does not bruise/bleed easily. Psychiatric/Behavioral: Negative for behavioral problems, confusion and self-injury. The patient is not nervous/anxious. OBJECTIVE: Clinical evaluation of patient reveals nails 1,2,3,4,5 of the right foot and nails 1,2,3,4,5 of the left foot to present with thickness, elongation, discoloration, brittleness, and subungual debris. There was pain with palpation and debridement of the toenails of the bilateral feet. No open lesions noted to either foot today. Class A Findings (1 needed)   [] Non-traumatic amputation of foot or integral skeleton portion thereof. [] Q7.      Class B Findings (2 needed)   1. [] Absent posterior tibial pulse   2. [] Absent dorsalis pedis pulse   3.  [] Advanced trophic changes; three of the following are required:   ·         [] hair growth (decrease or absence)   ·         [] nail changes (thickening)   ·         [] pigmentary changes (discoloration)   ·         [] skin texture (thin, shiny)   ·         [] skin color (rubor or redness)   [] Q8.      Class C Findings (1 Class B, 2 Class C needed)   1. [] Claudication   2. [] Temperature changes   3. [] Edema   4. [] Paresthesia   5. [] Burning   [] Q9.     NO CLASS FINDINGS ARE NOTED    ASSESSMENT:    Diagnosis Orders   1. Onychomycosis of toenail  [place] DEBRIDEMENT OF NAILS, 6 OR MORE   2. Pain of toes of both feet  N[place] DEBRIDEMENT OF NAILS, 6 OR MORE     PLAN: Toenails 1,2,3,4,5 of the right foot and 1,2,3,4,5 of the left foot were debrided in length and thickness using a nail nipper and a . Return in about 9 weeks (around 8/8/2022) for Painful fungal nails.    6/6/2022      Bob Rendon DPM

## 2022-06-13 ENCOUNTER — OFFICE VISIT (OUTPATIENT)
Dept: NEUROLOGY | Age: 57
End: 2022-06-13
Payer: COMMERCIAL

## 2022-06-13 VITALS
HEIGHT: 78 IN | WEIGHT: 315 LBS | DIASTOLIC BLOOD PRESSURE: 80 MMHG | BODY MASS INDEX: 36.45 KG/M2 | SYSTOLIC BLOOD PRESSURE: 150 MMHG | HEART RATE: 62 BPM

## 2022-06-13 DIAGNOSIS — R20.0 NUMBNESS: Primary | ICD-10-CM

## 2022-06-13 PROCEDURE — 99214 OFFICE O/P EST MOD 30 MIN: CPT | Performed by: PSYCHIATRY & NEUROLOGY

## 2022-07-09 NOTE — PROGRESS NOTES
[address]   Outpatient Physical Therapy  [address] [facility]  Phone: [phone]  Fax: [phone]  Daily Progress Note    Date: 18    Patient Name: Alexandra Nation        MRN: [id number]  Account: [de-identified] : 1965      General Information:  Referring Practitioner: Lana Diaz  Referral Date : 09/10/18  Diagnosis: Left Frozen shoulder  Onset Date: 17  PT Insurance Information:  [employer]   Total # of Visits Approved: 10 (32 units )  Total # of Visits to Date: 7  Plan of Care/Certification Expiration Date: 10/10/18  No Show: 0  Canceled Appointment: 1  Progress Note Counter: 6/10 visits     Subjective:  Subjective: No new complaints. Patient notes doctors determined they will not perform surgery until patient has full tear on Rotator cuff. Pain:  Patient Currently in Pain: Yes  Pain Assessment: 0-10  Pain Level: 6  Pain Type: Chronic pain  Pain Location: Shoulder  Pain Orientation: Left; Anterior  Pain Descriptors: Aching  Pain Frequency: Intermittent  Pain Onset: On-going  Clinical Progression: Gradually worsening  Response to Pain Intervention: None       Objective:  Exercise 1: Review HEP Table slides 10 x   Exercise 2: Review HEP Codmans CCW/CW F/B S/S  Exercise 3: Pulley 7 min Flex/ADB/ IR  Exercise 4: PROM 30 min L UE IR-ER- Flex- Abd  Exercise 5: UBC 6 min 3/3 min      Moist heat: 15 min Left Shoulder in sitting with pillow under elbow. Assessment: Body structures, Functions, Activity limitations: Decreased functional mobility ; Decreased ADL status; Decreased ROM; Decreased strength;Decreased high-level IADLs  Assessment: PROM has increased end range motions with mild - mod pain. The use of Ice today for pain releif.    Treatment Diagnosis: Left Shoulder pain   Prognosis: Good  REQUIRES PT FOLLOW UP: Yes  Discharge Recommendations: Home independently       Plan:  Plan: Continue with current plan    Therapy Time:  Time In: 0800  Time Out:
<-- Click to add NO pertinent Past Medical History

## 2022-08-15 ENCOUNTER — OFFICE VISIT (OUTPATIENT)
Dept: PODIATRY | Age: 57
End: 2022-08-15
Payer: COMMERCIAL

## 2022-08-15 VITALS — HEIGHT: 78 IN | BODY MASS INDEX: 36.45 KG/M2 | WEIGHT: 315 LBS

## 2022-08-15 DIAGNOSIS — B35.1 ONYCHOMYCOSIS OF TOENAIL: Primary | ICD-10-CM

## 2022-08-15 DIAGNOSIS — M79.674 PAIN OF TOES OF BOTH FEET: ICD-10-CM

## 2022-08-15 DIAGNOSIS — M79.675 PAIN OF TOES OF BOTH FEET: ICD-10-CM

## 2022-08-15 PROCEDURE — 11721 DEBRIDE NAIL 6 OR MORE: CPT | Performed by: PODIATRIST

## 2022-08-15 PROCEDURE — 99999 PR OFFICE/OUTPT VISIT,PROCEDURE ONLY: CPT | Performed by: PODIATRIST

## 2022-08-15 ASSESSMENT — ENCOUNTER SYMPTOMS
COLOR CHANGE: 0
NAUSEA: 0
BACK PAIN: 0
SHORTNESS OF BREATH: 0
DIARRHEA: 0

## 2022-08-15 NOTE — PROGRESS NOTES
SUBJECTIVE: Kofi Martin is a 64 y.o. male who returns to the office with chief complaint of painful fungal toenails. Patient relates toe nails are thickened/difficult to trim as well as painful with ambulation and with shoe gear. Chief Complaint   Patient presents with    Nail Problem     Nail trim/last saw Micheal Adams 2/23/22     Review of Systems   Constitutional:  Negative for activity change, appetite change, chills, diaphoresis, fatigue and fever. Respiratory:  Negative for shortness of breath. Cardiovascular:  Negative for leg swelling. Gastrointestinal:  Negative for diarrhea and nausea. Endocrine: Negative for cold intolerance, heat intolerance and polyuria. Musculoskeletal:  Positive for arthralgias. Negative for back pain, gait problem, joint swelling and myalgias. Skin:  Negative for color change, pallor, rash and wound. Allergic/Immunologic: Negative for environmental allergies and food allergies. Neurological:  Negative for dizziness, weakness, light-headedness and numbness. Hematological:  Does not bruise/bleed easily. Psychiatric/Behavioral:  Negative for behavioral problems, confusion and self-injury. The patient is not nervous/anxious. OBJECTIVE: Clinical evaluation of patient reveals nails 1,2,3,4,5 of the right foot and nails 1,2,3,4,5 of the left foot to present with thickness, elongation, discoloration, brittleness, and subungual debris. There was pain with palpation and debridement of the toenails of the bilateral feet. No open lesions noted to either foot today. Class A Findings (1 needed)   [] Non-traumatic amputation of foot or integral skeleton portion thereof. [] Q7.      Class B Findings (2 needed)   1. [] Absent posterior tibial pulse   2. [] Absent dorsalis pedis pulse   3.  [] Advanced trophic changes; three of the following are required:   ·         [] hair growth (decrease or absence)   ·         [] nail changes (thickening)   ·         [] pigmentary changes (discoloration)   ·         [] skin texture (thin, shiny)   ·         [] skin color (rubor or redness)   [] Q8.      Class C Findings (1 Class B, 2 Class C needed)   1. [] Claudication   2. [] Temperature changes   3. [] Edema   4. [] Paresthesia   5. [] Burning   [] Q9.     NO CLASS FINDINGS ARE NOTED    ASSESSMENT:    Diagnosis Orders   1. Onychomycosis of toenail  DC DEBRIDEMENT OF NAILS, 6 OR MORE      2. Pain of toes of both feet  DC DEBRIDEMENT OF NAILS, 6 OR MORE        PLAN: Toenails 1,2,3,4,5 of the right foot and 1,2,3,4,5 of the left foot were debrided in length and thickness using a nail nipper and a . Return in about 9 weeks (around 10/17/2022) for Painful fungal nails.    8/15/2022      Verline Nageotte, DPM

## 2022-10-12 ENCOUNTER — HOSPITAL ENCOUNTER (OUTPATIENT)
Age: 57
Discharge: HOME | End: 2022-10-12
Payer: COMMERCIAL

## 2022-10-12 LAB
ALBUMIN SERPL-MCNC: 4.5 G/DL (ref 3.5–5.2)
ALP BLD-CCNC: 90 U/L (ref 40–129)
ALT SERPL-CCNC: 38 U/L (ref 5–41)
ANION GAP SERPL CALCULATED.3IONS-SCNC: 8 MMOL/L (ref 9–17)
AST SERPL-CCNC: 24 U/L
BILIRUB SERPL-MCNC: 0.5 MG/DL (ref 0.3–1.2)
BUN BLDV-MCNC: 15 MG/DL (ref 6–20)
CALCIUM SERPL-MCNC: 9.8 MG/DL (ref 8.6–10.4)
CHLORIDE BLD-SCNC: 101 MMOL/L (ref 98–107)
CO2: 32 MMOL/L (ref 20–31)
CREAT SERPL-MCNC: 1.1 MG/DL (ref 0.7–1.2)
GFR SERPL CREATININE-BSD FRML MDRD: >60 ML/MIN/1.73M2
GLUCOSE BLD-MCNC: 103 MG/DL (ref 70–99)
HCT VFR BLD CALC: 45.1 % (ref 41–53)
HEMOGLOBIN: 15.4 G/DL (ref 13.5–17.5)
MAGNESIUM: 2.1 MG/DL (ref 1.6–2.6)
MCH RBC QN AUTO: 30.1 PG (ref 26–34)
MCHC RBC AUTO-ENTMCNC: 34.1 G/DL (ref 31–37)
MCV RBC AUTO: 88.2 FL (ref 80–100)
PDW BLD-RTO: 15.1 % (ref 11.5–14.9)
PLATELET # BLD: 262 K/UL (ref 150–450)
PMV BLD AUTO: 6.8 FL (ref 6–12)
POTASSIUM SERPL-SCNC: 3.9 MMOL/L (ref 3.7–5.3)
RBC # BLD: 5.11 M/UL (ref 4.5–5.9)
SODIUM BLD-SCNC: 141 MMOL/L (ref 135–144)
TOTAL PROTEIN: 7.3 G/DL (ref 6.4–8.3)
WBC # BLD: 8.1 K/UL (ref 3.5–11)

## 2022-10-12 PROCEDURE — 36415 COLL VENOUS BLD VENIPUNCTURE: CPT

## 2022-10-12 PROCEDURE — 83735 ASSAY OF MAGNESIUM: CPT

## 2022-10-12 PROCEDURE — 85027 COMPLETE CBC AUTOMATED: CPT

## 2022-10-12 PROCEDURE — 80053 COMPREHEN METABOLIC PANEL: CPT

## 2022-10-24 ENCOUNTER — OFFICE VISIT (OUTPATIENT)
Dept: PODIATRY | Age: 57
End: 2022-10-24
Payer: COMMERCIAL

## 2022-10-24 VITALS — WEIGHT: 315 LBS | HEIGHT: 78 IN | BODY MASS INDEX: 36.45 KG/M2

## 2022-10-24 DIAGNOSIS — M79.675 PAIN OF TOES OF BOTH FEET: ICD-10-CM

## 2022-10-24 DIAGNOSIS — M79.674 PAIN OF TOES OF BOTH FEET: ICD-10-CM

## 2022-10-24 DIAGNOSIS — B35.1 ONYCHOMYCOSIS OF TOENAIL: Primary | ICD-10-CM

## 2022-10-24 PROCEDURE — 11721 DEBRIDE NAIL 6 OR MORE: CPT | Performed by: PODIATRIST

## 2022-10-24 PROCEDURE — 99999 PR OFFICE/OUTPT VISIT,PROCEDURE ONLY: CPT | Performed by: PODIATRIST

## 2022-10-25 ASSESSMENT — ENCOUNTER SYMPTOMS
SHORTNESS OF BREATH: 0
NAUSEA: 0
BACK PAIN: 0
COLOR CHANGE: 0
DIARRHEA: 0

## 2022-10-25 NOTE — PROGRESS NOTES
SUBJECTIVE: Gerson Awan is a 64 y.o. male who returns to the office with chief complaint of painful fungal toenails. Patient relates toe nails are thickened/difficult to trim as well as painful with ambulation and with shoe gear. Chief Complaint   Patient presents with    Nail Problem     Nail trim/last saw Tung Bashir 2/23/22     Review of Systems   Constitutional:  Negative for activity change, appetite change, chills, diaphoresis, fatigue and fever. Respiratory:  Negative for shortness of breath. Cardiovascular:  Negative for leg swelling. Gastrointestinal:  Negative for diarrhea and nausea. Endocrine: Negative for cold intolerance, heat intolerance and polyuria. Musculoskeletal:  Positive for arthralgias. Negative for back pain, gait problem, joint swelling and myalgias. Skin:  Negative for color change, pallor, rash and wound. Allergic/Immunologic: Negative for environmental allergies and food allergies. Neurological:  Negative for dizziness, weakness, light-headedness and numbness. Hematological:  Does not bruise/bleed easily. Psychiatric/Behavioral:  Negative for behavioral problems, confusion and self-injury. The patient is not nervous/anxious. OBJECTIVE: Clinical evaluation of patient reveals nails 1,2,3,4,5 of the right foot and nails 1,2,3,4,5 of the left foot to present with thickness, elongation, discoloration, brittleness, and subungual debris. There was pain with palpation and debridement of the toenails of the bilateral feet. No open lesions noted to either foot today. Class A Findings (1 needed)   [] Non-traumatic amputation of foot or integral skeleton portion thereof. [] Q7.      Class B Findings (2 needed)   1. [] Absent posterior tibial pulse   2. [] Absent dorsalis pedis pulse   3.  [] Advanced trophic changes; three of the following are required:   ·         [] hair growth (decrease or absence)   ·         [] nail changes (thickening)   ·         [] pigmentary changes (discoloration)   ·         [] skin texture (thin, shiny)   ·         [] skin color (rubor or redness)   [] Q8.      Class C Findings (1 Class B, 2 Class C needed)   1. [] Claudication   2. [] Temperature changes   3. [] Edema   4. [] Paresthesia   5. [] Burning   [] Q9.     NO CLASS FINDINGS ARE NOTED    ASSESSMENT:    Diagnosis Orders   1. Onychomycosis of toenail  PA DEBRIDEMENT OF NAILS, 6 OR MORE      2. Pain of toes of both feet  PA DEBRIDEMENT OF NAILS, 6 OR MORE        PLAN: Toenails 1,2,3,4,5 of the right foot and 1,2,3,4,5 of the left foot were debrided in length and thickness using a nail nipper and a . Return in about 9 weeks (around 12/26/2022) for Painful fungal nails.    10/24/2022      Diana Chung DPM

## 2022-11-01 ENCOUNTER — OFFICE VISIT (OUTPATIENT)
Dept: UROLOGY | Age: 57
End: 2022-11-01
Payer: COMMERCIAL

## 2022-11-01 VITALS — WEIGHT: 315 LBS | BODY MASS INDEX: 36.45 KG/M2 | TEMPERATURE: 98 F | HEIGHT: 78 IN

## 2022-11-01 DIAGNOSIS — N20.0 KIDNEY STONE: ICD-10-CM

## 2022-11-01 DIAGNOSIS — Z87.448 HISTORY OF HEMATURIA: Primary | ICD-10-CM

## 2022-11-01 PROCEDURE — 99212 OFFICE O/P EST SF 10 MIN: CPT | Performed by: UROLOGY

## 2022-11-01 ASSESSMENT — ENCOUNTER SYMPTOMS
DIARRHEA: 0
CONSTIPATION: 0
EYE REDNESS: 0
BACK PAIN: 0
SHORTNESS OF BREATH: 0
WHEEZING: 0
COUGH: 0
VOMITING: 0
EYE PAIN: 0
NAUSEA: 0
ABDOMINAL PAIN: 0

## 2022-11-01 NOTE — LETTER
[address] [address]  Dept: [phone]  Dept Fax: [phone]        11/1/22    Patient: Fadi Orona  YOB: 1965    Dear Carissa Montenegro MD,    I had the pleasure of seeing one of your patients, Robby Srinivasan today in the office today. Below are the relevant portions of my assessment and plan of care. IMPRESSION:  1. History of hematuria    2. Kidney stone        PLAN:  Doing well. F/U in 6 months with a KUB. Return in about 6 months (around 5/1/2023) for KUB. Prescriptions Ordered:  No orders of the defined types were placed in this encounter. Orders Placed:  Orders Placed This Encounter   Procedures    XR ABDOMEN (KUB) (SINGLE AP VIEW)     Standing Status:   Future     Standing Expiration Date:   11/1/2023          Thank you for allowing me to participate in the care of this patient. I will keep you updated on this patient's follow up and I look forward to serving you and your patients again in the future.         Louis Whitney MD

## 2022-11-01 NOTE — PROGRESS NOTES
[address] [address]  Dept: [address] Urology Office Note - Established    Patient:  Fadi Orona  YOB: 1965  Date: 11/1/2022    The patient is a 64 y.o. male who presents todayfor evaluation of the following problems:   Chief Complaint   Patient presents with    Hematuria     6 month check up        HPI  Here in follow up for hematuria. He had stones previously. No recent stone episodes. No hematuria. Summary of old records: N/A    Additional History: N/A    Procedures Today: N/A    Urinalysis today:  No results found for this visit on 11/01/22. Last several PSA's:  Lab Results   Component Value Date    PSA 1.43 07/10/2020    PSA 1.81 06/25/2019    PSA 1.06 02/03/2018     Last total testosterone:  No results found for: TESTOSTERONE    AUA Symptom Score (11/1/2022): Last BUN and creatinine:  Lab Results   Component Value Date    BUN 15 10/12/2022     Lab Results   Component Value Date    CREATININE 1.10 10/12/2022       Additional Lab/Culture results: none    Imaging Reviewed during this Office Visit: noneq  (results were independently reviewed by physician and radiology report verified)    PAST MEDICAL, FAMILY AND SOCIAL HISTORY UPDATE:  Past Medical History:   Diagnosis Date    Atrial fibrillation ([address]     [address]    Cancer [facility])     right arm, at site of right arm surgical scar    H/O upper respiratory infection     Hearing loss     Hyperlipidemia     Hypertension     Kidney stones     Neuropathy 2018    ALLEY on CPAP     Umbilical hernia     Varicose vein of leg     Wellness examination     Dr. Ivette Rooney.  [id number]     Past Surgical History:   Procedure Laterality Date    ARM SURGERY Right     x5, tendon injury    ARM SURGERY Left 01/26/2018    extensor tendon repair    CARDIOVERSION      x2, says likely to have another in february 2020    [address] Left as a child    HERNIA REPAIR N/A 12/30/2019    XI LAPAROSCOPIC ROBOTIC [address] performed by Asael Bergman MD at [address] Right     ureter, born with 2 right kidneys    TONSILLECTOMY      UMBILICAL HERNIA REPAIR  12/30/2019    LAPAROSCOPIC ROBOTIC UMBILICAL HERNIA REPAIR WITH MESH     VASECTOMY       Family History   Problem Relation Age of Onset    Coronary Art Dis Mother     Stroke Mother     Cancer Brother         malignant melanoma of skin    Other Brother         seizures, bipolar, memory loss    Dementia Brother     Diabetes Maternal Uncle     Lung Cancer Maternal Grandfather     Coronary Art Dis Paternal Grandmother     Alzheimer's Disease Paternal Grandmother     Dementia Paternal Grandmother     Cancer Other         breast    Other Father         dementia    Alzheimer's Disease Father     Dementia Father     High Blood Pressure Sister     Asthma Sister     Arthritis Sister     High Blood Pressure Sister     Other Sister     Arthritis Sister      Outpatient Medications Marked as Taking for the 11/1/22 encounter (Office Visit) with Robert Santiago MD   Medication Sig Dispense Refill    sildenafil (VIAGRA) 100 MG tablet Take 1 tablet by mouth daily as needed for Erectile Dysfunction 20 tablet 3    tamsulosin (FLOMAX) 0.4 MG capsule TAKE ONE CAPSULE BY MOUTH DAILY 30 capsule 0    allopurinol (ZYLOPRIM) 100 MG tablet Take 100mg daily for 4 weeks, then 200mg daily 180 tablet 3    triamterene-hydroCHLOROthiazide (MAXZIDE) 75-50 MG per tablet Take 1 tablet by mouth daily      dilTIAZem (CARDIZEM CD) 120 MG extended release capsule Take 120 mg by mouth daily      flecainide (TAMBOCOR) 100 MG tablet Take 100 mg by mouth 2 times daily      apixaban (ELIQUIS) 5 MG TABS tablet Take 5 mg by mouth Stopping 12/27         Patient has no known allergies.   Social History Tobacco Use   Smoking Status Some Days    Packs/day: 0.00    Years: 10.00    Pack years: 0.00    Types: Cigars, Cigarettes   Smokeless Tobacco Never   Tobacco Comments    I have a few cigars per week     (Ifpatient a smoker, smoking cessation counseling offered)    Social History     Substance and Sexual Activity   Alcohol Use Yes    Alcohol/week: 4.0 standard drinks    Types: 2 Cans of beer, 2 Standard drinks or equivalent per week    Comment: occ       REVIEW OF SYSTEMS:  Review of Systems    Physical Exam:      Vitals:    11/01/22 0845   Temp: 98 °F (36.7 °C)     Body mass index is 39.43 kg/m². Patient is a 64 y.o. male in no acute distress and alert and oriented to person, place and time. Physical Exam  Constitutional: Patient in no acute distress. Neuro: Alert and oriented to person, place and time. Psych: Mood normal, affect normal  Lungs: Respiratory effort is normal  Cardiovascular: Warm & Pink  Abdomen: Soft, non-tender, non-distended with no CVA,  No flank tenderness,  Or hepatosplenomegaly   Lymphatics: No palpablelymphadenopathy. Bladder non-tender and not distended. Musculoskeletal: Normal gait and station    Assessment and Plan      1. History of hematuria    2. Kidney stone           Plan:         Doing well. F/U in 6 months with a KUB. Return in about 6 months (around 5/1/2023) for KUB. Prescriptions Ordered:  No orders of the defined types were placed in this encounter. Orders Placed:  Orders Placed This Encounter   Procedures    XR ABDOMEN (KUB) (SINGLE AP VIEW)     Standing Status:   Future     Standing Expiration Date:   11/1/2023             Mindi Rasheed MD    Agree with the ROS entered by the MA.

## 2023-01-09 ENCOUNTER — OFFICE VISIT (OUTPATIENT)
Dept: PODIATRY | Age: 58
End: 2023-01-09
Payer: COMMERCIAL

## 2023-01-09 VITALS — BODY MASS INDEX: 36.45 KG/M2 | WEIGHT: 315 LBS | HEIGHT: 78 IN

## 2023-01-09 DIAGNOSIS — M79.674 PAIN OF TOES OF BOTH FEET: ICD-10-CM

## 2023-01-09 DIAGNOSIS — M79.675 PAIN OF TOES OF BOTH FEET: ICD-10-CM

## 2023-01-09 DIAGNOSIS — B35.1 ONYCHOMYCOSIS OF TOENAIL: Primary | ICD-10-CM

## 2023-01-09 PROCEDURE — 11721 DEBRIDE NAIL 6 OR MORE: CPT | Performed by: PODIATRIST

## 2023-01-09 PROCEDURE — 99999 PR OFFICE/OUTPT VISIT,PROCEDURE ONLY: CPT | Performed by: PODIATRIST

## 2023-01-09 ASSESSMENT — ENCOUNTER SYMPTOMS
BACK PAIN: 0
SHORTNESS OF BREATH: 0
COLOR CHANGE: 0
NAUSEA: 0
DIARRHEA: 0

## 2023-01-09 NOTE — PROGRESS NOTES
SUBJECTIVE: Elena Stanton is a 62 y.o. male who returns to the office with chief complaint of painful fungal toenails. Patient relates toe nails are thickened/difficult to trim as well as painful with ambulation and with shoe gear. Chief Complaint   Patient presents with    Nail Problem     B/l nail trim, last seen George Conner MD 2/23/22     Review of Systems   Constitutional:  Negative for activity change, appetite change, chills, diaphoresis, fatigue and fever. Respiratory:  Negative for shortness of breath. Cardiovascular:  Negative for leg swelling. Gastrointestinal:  Negative for diarrhea and nausea. Endocrine: Negative for cold intolerance, heat intolerance and polyuria. Musculoskeletal:  Positive for arthralgias. Negative for back pain, gait problem, joint swelling and myalgias. Skin:  Negative for color change, pallor, rash and wound. Allergic/Immunologic: Negative for environmental allergies and food allergies. Neurological:  Negative for dizziness, weakness, light-headedness and numbness. Hematological:  Does not bruise/bleed easily. Psychiatric/Behavioral:  Negative for behavioral problems, confusion and self-injury. The patient is not nervous/anxious. OBJECTIVE: Clinical evaluation of patient reveals nails 1,2,3,4,5 of the right foot and nails 1,2,3,4,5 of the left foot to present with thickness, elongation, discoloration, brittleness, and subungual debris. There was pain with palpation and debridement of the toenails of the bilateral feet. No open lesions noted to either foot today. Class A Findings (1 needed)   [] Non-traumatic amputation of foot or integral skeleton portion thereof. [] Q7.      Class B Findings (2 needed)   1. [] Absent posterior tibial pulse   2. [] Absent dorsalis pedis pulse   3.  [] Advanced trophic changes; three of the following are required:   ·         [] hair growth (decrease or absence)   ·         [] nail changes (thickening)   ·         [] pigmentary changes (discoloration)   ·         [] skin texture (thin, shiny)   ·         [] skin color (rubor or redness)   [] Q8.      Class C Findings (1 Class B, 2 Class C needed)   1. [] Claudication   2. [] Temperature changes   3. [] Edema   4. [] Paresthesia   5. [] Burning   [] Q9.     NO CLASS FINDINGS ARE NOTED    ASSESSMENT:    Diagnosis Orders   1. Onychomycosis of toenail  IN DEBRIDEMENT NAIL ANY METHOD 6/>      2. Pain of toes of both feet  IN DEBRIDEMENT NAIL ANY METHOD 6/>        PLAN: Toenails 1,2,3,4,5 of the right foot and 1,2,3,4,5 of the left foot were debrided in length and thickness using a nail nipper and a . Return in about 9 weeks (around 3/13/2023) for Painful fungal nails.    1/9/2023      Zully Márquez DPM

## 2023-02-27 RX ORDER — ALLOPURINOL 100 MG/1
TABLET ORAL
Qty: 180 TABLET | Refills: 3 | OUTPATIENT
Start: 2023-02-27

## 2023-03-13 ENCOUNTER — OFFICE VISIT (OUTPATIENT)
Dept: PODIATRY | Age: 58
End: 2023-03-13
Payer: COMMERCIAL

## 2023-03-13 VITALS — HEIGHT: 78 IN | WEIGHT: 315 LBS | BODY MASS INDEX: 36.45 KG/M2

## 2023-03-13 DIAGNOSIS — M79.675 PAIN OF TOES OF BOTH FEET: ICD-10-CM

## 2023-03-13 DIAGNOSIS — M79.674 PAIN OF TOES OF BOTH FEET: ICD-10-CM

## 2023-03-13 DIAGNOSIS — B35.1 ONYCHOMYCOSIS OF TOENAIL: Primary | ICD-10-CM

## 2023-03-13 PROCEDURE — 99999 PR OFFICE/OUTPT VISIT,PROCEDURE ONLY: CPT | Performed by: PODIATRIST

## 2023-03-13 PROCEDURE — 11721 DEBRIDE NAIL 6 OR MORE: CPT | Performed by: PODIATRIST

## 2023-03-15 ENCOUNTER — OFFICE VISIT (OUTPATIENT)
Dept: NEUROLOGY | Age: 58
End: 2023-03-15
Payer: COMMERCIAL

## 2023-03-15 VITALS
WEIGHT: 315 LBS | BODY MASS INDEX: 36.45 KG/M2 | SYSTOLIC BLOOD PRESSURE: 147 MMHG | HEART RATE: 83 BPM | DIASTOLIC BLOOD PRESSURE: 87 MMHG | HEIGHT: 78 IN

## 2023-03-15 DIAGNOSIS — R20.0 NUMBNESS: Primary | ICD-10-CM

## 2023-03-15 DIAGNOSIS — R20.2 PARESTHESIA: ICD-10-CM

## 2023-03-15 PROCEDURE — 99214 OFFICE O/P EST MOD 30 MIN: CPT | Performed by: PSYCHIATRY & NEUROLOGY

## 2023-03-15 PROCEDURE — 3078F DIAST BP <80 MM HG: CPT | Performed by: PSYCHIATRY & NEUROLOGY

## 2023-03-15 PROCEDURE — 3074F SYST BP LT 130 MM HG: CPT | Performed by: PSYCHIATRY & NEUROLOGY

## 2023-03-15 ASSESSMENT — ENCOUNTER SYMPTOMS
BACK PAIN: 0
DIARRHEA: 0
SHORTNESS OF BREATH: 0
NAUSEA: 0
COLOR CHANGE: 0

## 2023-03-15 NOTE — PROGRESS NOTES
SUBJECTIVE: Love Smith is a 62 y.o. male who returns to the office with chief complaint of painful fungal toenails. Patient relates toe nails are thickened/difficult to trim as well as painful with ambulation and with shoe gear. Chief Complaint   Patient presents with    Nail Problem     NAIL TRIM/LAST SAW DR.JOHN COLLAZO 2/23/22     Review of Systems   Constitutional:  Negative for activity change, appetite change, chills, diaphoresis, fatigue and fever. Respiratory:  Negative for shortness of breath. Cardiovascular:  Negative for leg swelling. Gastrointestinal:  Negative for diarrhea and nausea. Endocrine: Negative for cold intolerance, heat intolerance and polyuria. Musculoskeletal:  Positive for arthralgias. Negative for back pain, gait problem, joint swelling and myalgias. Skin:  Negative for color change, pallor, rash and wound. Allergic/Immunologic: Negative for environmental allergies and food allergies. Neurological:  Negative for dizziness, weakness, light-headedness and numbness. Hematological:  Does not bruise/bleed easily. Psychiatric/Behavioral:  Negative for behavioral problems, confusion and self-injury. The patient is not nervous/anxious. OBJECTIVE: Clinical evaluation of patient reveals nails 1,2,3,4,5 of the right foot and nails 1,2,3,4,5 of the left foot to present with thickness, elongation, discoloration, brittleness, and subungual debris. There was pain with palpation and debridement of the toenails of the bilateral feet. No open lesions noted to either foot today. Class A Findings (1 needed)   [] Non-traumatic amputation of foot or integral skeleton portion thereof. [] Q7.      Class B Findings (2 needed)   1. [] Absent posterior tibial pulse   2. [] Absent dorsalis pedis pulse   3.  [] Advanced trophic changes; three of the following are required:   ·         [] hair growth (decrease or absence)   ·         [] nail changes (thickening)   ·         [] pigmentary changes (discoloration)   ·         [] skin texture (thin, shiny)   ·         [] skin color (rubor or redness)   [] Q8.      Class C Findings (1 Class B, 2 Class C needed)   1. [] Claudication   2. [] Temperature changes   3. [] Edema   4. [] Paresthesia   5. [] Burning   [] Q9.     NO CLASS FINDINGS ARE NOTED    ASSESSMENT:    Diagnosis Orders   1. Onychomycosis of toenail  NE DEBRIDEMENT NAIL ANY METHOD 6/>      2. Pain of toes of both feet  NE DEBRIDEMENT NAIL ANY METHOD 6/>        PLAN: Toenails 1,2,3,4,5 of the right foot and 1,2,3,4,5 of the left foot were debrided in length and thickness using a nail nipper and a . Return in about 9 weeks (around 5/15/2023) for Painful fungal nails.    3/13/2023      Kenneth Murrell DPM

## 2023-03-15 NOTE — PROGRESS NOTES
[facility], [address], [address] [place] [address]  Ph: [phone] or [phone]  FAX: [phone]    Chief Complaint: Neuropathy     Dear Honey Boone MD     I had the pleasure of seeing your patient today in neurology consultation for his symptoms. As you would recall Laura Gleason is a 62 y.o. male. The patient presents to the office for continued care regarding numbness in his lower extremities. The paresthesia was first observed approximately 3 years ago following his second Shingles vaccination. The patient reports that this numbness is not associated with any pain and extends from the midline of his calf to his feet bilaterally. Since the numbness was observed, it has steadily worsened overtime. He has tried multiple pain medications such as Gabapentin, Cymbalta and Topamax with no relief. Patient reports difficulty walking down steps. Denies falls and weakness. He reports difficulty with balance especially when his eyes are closed. The patient denies any family history of numbness. Patient reports that he has not started Lyrica 25 mg TID. He has a history of kidney stones. Patient reports that he was taking allopurinol for an extended period of time prior to onset of paresthesia. He is not diabetic. Previously on 6/13/22, the patient reports that his symptoms are relatively unchanged since the last visit, and he denies any worsening of his symptoms. Patient continues to have ongoing numbness in his lower extremities from the knee down along with associated unsteady gait which can make ambulation difficult, especially at night. He has undergone physical therapy to improve balance. Denies any weakness. Overall, patient reports that his symptoms are stable. He has a history of varicose veins. Today, 3/15/23, the patient presents for a follow up visit.  The patient reports that he fell in January 2023 and has been following with his chiropractor for

## 2023-04-04 ENCOUNTER — HOSPITAL ENCOUNTER (OUTPATIENT)
Age: 58
Discharge: HOME | End: 2023-04-06
Payer: COMMERCIAL

## 2023-04-04 ENCOUNTER — HOSPITAL ENCOUNTER (OUTPATIENT)
Age: 58
Discharge: HOME | End: 2023-04-04
Payer: COMMERCIAL

## 2023-04-04 ENCOUNTER — HOSPITAL ENCOUNTER (OUTPATIENT)
Dept: GENERAL RADIOLOGY | Age: 58
Discharge: HOME | End: 2023-04-06
Payer: COMMERCIAL

## 2023-04-04 DIAGNOSIS — N20.0 KIDNEY STONE: ICD-10-CM

## 2023-04-04 LAB
CHOLEST SERPL-MCNC: 216 MG/DL
CHOLESTEROL/HDL RATIO: 4.3
HDLC SERPL-MCNC: 50 MG/DL
LDLC SERPL CALC-MCNC: 151 MG/DL (ref 0–130)
TRIGL SERPL-MCNC: 74 MG/DL

## 2023-04-04 PROCEDURE — 74018 RADEX ABDOMEN 1 VIEW: CPT

## 2023-04-04 PROCEDURE — 80061 LIPID PANEL: CPT

## 2023-04-04 PROCEDURE — 36415 COLL VENOUS BLD VENIPUNCTURE: CPT

## 2023-05-05 ENCOUNTER — OFFICE VISIT (OUTPATIENT)
Dept: UROLOGY | Age: 58
End: 2023-05-05
Payer: COMMERCIAL

## 2023-05-05 VITALS
TEMPERATURE: 97.5 F | BODY MASS INDEX: 36.45 KG/M2 | DIASTOLIC BLOOD PRESSURE: 80 MMHG | HEIGHT: 78 IN | WEIGHT: 315 LBS | SYSTOLIC BLOOD PRESSURE: 142 MMHG

## 2023-05-05 DIAGNOSIS — N20.0 KIDNEY STONE: ICD-10-CM

## 2023-05-05 DIAGNOSIS — Z87.448 HISTORY OF HEMATURIA: Primary | ICD-10-CM

## 2023-05-05 DIAGNOSIS — N52.9 ERECTILE DYSFUNCTION, UNSPECIFIED ERECTILE DYSFUNCTION TYPE: ICD-10-CM

## 2023-05-05 DIAGNOSIS — Z12.5 PROSTATE CANCER SCREENING: ICD-10-CM

## 2023-05-05 PROCEDURE — 3077F SYST BP >= 140 MM HG: CPT | Performed by: UROLOGY

## 2023-05-05 PROCEDURE — 99214 OFFICE O/P EST MOD 30 MIN: CPT | Performed by: UROLOGY

## 2023-05-05 PROCEDURE — 3079F DIAST BP 80-89 MM HG: CPT | Performed by: UROLOGY

## 2023-05-05 RX ORDER — SILDENAFIL 100 MG/1
100 TABLET, FILM COATED ORAL DAILY PRN
Qty: 20 TABLET | Refills: 5 | Status: SHIPPED | OUTPATIENT
Start: 2023-05-05

## 2023-05-05 ASSESSMENT — ENCOUNTER SYMPTOMS
ABDOMINAL PAIN: 0
COUGH: 0
BACK PAIN: 0
CONSTIPATION: 0
WHEEZING: 0
EYE REDNESS: 0
DIARRHEA: 0
NAUSEA: 0
SHORTNESS OF BREATH: 0
EYE PAIN: 0
VOMITING: 0

## 2023-05-05 NOTE — PROGRESS NOTES
[address] [address]  Dept: 92 [address] Pedersen [facility] Urology Office Note - Established    Patient:  iWliam Castellanos  YOB: 1965  Date: 5/5/2023    The patient is a 62 y.o. male who presents todayfor evaluation of the following problems:   Chief Complaint   Patient presents with    Follow-up     6 month with KUB        HPI  Here in follow up for hematuria. He has a h/o stones. He passed a 9mm stone previously. He takes Sildenafil. It works well and he tolerates it. Summary of old records: N/A    Additional History: N/A    Procedures Today: N/A    Urinalysis today:  No results found for this visit on 05/05/23. Last several PSA's:  Lab Results   Component Value Date    PSA 1.43 07/10/2020    PSA 1.81 06/25/2019    PSA 1.06 02/03/2018     Last total testosterone:  No results found for: TESTOSTERONE    AUA Symptom Score (5/5/2023): Last BUN and creatinine:  Lab Results   Component Value Date    BUN 15 10/12/2022     Lab Results   Component Value Date    CREATININE 1.10 10/12/2022       Additional Lab/Culture results: none    Imaging Reviewed during this Office Visit: KUB reviewed. Possible stone noted. (results were independently reviewed by physician and radiology report verified)    PAST MEDICAL, FAMILY AND SOCIAL HISTORY UPDATE:  Past Medical History:   Diagnosis Date    Atrial fibrillation ([address]     [address]    Cancer [facility])     right arm, at site of right arm surgical scar    H/O upper respiratory infection     Hearing loss     Hyperlipidemia     Hypertension     Kidney stones     Neuropathy 2018    ALLEY on CPAP     Umbilical hernia     Varicose vein of leg     Wellness examination     Dr. Isidro Rogel.  [id number]     Past Surgical History:   Procedure Laterality Date    ARM SURGERY Right     x5, tendon injury    ARM SURGERY Left

## 2023-05-15 ENCOUNTER — OFFICE VISIT (OUTPATIENT)
Dept: PODIATRY | Age: 58
End: 2023-05-15
Payer: COMMERCIAL

## 2023-05-15 VITALS — WEIGHT: 315 LBS | HEIGHT: 78 IN | BODY MASS INDEX: 36.45 KG/M2

## 2023-05-15 DIAGNOSIS — G60.9 IDIOPATHIC NEUROPATHY: ICD-10-CM

## 2023-05-15 DIAGNOSIS — M79.672 PAIN IN LEFT FOOT: ICD-10-CM

## 2023-05-15 DIAGNOSIS — M79.674 PAIN OF TOES OF BOTH FEET: ICD-10-CM

## 2023-05-15 DIAGNOSIS — B35.1 ONYCHOMYCOSIS OF TOENAIL: Primary | ICD-10-CM

## 2023-05-15 DIAGNOSIS — M79.671 PAIN IN RIGHT FOOT: ICD-10-CM

## 2023-05-15 DIAGNOSIS — M79.675 PAIN OF TOES OF BOTH FEET: ICD-10-CM

## 2023-05-15 DIAGNOSIS — M72.2 PLANTAR FASCIITIS OF RIGHT FOOT: ICD-10-CM

## 2023-05-15 DIAGNOSIS — M72.2 PLANTAR FASCIITIS OF LEFT FOOT: ICD-10-CM

## 2023-05-15 PROCEDURE — 11721 DEBRIDE NAIL 6 OR MORE: CPT | Performed by: PODIATRIST

## 2023-05-15 PROCEDURE — 99213 OFFICE O/P EST LOW 20 MIN: CPT | Performed by: PODIATRIST

## 2023-05-15 ASSESSMENT — ENCOUNTER SYMPTOMS
COLOR CHANGE: 0
NAUSEA: 0
DIARRHEA: 0
SHORTNESS OF BREATH: 0
BACK PAIN: 0

## 2023-05-15 NOTE — PROGRESS NOTES
SUBJECTIVE: Andi Figueroa is a 62 y.o. male who returns to the office with chief complaint of painful fungal toenails. Patient relates toe nails are thickened/difficult to trim as well as painful with ambulation and with shoe gear. Patient also complains today of pain to the arch of both feet. Chief Complaint   Patient presents with    Nail Problem     Nail trim/last saw Amari Yu 2/23/22     Review of Systems   Constitutional:  Negative for activity change, appetite change, chills, diaphoresis, fatigue and fever. Respiratory:  Negative for shortness of breath. Cardiovascular:  Negative for leg swelling. Gastrointestinal:  Negative for diarrhea and nausea. Endocrine: Negative for cold intolerance, heat intolerance and polyuria. Musculoskeletal:  Positive for arthralgias. Negative for back pain, gait problem, joint swelling and myalgias. Skin:  Negative for color change, pallor, rash and wound. Allergic/Immunologic: Negative for environmental allergies and food allergies. Neurological:  Negative for dizziness, weakness, light-headedness and numbness. Hematological:  Does not bruise/bleed easily. Psychiatric/Behavioral:  Negative for behavioral problems, confusion and self-injury. The patient is not nervous/anxious. OBJECTIVE: Clinical evaluation of patient reveals nails 1,2,3,4,5 of the right foot and nails 1,2,3,4,5 of the left foot to present with thickness, elongation, discoloration, brittleness, and subungual debris. There was pain with palpation and debridement of the toenails of the bilateral feet. No open lesions noted to either foot today. There is pain with palpation to the central band of the plantar fascia within the arch of the bilateral feet. There is no pain at the insertion of the plantar fascia on the heel of either foot. There is no erythema, calor, or open wound noted to either foot.   The patient displays a higher medial longitudinal arch with a normal to the

## 2023-07-24 ENCOUNTER — HOSPITAL ENCOUNTER (OUTPATIENT)
Age: 58
Discharge: HOME | End: 2023-07-24
Payer: COMMERCIAL

## 2023-07-24 DIAGNOSIS — Z12.5 PROSTATE CANCER SCREENING: ICD-10-CM

## 2023-07-24 LAB
ANION GAP SERPL CALCULATED.3IONS-SCNC: 11 MMOL/L (ref 9–17)
BASOPHILS # BLD: 0.1 K/UL (ref 0–0.2)
BASOPHILS NFR BLD: 1 % (ref 0–2)
BUN SERPL-MCNC: 16 MG/DL (ref 6–20)
CALCIUM SERPL-MCNC: 9.9 MG/DL (ref 8.6–10.4)
CHLORIDE SERPL-SCNC: 102 MMOL/L (ref 98–107)
CO2 SERPL-SCNC: 28 MMOL/L (ref 20–31)
CREAT SERPL-MCNC: 1.2 MG/DL (ref 0.7–1.2)
EOSINOPHIL # BLD: 0.2 K/UL (ref 0–0.4)
EOSINOPHILS RELATIVE PERCENT: 2 % (ref 0–4)
ERYTHROCYTE [DISTWIDTH] IN BLOOD BY AUTOMATED COUNT: 15.5 % (ref 11.5–14.9)
GFR SERPL CREATININE-BSD FRML MDRD: >60 ML/MIN/1.73M2
GLUCOSE SERPL-MCNC: 148 MG/DL (ref 70–99)
HCT VFR BLD AUTO: 45.9 % (ref 41–53)
HGB BLD-MCNC: 15.3 G/DL (ref 13.5–17.5)
LYMPHOCYTES NFR BLD: 2.3 K/UL (ref 1–4.8)
LYMPHOCYTES RELATIVE PERCENT: 25 % (ref 24–44)
MAGNESIUM SERPL-MCNC: 2 MG/DL (ref 1.6–2.6)
MCH RBC QN AUTO: 29.5 PG (ref 26–34)
MCHC RBC AUTO-ENTMCNC: 33.2 G/DL (ref 31–37)
MCV RBC AUTO: 88.7 FL (ref 80–100)
MONOCYTES NFR BLD: 0.6 K/UL (ref 0.1–1.3)
MONOCYTES NFR BLD: 6 % (ref 1–7)
NEUTROPHILS NFR BLD: 66 % (ref 36–66)
NEUTS SEG NFR BLD: 6.1 K/UL (ref 1.3–9.1)
PLATELET # BLD AUTO: 264 K/UL (ref 150–450)
PMV BLD AUTO: 7.5 FL (ref 6–12)
POTASSIUM SERPL-SCNC: 3.8 MMOL/L (ref 3.7–5.3)
PSA SERPL-MCNC: 1.45 NG/ML
RBC # BLD AUTO: 5.17 M/UL (ref 4.5–5.9)
SODIUM SERPL-SCNC: 141 MMOL/L (ref 135–144)
WBC OTHER # BLD: 9.2 K/UL (ref 3.5–11)

## 2023-07-24 PROCEDURE — 80048 BASIC METABOLIC PNL TOTAL CA: CPT

## 2023-07-24 PROCEDURE — G0103 PSA SCREENING: HCPCS

## 2023-07-24 PROCEDURE — 36415 COLL VENOUS BLD VENIPUNCTURE: CPT

## 2023-07-24 PROCEDURE — 83735 ASSAY OF MAGNESIUM: CPT

## 2023-07-24 PROCEDURE — 85027 COMPLETE CBC AUTOMATED: CPT

## 2023-08-18 ENCOUNTER — OFFICE VISIT (OUTPATIENT)
Dept: PODIATRY | Age: 58
End: 2023-08-18

## 2023-08-18 VITALS — WEIGHT: 315 LBS | HEIGHT: 78 IN | BODY MASS INDEX: 36.45 KG/M2

## 2023-08-18 DIAGNOSIS — M79.674 PAIN OF TOES OF BOTH FEET: ICD-10-CM

## 2023-08-18 DIAGNOSIS — M79.675 PAIN OF TOES OF BOTH FEET: ICD-10-CM

## 2023-08-18 DIAGNOSIS — B35.1 ONYCHOMYCOSIS OF TOENAIL: Primary | ICD-10-CM

## 2023-08-18 DIAGNOSIS — G60.9 IDIOPATHIC NEUROPATHY: ICD-10-CM

## 2023-08-18 ASSESSMENT — ENCOUNTER SYMPTOMS
NAUSEA: 0
SHORTNESS OF BREATH: 0
COLOR CHANGE: 0
DIARRHEA: 0
BACK PAIN: 0

## 2023-09-28 ENCOUNTER — HOSPITAL ENCOUNTER (OUTPATIENT)
Dept: VASCULAR LAB | Age: 58
Discharge: HOME | End: 2023-09-30
Attending: UROLOGY
Payer: COMMERCIAL

## 2023-09-28 ENCOUNTER — HOSPITAL ENCOUNTER (OUTPATIENT)
Dept: ULTRASOUND IMAGING | Age: 58
Discharge: HOME | End: 2023-09-30
Attending: UROLOGY
Payer: COMMERCIAL

## 2023-09-28 DIAGNOSIS — N20.0 KIDNEY STONE: ICD-10-CM

## 2023-09-28 PROCEDURE — 76775 US EXAM ABDO BACK WALL LIM: CPT

## 2023-10-31 ASSESSMENT — PATIENT HEALTH QUESTIONNAIRE - PHQ9
SUM OF ALL RESPONSES TO PHQ QUESTIONS 1-9: 0
SUM OF ALL RESPONSES TO PHQ9 QUESTIONS 1 & 2: 0
2. FEELING DOWN, DEPRESSED OR HOPELESS: NOT AT ALL
1. LITTLE INTEREST OR PLEASURE IN DOING THINGS: 0
SUM OF ALL RESPONSES TO PHQ9 QUESTIONS 1 & 2: 0
2. FEELING DOWN, DEPRESSED OR HOPELESS: 0
SUM OF ALL RESPONSES TO PHQ QUESTIONS 1-9: 0
1. LITTLE INTEREST OR PLEASURE IN DOING THINGS: NOT AT ALL
SUM OF ALL RESPONSES TO PHQ QUESTIONS 1-9: 0
SUM OF ALL RESPONSES TO PHQ QUESTIONS 1-9: 0

## 2023-11-01 ENCOUNTER — OFFICE VISIT (OUTPATIENT)
Dept: PRIMARY CARE CLINIC | Age: 58
End: 2023-11-01
Payer: COMMERCIAL

## 2023-11-01 VITALS
DIASTOLIC BLOOD PRESSURE: 86 MMHG | WEIGHT: 315 LBS | SYSTOLIC BLOOD PRESSURE: 144 MMHG | HEIGHT: 78 IN | HEART RATE: 85 BPM | OXYGEN SATURATION: 96 % | BODY MASS INDEX: 36.45 KG/M2

## 2023-11-01 DIAGNOSIS — I10 ESSENTIAL HYPERTENSION: ICD-10-CM

## 2023-11-01 DIAGNOSIS — I48.0 PAROXYSMAL ATRIAL FIBRILLATION (HCC): ICD-10-CM

## 2023-11-01 DIAGNOSIS — M66.821: Primary | ICD-10-CM

## 2023-11-01 DIAGNOSIS — G60.9 NEUROPATHY, IDIOPATHIC: ICD-10-CM

## 2023-11-01 PROCEDURE — 99213 OFFICE O/P EST LOW 20 MIN: CPT | Performed by: FAMILY MEDICINE

## 2023-11-01 PROCEDURE — 3079F DIAST BP 80-89 MM HG: CPT | Performed by: FAMILY MEDICINE

## 2023-11-01 PROCEDURE — 3077F SYST BP >= 140 MM HG: CPT | Performed by: FAMILY MEDICINE

## 2023-11-01 RX ORDER — TRIAMTERENE AND HYDROCHLOROTHIAZIDE 75; 50 MG/1; MG/1
1 TABLET ORAL DAILY
Qty: 90 TABLET | Refills: 3
Start: 2023-11-01

## 2023-11-01 ASSESSMENT — ENCOUNTER SYMPTOMS
NAUSEA: 0
SORE THROAT: 0
EYE DISCHARGE: 0
ABDOMINAL PAIN: 0
SHORTNESS OF BREATH: 0
COUGH: 0
DIARRHEA: 0
RHINORRHEA: 0
EYE REDNESS: 0
WHEEZING: 0
VOMITING: 0

## 2023-11-01 NOTE — PROGRESS NOTES
[address] PRIMARY CARE  [address] [facility] [address]  Dept: Sandra is a 62 y.o. male Established patient, who presents today for his medical conditions/complaints as noted below. Chief Complaint   Patient presents with    Hypertension    Forms     Patient said he has forms that need filled out        HPI:     HPI  Pt here with hypertension. Later developed atrial fib. Pt had ablation done 7/30 for atrial fib. Patient states 1 week later had gone back in atrial for but then converted. Patient sees cardiology next week. Cardiology states they will be adjusting his medications including blood pressure medications based on the results. Patient with history spontaneous rupture of tendon in the arm. Has had residual hand weakness ever since. Patient continues to have decrease in  strength. Continues to have a nonspecific numbness tingling of his hands and feet. 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 Was tried on multiple medications but the only other finding. Currently not taking anything for this.       Reviewed prior notes None  Reviewed previous Labs    LDL Cholesterol (mg/dL)   Date Value   04/04/2023 151 (H)   09/22/2021 150 (H)   07/10/2020 88       (goal LDL is <100)   AST (U/L)   Date Value   10/12/2022 24     ALT (U/L)   Date Value   10/12/2022 38     BUN (mg/dL)   Date Value   07/24/2023 16     Hemoglobin A1C (%)   Date Value   02/08/2021 5.2     TSH (mIU/L)   Date Value   02/03/2018 1.98     BP Readings from Last 3 Encounters:   11/01/23 (!) 144/86   05/05/23 (!) 142/80   03/15/23 (!) 147/87          (goal 120/80)    Past Medical History:   Diagnosis Date    Atrial fibrillation ([address])     [facility]    Cancer [facility])     right arm, at site of right arm surgical scar    Chronic kidney disease 1975    H/O upper respiratory infection

## 2023-11-06 ENCOUNTER — OFFICE VISIT (OUTPATIENT)
Dept: PODIATRY | Age: 58
End: 2023-11-06
Payer: COMMERCIAL

## 2023-11-06 VITALS — BODY MASS INDEX: 36.45 KG/M2 | WEIGHT: 315 LBS | HEIGHT: 78 IN

## 2023-11-06 DIAGNOSIS — M79.675 PAIN OF TOES OF BOTH FEET: ICD-10-CM

## 2023-11-06 DIAGNOSIS — B35.1 ONYCHOMYCOSIS OF TOENAIL: Primary | ICD-10-CM

## 2023-11-06 DIAGNOSIS — M79.674 PAIN OF TOES OF BOTH FEET: ICD-10-CM

## 2023-11-06 DIAGNOSIS — G60.9 IDIOPATHIC NEUROPATHY: ICD-10-CM

## 2023-11-06 PROCEDURE — 99999 PR OFFICE/OUTPT VISIT,PROCEDURE ONLY: CPT | Performed by: PODIATRIST

## 2023-11-06 PROCEDURE — 11721 DEBRIDE NAIL 6 OR MORE: CPT | Performed by: PODIATRIST

## 2023-11-07 ENCOUNTER — OFFICE VISIT (OUTPATIENT)
Dept: UROLOGY | Age: 58
End: 2023-11-07
Payer: COMMERCIAL

## 2023-11-07 VITALS
TEMPERATURE: 97.6 F | OXYGEN SATURATION: 96 % | SYSTOLIC BLOOD PRESSURE: 143 MMHG | BODY MASS INDEX: 36.45 KG/M2 | WEIGHT: 315 LBS | HEIGHT: 78 IN | DIASTOLIC BLOOD PRESSURE: 81 MMHG | HEART RATE: 87 BPM

## 2023-11-07 DIAGNOSIS — Z87.442 HISTORY OF KIDNEY STONES: Primary | ICD-10-CM

## 2023-11-07 DIAGNOSIS — Z12.5 PROSTATE CANCER SCREENING: ICD-10-CM

## 2023-11-07 PROCEDURE — 99214 OFFICE O/P EST MOD 30 MIN: CPT | Performed by: UROLOGY

## 2023-11-07 PROCEDURE — 3079F DIAST BP 80-89 MM HG: CPT | Performed by: UROLOGY

## 2023-11-07 PROCEDURE — 3077F SYST BP >= 140 MM HG: CPT | Performed by: UROLOGY

## 2023-11-07 ASSESSMENT — ENCOUNTER SYMPTOMS
SHORTNESS OF BREATH: 0
COUGH: 0
WHEEZING: 0
ABDOMINAL PAIN: 0
BACK PAIN: 0
EYE REDNESS: 0
CONSTIPATION: 0
DIARRHEA: 0
NAUSEA: 0
EYE PAIN: 0
VOMITING: 0

## 2023-11-13 ASSESSMENT — ENCOUNTER SYMPTOMS
BACK PAIN: 0
NAUSEA: 0
COLOR CHANGE: 0
DIARRHEA: 0
SHORTNESS OF BREATH: 0

## 2024-01-22 ENCOUNTER — OFFICE VISIT (OUTPATIENT)
Dept: PODIATRY | Age: 59
End: 2024-01-22
Payer: COMMERCIAL

## 2024-01-22 VITALS — BODY MASS INDEX: 36.45 KG/M2 | HEIGHT: 78 IN | WEIGHT: 315 LBS

## 2024-01-22 DIAGNOSIS — M79.674 PAIN OF TOES OF BOTH FEET: ICD-10-CM

## 2024-01-22 DIAGNOSIS — M79.675 PAIN OF TOES OF BOTH FEET: ICD-10-CM

## 2024-01-22 DIAGNOSIS — B35.1 ONYCHOMYCOSIS OF TOENAIL: Primary | ICD-10-CM

## 2024-01-22 DIAGNOSIS — G60.9 IDIOPATHIC NEUROPATHY: ICD-10-CM

## 2024-01-22 PROCEDURE — 99999 PR OFFICE/OUTPT VISIT,PROCEDURE ONLY: CPT | Performed by: PODIATRIST

## 2024-01-22 PROCEDURE — 11721 DEBRIDE NAIL 6 OR MORE: CPT | Performed by: PODIATRIST

## 2024-01-22 RX ORDER — AMLODIPINE BESYLATE 5 MG/1
5 TABLET ORAL DAILY
COMMUNITY
Start: 2023-12-01

## 2024-01-22 RX ORDER — ASPIRIN 81 MG/1
81 TABLET ORAL DAILY
COMMUNITY
Start: 2023-11-06

## 2024-01-23 ASSESSMENT — ENCOUNTER SYMPTOMS
SHORTNESS OF BREATH: 0
DIARRHEA: 0
COLOR CHANGE: 0
NAUSEA: 0
BACK PAIN: 0

## 2024-01-23 NOTE — PROGRESS NOTES
SUBJECTIVE: Kain Sanches is a 58 y.o. male who returns to the office with chief complaint of painful fungal toenails. Patient relates toe nails are thickened/difficult to trim as well as painful with ambulation and with shoe gear.   Chief Complaint   Patient presents with    Nail Problem     Nail trim/last saw Dr.John Roach 11/1/2023     Review of Systems   Constitutional:  Negative for activity change, appetite change, chills, diaphoresis, fatigue and fever.   Respiratory:  Negative for shortness of breath.    Cardiovascular:  Negative for leg swelling.   Gastrointestinal:  Negative for diarrhea and nausea.   Endocrine: Negative for cold intolerance, heat intolerance and polyuria.   Musculoskeletal:  Positive for arthralgias. Negative for back pain, gait problem, joint swelling and myalgias.   Skin:  Negative for color change, pallor, rash and wound.   Allergic/Immunologic: Negative for environmental allergies and food allergies.   Neurological:  Negative for dizziness, weakness, light-headedness and numbness.   Hematological:  Does not bruise/bleed easily.   Psychiatric/Behavioral:  Negative for behavioral problems, confusion and self-injury. The patient is not nervous/anxious.      OBJECTIVE: Clinical evaluation of patient reveals nails 1,2,3,4,5 of the right foot and nails 1,2,3,4,5 of the left foot to present with thickness, elongation, discoloration, brittleness, and subungual debris. There was pain with palpation and debridement of the toenails of the bilateral feet. No open lesions noted to either foot today.     Class A Findings (1 needed)   [] Non-traumatic amputation of foot or integral skeleton portion thereof.   [] Q7.      Class B Findings (2 needed)   1. [] Absent posterior tibial pulse   2. [] Absent dorsalis pedis pulse   3. [] Advanced trophic changes; three of the following are required:   ·         [] hair growth (decrease or absence)   ·         [] nail changes (thickening)   ·         []

## 2024-02-01 RX ORDER — ALLOPURINOL 100 MG/1
TABLET ORAL
Qty: 180 TABLET | Refills: 3 | Status: SHIPPED | OUTPATIENT
Start: 2024-02-01

## 2024-03-12 RX ORDER — TRIAMTERENE AND HYDROCHLOROTHIAZIDE 75; 50 MG/1; MG/1
1 TABLET ORAL DAILY
Qty: 90 TABLET | Refills: 3 | Status: SHIPPED | OUTPATIENT
Start: 2024-03-12

## 2024-04-08 ENCOUNTER — OFFICE VISIT (OUTPATIENT)
Dept: PODIATRY | Age: 59
End: 2024-04-08
Payer: COMMERCIAL

## 2024-04-08 VITALS — BODY MASS INDEX: 36.45 KG/M2 | HEIGHT: 78 IN | WEIGHT: 315 LBS

## 2024-04-08 DIAGNOSIS — M79.674 PAIN OF TOES OF BOTH FEET: ICD-10-CM

## 2024-04-08 DIAGNOSIS — G60.9 IDIOPATHIC NEUROPATHY: ICD-10-CM

## 2024-04-08 DIAGNOSIS — B35.1 ONYCHOMYCOSIS OF TOENAIL: Primary | ICD-10-CM

## 2024-04-08 DIAGNOSIS — M79.675 PAIN OF TOES OF BOTH FEET: ICD-10-CM

## 2024-04-08 PROCEDURE — 99999 PR OFFICE/OUTPT VISIT,PROCEDURE ONLY: CPT | Performed by: PODIATRIST

## 2024-04-08 PROCEDURE — 11721 DEBRIDE NAIL 6 OR MORE: CPT | Performed by: PODIATRIST

## 2024-04-10 ASSESSMENT — ENCOUNTER SYMPTOMS
DIARRHEA: 0
BACK PAIN: 0
NAUSEA: 0
COLOR CHANGE: 0
SHORTNESS OF BREATH: 0

## 2024-04-10 NOTE — PROGRESS NOTES
SUBJECTIVE: Kain Sanches is a 58 y.o. male who returns to the office with chief complaint of painful fungal toenails. Patient relates toe nails are thickened/difficult to trim as well as painful with ambulation and with shoe gear.   Chief Complaint   Patient presents with    Nail Problem     B/l nail trim, last seen Abhi Roach MD 11/1/23     Review of Systems   Constitutional:  Negative for activity change, appetite change, chills, diaphoresis, fatigue and fever.   Respiratory:  Negative for shortness of breath.    Cardiovascular:  Negative for leg swelling.   Gastrointestinal:  Negative for diarrhea and nausea.   Endocrine: Negative for cold intolerance, heat intolerance and polyuria.   Musculoskeletal:  Positive for arthralgias. Negative for back pain, gait problem, joint swelling and myalgias.   Skin:  Negative for color change, pallor, rash and wound.   Allergic/Immunologic: Negative for environmental allergies and food allergies.   Neurological:  Negative for dizziness, weakness, light-headedness and numbness.   Hematological:  Does not bruise/bleed easily.   Psychiatric/Behavioral:  Negative for behavioral problems, confusion and self-injury. The patient is not nervous/anxious.      OBJECTIVE: Clinical evaluation of patient reveals nails 1,2,3,4,5 of the right foot and nails 1,2,3,4,5 of the left foot to present with thickness, elongation, discoloration, brittleness, and subungual debris. There was pain with palpation and debridement of the toenails of the bilateral feet. No open lesions noted to either foot today.     Class A Findings (1 needed)   [] Non-traumatic amputation of foot or integral skeleton portion thereof.   [] Q7.      Class B Findings (2 needed)   1. [] Absent posterior tibial pulse   2. [] Absent dorsalis pedis pulse   3. [] Advanced trophic changes; three of the following are required:   ·         [] hair growth (decrease or absence)   ·         [] nail changes (thickening)   ·

## 2024-06-24 ENCOUNTER — OFFICE VISIT (OUTPATIENT)
Dept: PODIATRY | Age: 59
End: 2024-06-24
Payer: COMMERCIAL

## 2024-06-24 VITALS — WEIGHT: 315 LBS | HEIGHT: 78 IN | BODY MASS INDEX: 36.45 KG/M2

## 2024-06-24 DIAGNOSIS — B35.1 ONYCHOMYCOSIS OF TOENAIL: Primary | ICD-10-CM

## 2024-06-24 DIAGNOSIS — M79.675 PAIN OF TOES OF BOTH FEET: ICD-10-CM

## 2024-06-24 DIAGNOSIS — G60.9 IDIOPATHIC NEUROPATHY: ICD-10-CM

## 2024-06-24 DIAGNOSIS — M79.674 PAIN OF TOES OF BOTH FEET: ICD-10-CM

## 2024-06-24 PROCEDURE — 99999 PR OFFICE/OUTPT VISIT,PROCEDURE ONLY: CPT | Performed by: PODIATRIST

## 2024-06-24 PROCEDURE — 11721 DEBRIDE NAIL 6 OR MORE: CPT | Performed by: PODIATRIST

## 2024-06-24 ASSESSMENT — ENCOUNTER SYMPTOMS
NAUSEA: 0
COLOR CHANGE: 0
SHORTNESS OF BREATH: 0
DIARRHEA: 0
BACK PAIN: 0

## 2024-07-30 DIAGNOSIS — N52.9 ERECTILE DYSFUNCTION, UNSPECIFIED ERECTILE DYSFUNCTION TYPE: ICD-10-CM

## 2024-08-01 NOTE — TELEPHONE ENCOUNTER
Last seen 11/7/23  Plan:   He is doing well.   PSA is normal.   Ultrasound is negative.   Continue Sildenafil.   100mg works better than 50mg.      Return in about 1 year (around 11/7/2024) for Labs.    Med last sent 5/5/23, 20 pills, 5 refills

## 2024-08-12 RX ORDER — SILDENAFIL 100 MG/1
100 TABLET, FILM COATED ORAL DAILY PRN
Qty: 20 TABLET | Refills: 5 | Status: SHIPPED | OUTPATIENT
Start: 2024-08-12

## 2024-08-30 ENCOUNTER — HOSPITAL ENCOUNTER (OUTPATIENT)
Age: 59
Discharge: HOME | End: 2024-08-30
Payer: COMMERCIAL

## 2024-08-30 LAB
ANION GAP SERPL CALCULATED.3IONS-SCNC: 13 MMOL/L (ref 9–17)
BUN SERPL-MCNC: 14 MG/DL (ref 6–20)
CALCIUM SERPL-MCNC: 9.3 MG/DL (ref 8.6–10.4)
CHLORIDE SERPL-SCNC: 101 MMOL/L (ref 98–107)
CHOLEST SERPL-MCNC: 223 MG/DL
CHOLESTEROL/HDL RATIO: 4.2
CO2 SERPL-SCNC: 26 MMOL/L (ref 20–31)
CREAT SERPL-MCNC: 1.1 MG/DL (ref 0.7–1.2)
GFR, ESTIMATED: 78 ML/MIN/1.73M2
GLUCOSE SERPL-MCNC: 103 MG/DL (ref 70–99)
HDLC SERPL-MCNC: 53 MG/DL
LDLC SERPL CALC-MCNC: 158 MG/DL (ref 0–130)
MAGNESIUM SERPL-MCNC: 2.3 MG/DL (ref 1.6–2.6)
POTASSIUM SERPL-SCNC: 3.3 MMOL/L (ref 3.7–5.3)
PSA SERPL-MCNC: 1.5 NG/ML (ref 0–4)
SODIUM SERPL-SCNC: 140 MMOL/L (ref 135–144)
TRIGL SERPL-MCNC: 59 MG/DL

## 2024-08-30 PROCEDURE — 80061 LIPID PANEL: CPT

## 2024-08-30 PROCEDURE — 36415 COLL VENOUS BLD VENIPUNCTURE: CPT

## 2024-08-30 PROCEDURE — G0103 PSA SCREENING: HCPCS

## 2024-08-30 PROCEDURE — 80048 BASIC METABOLIC PNL TOTAL CA: CPT

## 2024-08-30 PROCEDURE — 83735 ASSAY OF MAGNESIUM: CPT

## 2024-09-09 ENCOUNTER — OFFICE VISIT (OUTPATIENT)
Dept: PODIATRY | Age: 59
End: 2024-09-09

## 2024-09-09 VITALS — BODY MASS INDEX: 36.45 KG/M2 | WEIGHT: 315 LBS | HEIGHT: 78 IN

## 2024-09-09 DIAGNOSIS — M79.674 PAIN OF TOES OF BOTH FEET: ICD-10-CM

## 2024-09-09 DIAGNOSIS — B35.1 ONYCHOMYCOSIS OF TOENAIL: Primary | ICD-10-CM

## 2024-09-09 DIAGNOSIS — M79.675 PAIN OF TOES OF BOTH FEET: ICD-10-CM

## 2024-09-09 DIAGNOSIS — G60.9 IDIOPATHIC NEUROPATHY: ICD-10-CM

## 2024-09-19 ENCOUNTER — HOSPITAL ENCOUNTER (OUTPATIENT)
Age: 59
Discharge: HOME | End: 2024-09-19
Payer: COMMERCIAL

## 2024-09-19 LAB
ANION GAP SERPL CALCULATED.3IONS-SCNC: 11 MMOL/L (ref 9–17)
BUN SERPL-MCNC: 18 MG/DL (ref 6–20)
CALCIUM SERPL-MCNC: 9.5 MG/DL (ref 8.6–10.4)
CHLORIDE SERPL-SCNC: 101 MMOL/L (ref 98–107)
CO2 SERPL-SCNC: 27 MMOL/L (ref 20–31)
CREAT SERPL-MCNC: 1.3 MG/DL (ref 0.7–1.2)
GFR, ESTIMATED: 64 ML/MIN/1.73M2
GLUCOSE SERPL-MCNC: 113 MG/DL (ref 70–99)
POTASSIUM SERPL-SCNC: 3.7 MMOL/L (ref 3.7–5.3)
SODIUM SERPL-SCNC: 139 MMOL/L (ref 135–144)

## 2024-09-19 PROCEDURE — 80048 BASIC METABOLIC PNL TOTAL CA: CPT

## 2024-09-19 PROCEDURE — 36415 COLL VENOUS BLD VENIPUNCTURE: CPT

## 2024-09-28 SDOH — HEALTH STABILITY: PHYSICAL HEALTH: ON AVERAGE, HOW MANY DAYS PER WEEK DO YOU ENGAGE IN MODERATE TO STRENUOUS EXERCISE (LIKE A BRISK WALK)?: 0 DAYS

## 2024-09-30 ENCOUNTER — OFFICE VISIT (OUTPATIENT)
Dept: ORTHOPEDIC SURGERY | Age: 59
End: 2024-09-30
Payer: COMMERCIAL

## 2024-09-30 DIAGNOSIS — M25.561 PAIN IN BOTH KNEES, UNSPECIFIED CHRONICITY: Primary | ICD-10-CM

## 2024-09-30 DIAGNOSIS — M25.562 PAIN IN BOTH KNEES, UNSPECIFIED CHRONICITY: Primary | ICD-10-CM

## 2024-09-30 PROCEDURE — 99203 OFFICE O/P NEW LOW 30 MIN: CPT | Performed by: ORTHOPAEDIC SURGERY

## 2024-09-30 RX ORDER — MELOXICAM 15 MG/1
15 TABLET ORAL DAILY
Qty: 30 TABLET | Refills: 3 | Status: SHIPPED | OUTPATIENT
Start: 2024-09-30

## 2024-09-30 NOTE — PROGRESS NOTES
[facility]                   Kain Martel M.D.            [address], [address]               [place], [place] [address]           Dept Phone: [phone]           Dept Fax:  [phone] [address] [facility]                       [address]           [place], [place] [address]          Dept Phone: [phone]           Dept Fax:  [phone]      Chief Compliant:  No chief complaint on file.       History of Present Illness:  This is a 58 y.o. male who presents to the clinic today for evaluation / follow up of bilateral knee pain.  Patient states that he has had knee pain on and off for many years.  He denies any previous surgeries or major injuries.  He did state he got injections many years ago.  He states that his right knee was bothersome for some time but is in the last week it got to be worse basically asymptomatic.  He says his symptoms, go.  He is presently not on anti-inflammatories.       Review of Systems   Constitutional: Negative for fever, chills, sweats.   Eyes: Negative for changes in vision, or pain.   HENT: Negative for ear ache, epistaxis, or sore throat.  Respiratory/Cardio: Negative for Chest pain, palpitations, SOB, or cough.  Gastrointestinal: Negative for abdominal pain, N/V/D.   Genitourinary: Negative for dysuria, frequency, urgency, or hematuria.   Neurological: Negative for headache, numbness, or weakness.   Integumentary: Negative for rash, itching, laceration, or abrasion.   Musculoskeletal: Positive for No chief complaint on file.       Physical Exam:  Constitutional: Patient is oriented to person, place, and time. Patient appears well-developed and well nourished.   HENT: Negative otherwise noted  Head: Normocephalic and Atraumatic  Nose: Normal  Eyes: Conjunctivae and EOM are normal  Neck: Normal range of motion Neck supple.    Respiratory/Cardio: Effort normal. No respiratory distress.  Musculoskeletal: Physical examination the

## 2024-11-25 ENCOUNTER — OFFICE VISIT (OUTPATIENT)
Dept: PODIATRY | Age: 59
End: 2024-11-25
Payer: COMMERCIAL

## 2024-11-25 VITALS — HEIGHT: 78 IN | WEIGHT: 315 LBS | BODY MASS INDEX: 36.45 KG/M2

## 2024-11-25 DIAGNOSIS — B35.1 ONYCHOMYCOSIS OF TOENAIL: Primary | ICD-10-CM

## 2024-11-25 DIAGNOSIS — G60.9 IDIOPATHIC NEUROPATHY: ICD-10-CM

## 2024-11-25 DIAGNOSIS — M79.675 PAIN OF TOES OF BOTH FEET: ICD-10-CM

## 2024-11-25 DIAGNOSIS — M79.674 PAIN OF TOES OF BOTH FEET: ICD-10-CM

## 2024-11-25 PROCEDURE — 11721 DEBRIDE NAIL 6 OR MORE: CPT | Performed by: PODIATRIST

## 2024-11-25 PROCEDURE — 99999 PR OFFICE/OUTPT VISIT,PROCEDURE ONLY: CPT | Performed by: PODIATRIST

## 2024-11-25 RX ORDER — SPIRONOLACTONE 25 MG/1
25 TABLET ORAL DAILY
COMMUNITY
Start: 2024-09-12

## 2024-11-25 ASSESSMENT — ENCOUNTER SYMPTOMS
BACK PAIN: 0
COLOR CHANGE: 0
SHORTNESS OF BREATH: 0
NAUSEA: 0
DIARRHEA: 0

## 2024-11-25 NOTE — PROGRESS NOTES
SUBJECTIVE: Kain Sanches is a 59 y.o. male who returns to the office with chief complaint of painful fungal toenails. Patient relates toe nails are thickened/difficult to trim as well as painful with ambulation and with shoe gear.   Chief Complaint   Patient presents with    Nail Problem     B/l nail trim     Review of Systems   Constitutional:  Negative for activity change, appetite change, chills, diaphoresis, fatigue and fever.   Respiratory:  Negative for shortness of breath.    Cardiovascular:  Negative for leg swelling.   Gastrointestinal:  Negative for diarrhea and nausea.   Endocrine: Negative for cold intolerance, heat intolerance and polyuria.   Musculoskeletal:  Positive for arthralgias. Negative for back pain, gait problem, joint swelling and myalgias.   Skin:  Negative for color change, pallor, rash and wound.   Allergic/Immunologic: Negative for environmental allergies and food allergies.   Neurological:  Negative for dizziness, weakness, light-headedness and numbness.   Hematological:  Does not bruise/bleed easily.   Psychiatric/Behavioral:  Negative for behavioral problems, confusion and self-injury. The patient is not nervous/anxious.      OBJECTIVE: Clinical evaluation of patient reveals nails 1,2,3,4,5 of the right foot and nails 1,2,3,4,5 of the left foot to present with thickness, elongation, discoloration, brittleness, and subungual debris. There was pain with palpation and debridement of the toenails of the bilateral feet. No open lesions noted to either foot today.    Protective sensation is absent to the right plantar foot as noted with a 5.07 [place] monofilament.   Protective sensation is absent to the left plantar foot as noted with a 5.07 [place] monofilament.     Class A Findings (1 needed)   [] Non-traumatic amputation of foot or integral skeleton portion thereof.   [] Q7.      Class B Findings (2 needed)   1. [] Absent posterior tibial pulse   2. [] Absent

## 2024-12-07 SDOH — ECONOMIC STABILITY: FOOD INSECURITY: WITHIN THE PAST 12 MONTHS, THE FOOD YOU BOUGHT JUST DIDN'T LAST AND YOU DIDN'T HAVE MONEY TO GET MORE.: NEVER TRUE

## 2024-12-07 SDOH — ECONOMIC STABILITY: FOOD INSECURITY: WITHIN THE PAST 12 MONTHS, YOU WORRIED THAT YOUR FOOD WOULD RUN OUT BEFORE YOU GOT MONEY TO BUY MORE.: NEVER TRUE

## 2024-12-07 SDOH — ECONOMIC STABILITY: TRANSPORTATION INSECURITY
IN THE PAST 12 MONTHS, HAS LACK OF TRANSPORTATION KEPT YOU FROM MEETINGS, WORK, OR FROM GETTING THINGS NEEDED FOR DAILY LIVING?: NO

## 2024-12-07 SDOH — ECONOMIC STABILITY: INCOME INSECURITY: HOW HARD IS IT FOR YOU TO PAY FOR THE VERY BASICS LIKE FOOD, HOUSING, MEDICAL CARE, AND HEATING?: NOT HARD AT ALL

## 2024-12-07 ASSESSMENT — PATIENT HEALTH QUESTIONNAIRE - PHQ9
SUM OF ALL RESPONSES TO PHQ QUESTIONS 1-9: 0
SUM OF ALL RESPONSES TO PHQ QUESTIONS 1-9: 0
SUM OF ALL RESPONSES TO PHQ9 QUESTIONS 1 & 2: 0
1. LITTLE INTEREST OR PLEASURE IN DOING THINGS: NOT AT ALL
2. FEELING DOWN, DEPRESSED OR HOPELESS: NOT AT ALL
SUM OF ALL RESPONSES TO PHQ QUESTIONS 1-9: 0
SUM OF ALL RESPONSES TO PHQ QUESTIONS 1-9: 0

## 2024-12-08 ENCOUNTER — APPOINTMENT (OUTPATIENT)
Dept: GENERAL RADIOLOGY | Age: 59
DRG: 392 | End: 2024-12-08
Payer: COMMERCIAL

## 2024-12-08 ENCOUNTER — HOSPITAL ENCOUNTER (INPATIENT)
Age: 59
LOS: 2 days | Discharge: HOME | DRG: 392 | End: 2024-12-10
Attending: EMERGENCY MEDICINE | Admitting: INTERNAL MEDICINE
Payer: COMMERCIAL

## 2024-12-08 DIAGNOSIS — R07.9 CHEST PAIN, UNSPECIFIED TYPE: Primary | ICD-10-CM

## 2024-12-08 DIAGNOSIS — I20.0 UNSTABLE ANGINA PECTORIS (HCC): ICD-10-CM

## 2024-12-08 PROBLEM — I20.89 ANGINA AT REST (HCC): Status: ACTIVE | Noted: 2024-12-08

## 2024-12-08 LAB
ALBUMIN SERPL-MCNC: 4.2 G/DL (ref 3.5–5.2)
ALP SERPL-CCNC: 93 U/L (ref 40–129)
ALT SERPL-CCNC: 28 U/L (ref 10–50)
ANION GAP SERPL CALCULATED.3IONS-SCNC: 15 MMOL/L (ref 9–16)
AST SERPL-CCNC: 24 U/L (ref 10–50)
BASOPHILS # BLD: 0 K/UL (ref 0–0.2)
BASOPHILS NFR BLD: 0 % (ref 0–2)
BILIRUB SERPL-MCNC: 0.8 MG/DL (ref 0–1.2)
BNP SERPL-MCNC: 43 PG/ML (ref 0–300)
BUN SERPL-MCNC: 22 MG/DL (ref 6–20)
CALCIUM SERPL-MCNC: 9.5 MG/DL (ref 8.6–10.4)
CHLORIDE SERPL-SCNC: 101 MMOL/L (ref 98–107)
CO2 SERPL-SCNC: 23 MMOL/L (ref 20–31)
CREAT SERPL-MCNC: 1.2 MG/DL (ref 0.7–1.2)
EOSINOPHIL # BLD: 0 K/UL (ref 0–0.4)
EOSINOPHILS RELATIVE PERCENT: 0 % (ref 0–4)
ERYTHROCYTE [DISTWIDTH] IN BLOOD BY AUTOMATED COUNT: 14.9 % (ref 11.5–14.9)
FLUAV RNA RESP QL NAA+PROBE: NOT DETECTED
FLUBV RNA RESP QL NAA+PROBE: NOT DETECTED
GFR, ESTIMATED: 70 ML/MIN/1.73M2
GLUCOSE SERPL-MCNC: 135 MG/DL (ref 74–99)
HCT VFR BLD AUTO: 49.8 % (ref 41–53)
HGB BLD-MCNC: 16.6 G/DL (ref 13.5–17.5)
LIPASE SERPL-CCNC: 66 U/L (ref 13–60)
LYMPHOCYTES NFR BLD: 0.34 K/UL (ref 1–4.8)
LYMPHOCYTES RELATIVE PERCENT: 2 % (ref 24–44)
MCH RBC QN AUTO: 29.6 PG (ref 26–34)
MCHC RBC AUTO-ENTMCNC: 33.4 G/DL (ref 31–37)
MCV RBC AUTO: 88.6 FL (ref 80–100)
MONOCYTES NFR BLD: 0.51 K/UL (ref 0.1–1.3)
MONOCYTES NFR BLD: 3 % (ref 1–7)
MORPHOLOGY: ABNORMAL
NEUTROPHILS NFR BLD: 95 % (ref 36–66)
NEUTS SEG NFR BLD: 16.05 K/UL (ref 1.3–9.1)
PLATELET # BLD AUTO: 270 K/UL (ref 150–450)
PMV BLD AUTO: 7.6 FL (ref 6–12)
POTASSIUM SERPL-SCNC: 4 MMOL/L (ref 3.7–5.3)
PROT SERPL-MCNC: 7.6 G/DL (ref 6.6–8.7)
RBC # BLD AUTO: 5.62 M/UL (ref 4.5–5.9)
SARS-COV-2 RNA RESP QL NAA+PROBE: NOT DETECTED
SODIUM SERPL-SCNC: 139 MMOL/L (ref 136–145)
SOURCE: NORMAL
SPECIMEN DESCRIPTION: NORMAL
TROPONIN I SERPL HS-MCNC: 8 NG/L (ref 0–22)
TROPONIN I SERPL HS-MCNC: 8 NG/L (ref 0–22)
WBC OTHER # BLD: 16.9 K/UL (ref 3.5–11)

## 2024-12-08 PROCEDURE — 2060000000 HC ICU INTERMEDIATE R&B

## 2024-12-08 PROCEDURE — 96374 THER/PROPH/DIAG INJ IV PUSH: CPT

## 2024-12-08 PROCEDURE — 36415 COLL VENOUS BLD VENIPUNCTURE: CPT

## 2024-12-08 PROCEDURE — 84484 ASSAY OF TROPONIN QUANT: CPT

## 2024-12-08 PROCEDURE — 6360000002 HC RX W HCPCS: Performed by: EMERGENCY MEDICINE

## 2024-12-08 PROCEDURE — 71045 X-RAY EXAM CHEST 1 VIEW: CPT

## 2024-12-08 PROCEDURE — 99223 1ST HOSP IP/OBS HIGH 75: CPT

## 2024-12-08 PROCEDURE — 80053 COMPREHEN METABOLIC PANEL: CPT

## 2024-12-08 PROCEDURE — 87636 SARSCOV2 & INF A&B AMP PRB: CPT

## 2024-12-08 PROCEDURE — 93005 ELECTROCARDIOGRAM TRACING: CPT | Performed by: EMERGENCY MEDICINE

## 2024-12-08 PROCEDURE — 99285 EMERGENCY DEPT VISIT HI MDM: CPT

## 2024-12-08 PROCEDURE — 85025 COMPLETE CBC W/AUTO DIFF WBC: CPT

## 2024-12-08 PROCEDURE — 6360000002 HC RX W HCPCS

## 2024-12-08 PROCEDURE — 83690 ASSAY OF LIPASE: CPT

## 2024-12-08 PROCEDURE — 83880 ASSAY OF NATRIURETIC PEPTIDE: CPT

## 2024-12-08 PROCEDURE — 2580000003 HC RX 258

## 2024-12-08 RX ORDER — SODIUM CHLORIDE 9 MG/ML
INJECTION, SOLUTION INTRAVENOUS CONTINUOUS
Status: DISCONTINUED | OUTPATIENT
Start: 2024-12-08 | End: 2024-12-10 | Stop reason: HOSPADM

## 2024-12-08 RX ORDER — ACETAMINOPHEN 325 MG/1
650 TABLET ORAL EVERY 6 HOURS PRN
Status: DISCONTINUED | OUTPATIENT
Start: 2024-12-08 | End: 2024-12-10 | Stop reason: HOSPADM

## 2024-12-08 RX ORDER — POTASSIUM CHLORIDE 1500 MG/1
40 TABLET, EXTENDED RELEASE ORAL PRN
Status: DISCONTINUED | OUTPATIENT
Start: 2024-12-08 | End: 2024-12-10 | Stop reason: HOSPADM

## 2024-12-08 RX ORDER — ASPIRIN 81 MG/1
81 TABLET ORAL DAILY
Status: DISCONTINUED | OUTPATIENT
Start: 2024-12-09 | End: 2024-12-10 | Stop reason: HOSPADM

## 2024-12-08 RX ORDER — MAGNESIUM SULFATE HEPTAHYDRATE 40 MG/ML
2000 INJECTION, SOLUTION INTRAVENOUS PRN
Status: DISCONTINUED | OUTPATIENT
Start: 2024-12-08 | End: 2024-12-10 | Stop reason: HOSPADM

## 2024-12-08 RX ORDER — ONDANSETRON 4 MG/1
4 TABLET, ORALLY DISINTEGRATING ORAL EVERY 8 HOURS PRN
Status: DISCONTINUED | OUTPATIENT
Start: 2024-12-08 | End: 2024-12-10 | Stop reason: HOSPADM

## 2024-12-08 RX ORDER — ASPIRIN 81 MG/1
324 TABLET, CHEWABLE ORAL ONCE
Status: DISCONTINUED | OUTPATIENT
Start: 2024-12-08 | End: 2024-12-08

## 2024-12-08 RX ORDER — ONDANSETRON 2 MG/ML
4 INJECTION INTRAMUSCULAR; INTRAVENOUS EVERY 6 HOURS PRN
Status: DISCONTINUED | OUTPATIENT
Start: 2024-12-08 | End: 2024-12-10 | Stop reason: HOSPADM

## 2024-12-08 RX ORDER — ALLOPURINOL 100 MG/1
200 TABLET ORAL DAILY
Status: DISCONTINUED | OUTPATIENT
Start: 2024-12-09 | End: 2024-12-10 | Stop reason: HOSPADM

## 2024-12-08 RX ORDER — POTASSIUM CHLORIDE 7.45 MG/ML
10 INJECTION INTRAVENOUS PRN
Status: DISCONTINUED | OUTPATIENT
Start: 2024-12-08 | End: 2024-12-10 | Stop reason: HOSPADM

## 2024-12-08 RX ORDER — POLYETHYLENE GLYCOL 3350 17 G/17G
17 POWDER, FOR SOLUTION ORAL DAILY PRN
Status: DISCONTINUED | OUTPATIENT
Start: 2024-12-08 | End: 2024-12-10 | Stop reason: HOSPADM

## 2024-12-08 RX ORDER — AMLODIPINE BESYLATE 5 MG/1
5 TABLET ORAL DAILY
Status: DISCONTINUED | OUTPATIENT
Start: 2024-12-09 | End: 2024-12-10 | Stop reason: HOSPADM

## 2024-12-08 RX ORDER — ONDANSETRON 2 MG/ML
8 INJECTION INTRAMUSCULAR; INTRAVENOUS ONCE
Status: COMPLETED | OUTPATIENT
Start: 2024-12-08 | End: 2024-12-08

## 2024-12-08 RX ORDER — DEXTROSE MONOHYDRATE 100 MG/ML
INJECTION, SOLUTION INTRAVENOUS CONTINUOUS PRN
Status: DISCONTINUED | OUTPATIENT
Start: 2024-12-08 | End: 2024-12-10 | Stop reason: HOSPADM

## 2024-12-08 RX ORDER — PANTOPRAZOLE SODIUM 40 MG/1
40 TABLET, DELAYED RELEASE ORAL
Status: DISCONTINUED | OUTPATIENT
Start: 2024-12-09 | End: 2024-12-10 | Stop reason: HOSPADM

## 2024-12-08 RX ORDER — ENOXAPARIN SODIUM 100 MG/ML
30 INJECTION SUBCUTANEOUS 2 TIMES DAILY
Status: DISCONTINUED | OUTPATIENT
Start: 2024-12-08 | End: 2024-12-10 | Stop reason: HOSPADM

## 2024-12-08 RX ORDER — ACETAMINOPHEN 650 MG/1
650 SUPPOSITORY RECTAL EVERY 6 HOURS PRN
Status: DISCONTINUED | OUTPATIENT
Start: 2024-12-08 | End: 2024-12-10 | Stop reason: HOSPADM

## 2024-12-08 RX ORDER — SODIUM CHLORIDE 0.9 % (FLUSH) 0.9 %
5-40 SYRINGE (ML) INJECTION EVERY 12 HOURS SCHEDULED
Status: DISCONTINUED | OUTPATIENT
Start: 2024-12-08 | End: 2024-12-10 | Stop reason: HOSPADM

## 2024-12-08 RX ORDER — SODIUM CHLORIDE 0.9 % (FLUSH) 0.9 %
5-40 SYRINGE (ML) INJECTION PRN
Status: DISCONTINUED | OUTPATIENT
Start: 2024-12-08 | End: 2024-12-10 | Stop reason: HOSPADM

## 2024-12-08 RX ORDER — SODIUM CHLORIDE 9 MG/ML
INJECTION, SOLUTION INTRAVENOUS PRN
Status: DISCONTINUED | OUTPATIENT
Start: 2024-12-08 | End: 2024-12-10 | Stop reason: HOSPADM

## 2024-12-08 RX ORDER — TRIAMTERENE AND HYDROCHLOROTHIAZIDE 75; 50 MG/1; MG/1
1 TABLET ORAL DAILY
Status: DISCONTINUED | OUTPATIENT
Start: 2024-12-09 | End: 2024-12-10 | Stop reason: HOSPADM

## 2024-12-08 RX ADMIN — ENOXAPARIN SODIUM 30 MG: 100 INJECTION SUBCUTANEOUS at 20:48

## 2024-12-08 RX ADMIN — SODIUM CHLORIDE: 0.9 INJECTION, SOLUTION INTRAVENOUS at 18:32

## 2024-12-08 RX ADMIN — ONDANSETRON 8 MG: 2 INJECTION, SOLUTION INTRAMUSCULAR; INTRAVENOUS at 14:04

## 2024-12-08 ASSESSMENT — PAIN SCALES - GENERAL
PAINLEVEL_OUTOF10: 5
PAINLEVEL_OUTOF10: 0

## 2024-12-08 ASSESSMENT — PAIN - FUNCTIONAL ASSESSMENT: PAIN_FUNCTIONAL_ASSESSMENT: 0-10

## 2024-12-08 ASSESSMENT — PAIN DESCRIPTION - LOCATION: LOCATION: CHEST

## 2024-12-08 ASSESSMENT — LIFESTYLE VARIABLES
HOW MANY STANDARD DRINKS CONTAINING ALCOHOL DO YOU HAVE ON A TYPICAL DAY: 1 OR 2
HOW OFTEN DO YOU HAVE A DRINK CONTAINING ALCOHOL: 2-4 TIMES A MONTH

## 2024-12-08 ASSESSMENT — HEART SCORE: ECG: NORMAL

## 2024-12-08 ASSESSMENT — PAIN DESCRIPTION - DESCRIPTORS: DESCRIPTORS: SORE

## 2024-12-08 NOTE — H&P
[facility]  Can Cassidy MD; Mack Bradford MD; Cortez Hinojosa MD; MD Gloria Adams MD; Ayala Wang MD; Faye Hess MD; Marlena Faye MD    [facility]   IN-PATIENT SERVICE   [facility]    HISTORY AND PHYSICAL EXAMINATION            Date:   12/8/2024  Patient name:  Kain Sanches  Date of admission:  12/8/2024  1:36 PM  MRN:   [id number]  Account:  [id number]  YOB: 1965  PCP:    Abhi Roach MD  Room:   02/02  Code Status:    Prior    Chief Complaint:     Chief Complaint   Patient presents with    Chest Pain    Vomiting       History Obtained From:     patient, electronic medical record    History of Present Illness:     Kain Sanches is a 59 y.o. Non- / non  male who presents with Chest Pain and Vomiting   and is admitted to the hospital for the management of Angina at rest (HCC).    Patient with past medical history of essential hypertension, hyperlipidemia, obesity, ALLEY, paroxysmal A-fib s/p PVI July 2023 presented complaining of chest pain which started this morning.  Patient ate outside last night.  Has been having nausea, vomiting and diarrhea since this morning.  Diarrhea is described as loose and watery.  No dark-colored stools.  Has been vomiting 4 times since this morning, no blood in the vomit.  Chest pain occurred after vomiting which has subsided.  In arrival to the ED, vitals were stable.  EKG showed sinus rhythm.  Elevated WBC.  Internal medicine consulted to admit patient for the management of angina.    Past Medical History:     Past Medical History:   Diagnosis Date    Atrial fibrillation (HCC)     [facility]    Cancer (HCC)     right arm, at site of right arm surgical scar    Chronic kidney disease 1975    H/O upper respiratory infection     Hearing loss     Hyperlipidemia     Hypertension     Kidney stones     Neuropathy 2018

## 2024-12-08 NOTE — ED NOTES
Report given to JEFF Carvalho from ED.   Report method in person   The following was reviewed with receiving RN:   Current vital signs:  /76   Pulse 89   Temp 98.2 °F (36.8 °C) (Oral)   Resp 17   Ht 2.007 m (6' 7\")   Wt (!) 149.7 kg (330 lb)   SpO2 96%   BMI 37.18 kg/m²                MEWS Score: 1     Any medication or safety alerts were reviewed. Any pending diagnostics and notifications were also reviewed, as well as any safety concerns or issues, abnormal labs, abnormal imaging, and abnormal assessment findings. Questions were answered.      
Report given to JEFF Welch from [facility].   Report method by phone   The following was reviewed with receiving RN:   Current vital signs:  /76   Pulse 89   Temp 98.2 °F (36.8 °C) (Oral)   Resp 17   Ht 2.007 m (6' 7\")   Wt (!) 149.7 kg (330 lb)   SpO2 96%   BMI 37.18 kg/m²                MEWS Score: 1     Any medication or safety alerts were reviewed. Any pending diagnostics and notifications were also reviewed, as well as any safety concerns or issues, abnormal labs, abnormal imaging, and abnormal assessment findings. Questions were answered.       
Pancytopenia

## 2024-12-08 NOTE — ED TRIAGE NOTES
Mode of arrival (squad #, walk in, police, etc) : Medic 42        Chief complaint(s): Chest pain, vomiting        Arrival Note (brief scenario, treatment PTA, etc).: Pt c/o midsternal chest pain and vomiting that started this morning. Pt states he started vomiting earlier today and then his chest started hurting. Pt describes the pain as a soreness. Pt took 4 baby aspirin PTA. Pt A&Ox4.         C= \"Have you ever felt that you should Cut down on your drinking?\"  No  A= \"Have people Annoyed you by criticizing your drinking?\"  No  G= \"Have you ever felt bad or Guilty about your drinking?\"  No  E= \"Have you ever had a drink as an Eye-opener first thing in the morning to steady your nerves or to help a hangover?\"  No      Deferred []      Reason for deferring: N/A    *If yes to two or more: probable alcohol abuse.*

## 2024-12-08 NOTE — ED PROVIDER NOTES
hernia     Varicose vein of leg     Wellness examination     Dr. NICKOLAS Roach-MD-PCP     SURGICAL HISTORY       Past Surgical History:   Procedure Laterality Date    ARM SURGERY Right     x5, tendon injury    ARM SURGERY Left 2018    extensor tendon repair    CARDIOVERSION      x2, says likely to have another in 2020    COLONOSCOPY      EYE MUSCLE SURGERY Left as a child    HERNIA REPAIR N/A 2019    XI LAPAROSCOPIC ROBOTIC UMBILICAL HERNIA REPAIR WITH MESH performed by Santino Vargas MD at [facility] OR    KIDNEY STONE SURGERY      KIDNEY SURGERY Right     ureter, born with 2 right kidneys    TONSILLECTOMY      UMBILICAL HERNIA REPAIR  2019    LAPAROSCOPIC ROBOTIC UMBILICAL HERNIA REPAIR WITH MESH     VASECTOMY       CURRENT MEDICATIONS       Current Discharge Medication List        CONTINUE these medications which have NOT CHANGED    Details   spironolactone (ALDACTONE) 25 MG tablet Take 1 tablet by mouth daily      meloxicam (MOBIC) 15 MG tablet Take 1 tablet by mouth daily  Qty: 30 tablet, Refills: 3      sildenafil (VIAGRA) 100 MG tablet Take 1 tablet by mouth daily as needed for Erectile Dysfunction  Qty: 20 tablet, Refills: 5    Associated Diagnoses: Erectile dysfunction, unspecified erectile dysfunction type      triamterene-hydroCHLOROthiazide (MAXZIDE) 75-50 MG per tablet Take 1 tablet by mouth daily  Qty: 90 tablet, Refills: 3      allopurinol (ZYLOPRIM) 100 MG tablet Take 100mg daily for 4 weeks, then 200mg daily  Qty: 180 tablet, Refills: 3      amLODIPine (NORVASC) 5 MG tablet Take 1 tablet by mouth daily      aspirin 81 MG EC tablet Take 1 tablet by mouth daily           ALLERGIES     has No Known Allergies.  FAMILY HISTORY     He indicated that his mother is . He indicated that his father is alive. He indicated that both of his sisters are alive. He indicated that his brother is alive. He indicated that his maternal grandmother is . He indicated that his

## 2024-12-09 LAB
ANION GAP SERPL CALCULATED.3IONS-SCNC: 12 MMOL/L (ref 9–16)
BASOPHILS # BLD: 0 K/UL (ref 0–0.2)
BASOPHILS NFR BLD: 0 % (ref 0–2)
BUN SERPL-MCNC: 19 MG/DL (ref 6–20)
C DIFF GDH + TOXINS A+B STL QL IA.RAPID: NEGATIVE
CALCIUM SERPL-MCNC: 8.5 MG/DL (ref 8.6–10.4)
CHLORIDE SERPL-SCNC: 100 MMOL/L (ref 98–107)
CO2 SERPL-SCNC: 24 MMOL/L (ref 20–31)
CREAT SERPL-MCNC: 1.1 MG/DL (ref 0.7–1.2)
EOSINOPHIL # BLD: 0.09 K/UL (ref 0–0.4)
EOSINOPHILS RELATIVE PERCENT: 1 % (ref 0–4)
ERYTHROCYTE [DISTWIDTH] IN BLOOD BY AUTOMATED COUNT: 14.9 % (ref 11.5–14.9)
GFR, ESTIMATED: 77 ML/MIN/1.73M2
GLUCOSE SERPL-MCNC: 111 MG/DL (ref 74–99)
HCT VFR BLD AUTO: 46.8 % (ref 41–53)
HGB BLD-MCNC: 15.8 G/DL (ref 13.5–17.5)
LYMPHOCYTES NFR BLD: 0.45 K/UL (ref 1–4.8)
LYMPHOCYTES RELATIVE PERCENT: 5 % (ref 24–44)
MAGNESIUM SERPL-MCNC: 1.9 MG/DL (ref 1.6–2.6)
MCH RBC QN AUTO: 29.8 PG (ref 26–34)
MCHC RBC AUTO-ENTMCNC: 33.8 G/DL (ref 31–37)
MCV RBC AUTO: 88.2 FL (ref 80–100)
MONOCYTES NFR BLD: 0.72 K/UL (ref 0.1–1.3)
MONOCYTES NFR BLD: 8 % (ref 1–7)
MORPHOLOGY: NORMAL
NEUTROPHILS NFR BLD: 86 % (ref 36–66)
NEUTS SEG NFR BLD: 7.74 K/UL (ref 1.3–9.1)
PLATELET # BLD AUTO: 223 K/UL (ref 150–450)
PMV BLD AUTO: 7.5 FL (ref 6–12)
POTASSIUM SERPL-SCNC: 3.4 MMOL/L (ref 3.7–5.3)
RBC # BLD AUTO: 5.3 M/UL (ref 4.5–5.9)
SODIUM SERPL-SCNC: 136 MMOL/L (ref 136–145)
SPECIMEN DESCRIPTION: NORMAL
WBC OTHER # BLD: 9 K/UL (ref 3.5–11)

## 2024-12-09 PROCEDURE — 2060000000 HC ICU INTERMEDIATE R&B

## 2024-12-09 PROCEDURE — 85025 COMPLETE CBC W/AUTO DIFF WBC: CPT

## 2024-12-09 PROCEDURE — 99233 SBSQ HOSP IP/OBS HIGH 50: CPT | Performed by: INTERNAL MEDICINE

## 2024-12-09 PROCEDURE — 6360000002 HC RX W HCPCS

## 2024-12-09 PROCEDURE — 83735 ASSAY OF MAGNESIUM: CPT

## 2024-12-09 PROCEDURE — 87506 IADNA-DNA/RNA PROBE TQ 6-11: CPT

## 2024-12-09 PROCEDURE — 80048 BASIC METABOLIC PNL TOTAL CA: CPT

## 2024-12-09 PROCEDURE — 2580000003 HC RX 258

## 2024-12-09 PROCEDURE — 36415 COLL VENOUS BLD VENIPUNCTURE: CPT

## 2024-12-09 PROCEDURE — 87449 NOS EACH ORGANISM AG IA: CPT

## 2024-12-09 PROCEDURE — 6370000000 HC RX 637 (ALT 250 FOR IP)

## 2024-12-09 PROCEDURE — 87324 CLOSTRIDIUM AG IA: CPT

## 2024-12-09 RX ADMIN — ENOXAPARIN SODIUM 30 MG: 100 INJECTION SUBCUTANEOUS at 08:21

## 2024-12-09 RX ADMIN — ALLOPURINOL 200 MG: 100 TABLET ORAL at 08:21

## 2024-12-09 RX ADMIN — TRIAMTERENE AND HYDROCHLOROTHIAZIDE 1 TABLET: 50; 75 TABLET ORAL at 08:21

## 2024-12-09 RX ADMIN — PANTOPRAZOLE SODIUM 40 MG: 40 TABLET, DELAYED RELEASE ORAL at 05:28

## 2024-12-09 RX ADMIN — ENOXAPARIN SODIUM 30 MG: 100 INJECTION SUBCUTANEOUS at 21:44

## 2024-12-09 RX ADMIN — SODIUM CHLORIDE, PRESERVATIVE FREE 10 ML: 5 INJECTION INTRAVENOUS at 21:44

## 2024-12-09 RX ADMIN — ASPIRIN 81 MG: 81 TABLET, COATED ORAL at 08:21

## 2024-12-09 RX ADMIN — AMLODIPINE BESYLATE 5 MG: 5 TABLET ORAL at 08:21

## 2024-12-09 RX ADMIN — POTASSIUM CHLORIDE 40 MEQ: 1500 TABLET, EXTENDED RELEASE ORAL at 18:38

## 2024-12-09 ASSESSMENT — PATIENT HEALTH QUESTIONNAIRE - PHQ9
2. FEELING DOWN, DEPRESSED OR HOPELESS: NOT AT ALL
1. LITTLE INTEREST OR PLEASURE IN DOING THINGS: NOT AT ALL
SUM OF ALL RESPONSES TO PHQ9 QUESTIONS 1 & 2: 0

## 2024-12-09 NOTE — PLAN OF CARE
Problem: Discharge Planning  Goal: Discharge to home or other facility with appropriate resources  12/9/2024 1540 by Ema Livingston RN  Outcome: Progressing     Problem: Pain  Goal: Verbalizes/displays adequate comfort level or baseline comfort level  12/9/2024 1540 by Ema Livingston RN  Outcome: Progressing     Problem: Gastrointestinal - Adult  Goal: Minimal or absence of nausea and vomiting  12/9/2024 1540 by Ema Livingston RN  Outcome: Progressing     Problem: Gastrointestinal - Adult  Goal: Maintains or returns to baseline bowel function  12/9/2024 1540 by Ema Livingston RN  Outcome: Progressing     Problem: Gastrointestinal - Adult  Goal: Maintains adequate nutritional intake  12/9/2024 1540 by Ema Livingston RN  Outcome: Progressing

## 2024-12-09 NOTE — CARE COORDINATION
Case Management Assessment  Initial Evaluation    Date/Time of Evaluation: 12/9/2024 9:31 AM  Assessment Completed by: Shannan Pierre RN    If patient is discharged prior to next notation, then this note serves as note for discharge by case management.    Patient Name: Kain Sanches                   YOB: 1965  Diagnosis: Angina at rest (HCC) [I20.89]  Chest pain, unspecified type [R07.9]                   Date / Time: 12/8/2024  1:36 PM    Patient Admission Status: Inpatient   Readmission Risk (Low < 19, Mod (19-27), High > 27): Readmission Risk Score: 5.1    Current PCP: Abhi Roach MD  PCP verified by CM? Yes    Chart Reviewed: Yes      History Provided by: Patient  Patient Orientation: Alert and Oriented    Patient Cognition: Alert    Hospitalization in the last 30 days (Readmission):  No    If yes, Readmission Assessment in CM Navigator will be completed.    Advance Directives:      Code Status: Full Code   Patient's Primary Decision Maker is:        Discharge Planning:    Patient lives with: Spouse/Significant Other Type of Home: House  Primary Care Giver: Self  Patient Support Systems include: Spouse/Significant Other   Current Financial resources: None  Current community resources: None  Current services prior to admission: C-pap            Current DME:              Type of Home Care services:  None    ADLS  Prior functional level: Independent in ADLs/IADLs  Current functional level: Independent in ADLs/IADLs    PT AM-PAC:   /24  OT AM-PAC:   /24    Family can provide assistance at DC: Yes  Would you like Case Management to discuss the discharge plan with any other family members/significant others, and if so, who?    Plans to Return to Present Housing: Yes  Other Identified Issues/Barriers to RETURNING to current housing: None  Potential Assistance needed at discharge: N/A            Potential DME:    Patient expects to discharge to: House  Plan for transportation at discharge:

## 2024-12-09 NOTE — CONSULTS
Date:   12/9/2024  Patient name: Kain Sanches  Date of admission:  12/8/2024  1:36 PM  MRN:   [id number]  YOB: 1965  PCP: Abhi Roach MD    Reason for Admission: Angina at rest (HCC) [I20.89]  Chest pain, unspecified type [R07.9]    Cardiology consult: Chest pain       Referring physician: Nitza Langley    Chest pain like a heavy pressure radiating to the left during intractable vomiting, no ischemic ECG changes, negative cardiac markers, no history of exertional angina  History of paroxysmal A-fib status post ablation July 2023 by Dr. Hdez at [facility], current rhythm is sinus rhythm  No history of coronary intervention  Hypertension  Hyperlipidemia  Thrombophlebitis, varicose veins  Neuropathy etiology not clear impaired sensation both feet  Morbid obesity BMI 37, sleep apnea  Renal stone    Past surgical history tendon repair right, hernia repair as a child left kidney surgery ureter both with a 2 right, tonsillectomy, umbilical hernia repair, vasectomy, cardioversion, colonoscopies, eye surgery    Family history of coronary artery disease his mother had a stroke and heart attack in her 50s    Drug allergy not known    History of present illness    59-year-old  male, retired  with a past medical history of paroxysmal A-fib, status post ablation, hypertension, hyperlipidemia hospitalized 12/8/2024 with chest pain and vomiting and diarrhea.  On the day of admission about 8:45 in the morning he developed acute onset of violent nausea, vomiting followed by diarrhea.  He had 4 episodes of emesis bile colored liquid, no food contents, no blood.  After vomiting he noticed some pressure in his mid chest that felt like a bubble.  Pain/discomfort radiated to his left arm.  Chest discomfort improved gradually.  He had mild diaphoresis.  No syncope  Blood pressure on admission 152/86 heart rate 94 oxygen saturation 97%  ECG on admission normal sinus rhythm heart

## 2024-12-09 NOTE — PLAN OF CARE
Problem: Discharge Planning  Goal: Discharge to home or other facility with appropriate resources  12/9/2024 0506 by Brittany Collins, RN  Outcome: Progressing  12/8/2024 1929 by Yuri Wright RN  Outcome: Progressing     Problem: Pain  Goal: Verbalizes/displays adequate comfort level or baseline comfort level  12/9/2024 0506 by Brittany Collins RN  Outcome: Progressing  12/8/2024 1929 by Yuri Wright RN  Outcome: Progressing     Problem: Gastrointestinal - Adult  Goal: Minimal or absence of nausea and vomiting  Reactivated  Goal: Maintains or returns to baseline bowel function  Reactivated  Goal: Maintains adequate nutritional intake  Reactivated

## 2024-12-10 ENCOUNTER — APPOINTMENT (OUTPATIENT)
Dept: NUCLEAR MEDICINE | Age: 59
DRG: 392 | End: 2024-12-10
Payer: COMMERCIAL

## 2024-12-10 ENCOUNTER — HOSPITAL ENCOUNTER (INPATIENT)
Age: 59
Discharge: HOME | DRG: 392 | End: 2024-12-12
Payer: COMMERCIAL

## 2024-12-10 VITALS
HEIGHT: 78 IN | RESPIRATION RATE: 18 BRPM | OXYGEN SATURATION: 96 % | DIASTOLIC BLOOD PRESSURE: 66 MMHG | BODY MASS INDEX: 36.45 KG/M2 | SYSTOLIC BLOOD PRESSURE: 125 MMHG | TEMPERATURE: 97.7 F | HEART RATE: 66 BPM | WEIGHT: 315 LBS

## 2024-12-10 LAB
ANION GAP SERPL CALCULATED.3IONS-SCNC: 11 MMOL/L (ref 9–16)
BASOPHILS # BLD: 0.1 K/UL (ref 0–0.2)
BASOPHILS NFR BLD: 1 % (ref 0–2)
BUN SERPL-MCNC: 15 MG/DL (ref 6–20)
CALCIUM SERPL-MCNC: 8.6 MG/DL (ref 8.6–10.4)
CHLORIDE SERPL-SCNC: 103 MMOL/L (ref 98–107)
CO2 SERPL-SCNC: 25 MMOL/L (ref 20–31)
CREAT SERPL-MCNC: 1.2 MG/DL (ref 0.7–1.2)
ECHO BSA: 2.89 M2
EKG ATRIAL RATE: 91 BPM
EKG P AXIS: 38 DEGREES
EKG P-R INTERVAL: 160 MS
EKG Q-T INTERVAL: 350 MS
EKG QRS DURATION: 86 MS
EKG QTC CALCULATION (BAZETT): 430 MS
EKG R AXIS: 25 DEGREES
EKG T AXIS: 15 DEGREES
EKG VENTRICULAR RATE: 91 BPM
EOSINOPHIL # BLD: 0.1 K/UL (ref 0–0.4)
EOSINOPHILS RELATIVE PERCENT: 1 % (ref 0–4)
ERYTHROCYTE [DISTWIDTH] IN BLOOD BY AUTOMATED COUNT: 14.9 % (ref 11.5–14.9)
GFR, ESTIMATED: 70 ML/MIN/1.73M2
GLUCOSE SERPL-MCNC: 101 MG/DL (ref 74–99)
HCT VFR BLD AUTO: 44.4 % (ref 41–53)
HGB BLD-MCNC: 15.1 G/DL (ref 13.5–17.5)
LYMPHOCYTES NFR BLD: 1.8 K/UL (ref 1–4.8)
LYMPHOCYTES RELATIVE PERCENT: 20 % (ref 24–44)
MAGNESIUM SERPL-MCNC: 2.1 MG/DL (ref 1.6–2.6)
MCH RBC QN AUTO: 30.2 PG (ref 26–34)
MCHC RBC AUTO-ENTMCNC: 34.1 G/DL (ref 31–37)
MCV RBC AUTO: 88.5 FL (ref 80–100)
MONOCYTES NFR BLD: 1.1 K/UL (ref 0.1–1.3)
MONOCYTES NFR BLD: 12 % (ref 1–7)
NEUTROPHILS NFR BLD: 66 % (ref 36–66)
NEUTS SEG NFR BLD: 5.8 K/UL (ref 1.3–9.1)
NUC STRESS EJECTION FRACTION: 54 %
PLATELET # BLD AUTO: 217 K/UL (ref 150–450)
PMV BLD AUTO: 7.4 FL (ref 6–12)
POTASSIUM SERPL-SCNC: 3.4 MMOL/L (ref 3.7–5.3)
RBC # BLD AUTO: 5.02 M/UL (ref 4.5–5.9)
SODIUM SERPL-SCNC: 139 MMOL/L (ref 136–145)
STRESS BASELINE DIAS BP: 85 MMHG
STRESS BASELINE HR: 70 BPM
STRESS BASELINE SYS BP: 147 MMHG
STRESS ESTIMATED WORKLOAD: 1 METS
STRESS PEAK DIAS BP: 86 MMHG
STRESS PEAK SYS BP: 171 MMHG
STRESS PERCENT HR ACHIEVED: 52 %
STRESS POST PEAK HR: 84 BPM
STRESS RATE PRESSURE PRODUCT: NORMAL BPM*MMHG
STRESS ST DEPRESSION: 0 MM
STRESS STAGE RECOVERY 1 BP: NORMAL MMHG
STRESS STAGE RECOVERY 1 DURATION: 1 MIN:SEC
STRESS STAGE RECOVERY 1 HR: 84 BPM
STRESS STAGE RECOVERY 2 BP: NORMAL MMHG
STRESS STAGE RECOVERY 2 DURATION: 6 MIN:SEC
STRESS STAGE RECOVERY 2 HR: 82 BPM
STRESS STAGE RECOVERY 3 BP: NORMAL MMHG
STRESS STAGE RECOVERY 3 DURATION: 9 MIN:SEC
STRESS STAGE RECOVERY 3 HR: 78 BPM
STRESS TARGET HR: 161 BPM
TID: 0.92
WBC OTHER # BLD: 8.8 K/UL (ref 3.5–11)

## 2024-12-10 PROCEDURE — 2580000003 HC RX 258

## 2024-12-10 PROCEDURE — 93010 ELECTROCARDIOGRAM REPORT: CPT | Performed by: INTERNAL MEDICINE

## 2024-12-10 PROCEDURE — 85025 COMPLETE CBC W/AUTO DIFF WBC: CPT

## 2024-12-10 PROCEDURE — 6370000000 HC RX 637 (ALT 250 FOR IP): Performed by: INTERNAL MEDICINE

## 2024-12-10 PROCEDURE — 80048 BASIC METABOLIC PNL TOTAL CA: CPT

## 2024-12-10 PROCEDURE — 78452 HT MUSCLE IMAGE SPECT MULT: CPT

## 2024-12-10 PROCEDURE — 93017 CV STRESS TEST TRACING ONLY: CPT

## 2024-12-10 PROCEDURE — 99232 SBSQ HOSP IP/OBS MODERATE 35: CPT | Performed by: INTERNAL MEDICINE

## 2024-12-10 PROCEDURE — 6360000002 HC RX W HCPCS: Performed by: INTERNAL MEDICINE

## 2024-12-10 PROCEDURE — A9500 TC99M SESTAMIBI: HCPCS | Performed by: INTERNAL MEDICINE

## 2024-12-10 PROCEDURE — 2580000003 HC RX 258: Performed by: INTERNAL MEDICINE

## 2024-12-10 PROCEDURE — 36415 COLL VENOUS BLD VENIPUNCTURE: CPT

## 2024-12-10 PROCEDURE — 83735 ASSAY OF MAGNESIUM: CPT

## 2024-12-10 PROCEDURE — 6370000000 HC RX 637 (ALT 250 FOR IP)

## 2024-12-10 PROCEDURE — 6360000002 HC RX W HCPCS

## 2024-12-10 PROCEDURE — 3430000000 HC RX DIAGNOSTIC RADIOPHARMACEUTICAL: Performed by: INTERNAL MEDICINE

## 2024-12-10 RX ORDER — SODIUM CHLORIDE 0.9 % (FLUSH) 0.9 %
5-40 SYRINGE (ML) INJECTION PRN
Status: ACTIVE | OUTPATIENT
Start: 2024-12-10 | End: 2024-12-10

## 2024-12-10 RX ORDER — METOPROLOL TARTRATE 1 MG/ML
5 INJECTION, SOLUTION INTRAVENOUS EVERY 5 MIN PRN
Status: ACTIVE | OUTPATIENT
Start: 2024-12-10 | End: 2024-12-10

## 2024-12-10 RX ORDER — PANTOPRAZOLE SODIUM 40 MG/1
40 TABLET, DELAYED RELEASE ORAL
Qty: 30 TABLET | Refills: 3 | Status: SHIPPED | OUTPATIENT
Start: 2024-12-11

## 2024-12-10 RX ORDER — TETRAKIS(2-METHOXYISOBUTYLISOCYANIDE)COPPER(I) TETRAFLUOROBORATE 1 MG/ML
15 INJECTION, POWDER, LYOPHILIZED, FOR SOLUTION INTRAVENOUS
Status: COMPLETED | OUTPATIENT
Start: 2024-12-10 | End: 2024-12-10

## 2024-12-10 RX ORDER — SODIUM CHLORIDE 9 MG/ML
500 INJECTION, SOLUTION INTRAVENOUS CONTINUOUS PRN
Status: ACTIVE | OUTPATIENT
Start: 2024-12-10 | End: 2024-12-10

## 2024-12-10 RX ORDER — NITROGLYCERIN 0.4 MG/1
0.4 TABLET SUBLINGUAL EVERY 5 MIN PRN
Status: ACTIVE | OUTPATIENT
Start: 2024-12-10 | End: 2024-12-10

## 2024-12-10 RX ORDER — POTASSIUM CHLORIDE 1500 MG/1
20 TABLET, EXTENDED RELEASE ORAL 2 TIMES DAILY
Qty: 6 TABLET | Refills: 0 | Status: SHIPPED | OUTPATIENT
Start: 2024-12-10

## 2024-12-10 RX ORDER — AMINOPHYLLINE 25 MG/ML
50 INJECTION, SOLUTION INTRAVENOUS PRN
Status: ACTIVE | OUTPATIENT
Start: 2024-12-10 | End: 2024-12-10

## 2024-12-10 RX ORDER — SODIUM CHLORIDE 0.9 % (FLUSH) 0.9 %
10 SYRINGE (ML) INJECTION PRN
Status: DISCONTINUED | OUTPATIENT
Start: 2024-12-10 | End: 2024-12-10 | Stop reason: HOSPADM

## 2024-12-10 RX ORDER — ALBUTEROL SULFATE 90 UG/1
2 INHALANT RESPIRATORY (INHALATION) PRN
Status: ACTIVE | OUTPATIENT
Start: 2024-12-10 | End: 2024-12-10

## 2024-12-10 RX ORDER — REGADENOSON 0.08 MG/ML
0.4 INJECTION, SOLUTION INTRAVENOUS
Status: COMPLETED | OUTPATIENT
Start: 2024-12-10 | End: 2024-12-10

## 2024-12-10 RX ORDER — TETRAKIS(2-METHOXYISOBUTYLISOCYANIDE)COPPER(I) TETRAFLUOROBORATE 1 MG/ML
30 INJECTION, POWDER, LYOPHILIZED, FOR SOLUTION INTRAVENOUS
Status: COMPLETED | OUTPATIENT
Start: 2024-12-10 | End: 2024-12-10

## 2024-12-10 RX ORDER — LACTOBACILLUS RHAMNOSUS GG 10B CELL
1 CAPSULE ORAL
Status: DISCONTINUED | OUTPATIENT
Start: 2024-12-10 | End: 2024-12-10 | Stop reason: HOSPADM

## 2024-12-10 RX ORDER — ATROPINE SULFATE 0.1 MG/ML
0.5 INJECTION INTRAVENOUS EVERY 5 MIN PRN
Status: ACTIVE | OUTPATIENT
Start: 2024-12-10 | End: 2024-12-10

## 2024-12-10 RX ADMIN — POTASSIUM CHLORIDE 40 MEQ: 1500 TABLET, EXTENDED RELEASE ORAL at 14:10

## 2024-12-10 RX ADMIN — Medication 16.8 MILLICURIE: at 09:47

## 2024-12-10 RX ADMIN — Medication 35.3 MILLICURIE: at 13:04

## 2024-12-10 RX ADMIN — SODIUM CHLORIDE: 0.9 INJECTION, SOLUTION INTRAVENOUS at 07:32

## 2024-12-10 RX ADMIN — Medication 1 CAPSULE: at 17:21

## 2024-12-10 RX ADMIN — ENOXAPARIN SODIUM 30 MG: 100 INJECTION SUBCUTANEOUS at 12:37

## 2024-12-10 RX ADMIN — TRIAMTERENE AND HYDROCHLOROTHIAZIDE 1 TABLET: 50; 75 TABLET ORAL at 12:36

## 2024-12-10 RX ADMIN — SODIUM CHLORIDE, PRESERVATIVE FREE 10 ML: 5 INJECTION INTRAVENOUS at 13:04

## 2024-12-10 RX ADMIN — ASPIRIN 81 MG: 81 TABLET, COATED ORAL at 12:36

## 2024-12-10 RX ADMIN — Medication 1 CAPSULE: at 12:36

## 2024-12-10 RX ADMIN — REGADENOSON 0.4 MG: 0.08 INJECTION, SOLUTION INTRAVENOUS at 09:46

## 2024-12-10 RX ADMIN — AMLODIPINE BESYLATE 5 MG: 5 TABLET ORAL at 12:36

## 2024-12-10 RX ADMIN — ALLOPURINOL 200 MG: 100 TABLET ORAL at 12:36

## 2024-12-10 NOTE — PLAN OF CARE
Problem: Discharge Planning  Goal: Discharge to home or other facility with appropriate resources  12/10/2024 1423 by Ema Livingston RN  Outcome: Progressing     Problem: Pain  Goal: Verbalizes/displays adequate comfort level or baseline comfort level  12/10/2024 1423 by Ema Livingston RN  Outcome: Progressing     Problem: Gastrointestinal - Adult  Goal: Minimal or absence of nausea and vomiting  12/10/2024 1423 by Ema Livingston RN  Outcome: Progressing     Problem: Gastrointestinal - Adult  Goal: Maintains or returns to baseline bowel function  Outcome: Progressing     Problem: Gastrointestinal - Adult  Goal: Maintains adequate nutritional intake  Outcome: Progressing     Problem: Safety - Adult  Goal: Free from fall injury  12/10/2024 1423 by Ema Livingston RN  Outcome: Progressing

## 2024-12-10 NOTE — CARE COORDINATION
ONGOING DISCHARGE PLAN:    Patient was out of room for Stress Test.     Spoke with patient's Wife, Bernice, regarding discharge plan and she confirms that plan is still for him to return to home w/ her.     She denies VNS at this time.     Bernice, does have concerns about pt's Diarrhea he has been having. Writer did Notify, Nurse, Ema & she states \"she was aware & will talk to the Dr about it today\".     Lipase was 66 on 12/8/24.    PT/OT on board.     Echo, Cardio. Will follow for any Rec/Needs.     Will continue to follow for additional discharge needs.    If patient is discharged prior to next notation, then this note serves as note for discharge by case management.    Electronically signed by Shannan Pierre RN on 12/10/2024 at 9:34 AM

## 2024-12-10 NOTE — PROGRESS NOTES
Nutrition Note    Pt to have stress test and possible discharge today. Will continue Regular diet and follow with full assessment if pt is not discharged.    Electronically signed by Mariela Ge RD, VIVIANE on 12/10/24 at 2:31 PM EST    Contact: [phone]    
     [facility] Internal Medicine  Can Cassidy MD; Mack Bradford MD; Cortez Hinojosa MD; MD Gloria Adams MD; Ayala Wang MD; Faye Hess MD; Marlena Faye MD    [facility] Internal Medicine   IN-PATIENT SERVICE   [facility]    HISTORY AND PHYSICAL EXAMINATION            Date:   12/10/2024  Patient name:  Kain Sanches  Date of admission:  12/8/2024  1:36 PM  MRN:   [id number]  Account:  [id number]  YOB: 1965  PCP:    Abhi Roach MD  Room:   2096/2096-01  Code Status:    Full Code    Chief Complaint:     Chief Complaint   Patient presents with    Chest Pain    Vomiting       History Obtained From:     patient, electronic medical record    History of Present Illness:     Kain Sanches is a 59 y.o. Non- / non  male who presents with Chest Pain and Vomiting   and is admitted to the hospital for the management of Angina at rest (HCC).    Patient with past medical history of essential hypertension, hyperlipidemia, obesity, ALLEY, paroxysmal A-fib s/p PVI July 2023 presented complaining of chest pain which started this morning.  Patient ate outside last night.  Has been having nausea, vomiting and diarrhea since this morning.  Diarrhea is described as loose and watery.  No dark-colored stools.  Has been vomiting 4 times since this morning, no blood in the vomit.  Chest pain occurred after vomiting which has subsided.  In arrival to the ED, vitals were stable.  EKG showed sinus rhythm.  Elevated WBC.  Internal medicine consulted to admit patient for the management of angina.    Past Medical History:     Past Medical History:   Diagnosis Date    Atrial fibrillation (HCC)     [facility]    Cancer (HCC)     right arm, at site of right arm surgical scar    Chronic kidney disease 1975    H/O upper respiratory infection     Hearing loss     Hyperlipidemia     Hypertension     Kidney stones     
Date:   12/10/2024  Patient name: Kain Sanches  Date of admission:  12/8/2024  1:36 PM  MRN:   [id number]  YOB: 1965  PCP: Abhi Roach MD    Reason for Admission: Angina at rest (HCC) [I20.89]  Chest pain, unspecified type [R07.9]    Cardiology follow-up: Chest pain       Referring physician: Nitza Langley     Chest pain like a heavy pressure radiating to the left during intractable vomiting, no ischemic ECG changes, negative cardiac markers, no history of exertional angina  History of paroxysmal A-fib status post ablation July 2023 by Dr. Hdez at [facility], current rhythm is sinus rhythm  No history of coronary intervention  Hypertension  Hyperlipidemia  Thrombophlebitis, varicose veins  Neuropathy etiology not clear impaired sensation both feet  Morbid obesity BMI 37, sleep apnea  Renal stone     Past surgical history tendon repair right, hernia repair as a child left kidney surgery ureter both with a 2 right, tonsillectomy, umbilical hernia repair, vasectomy, cardioversion, colonoscopies, eye surgery     Family history of coronary artery disease his mother had a stroke and heart attack in her 50s    Drug allergy not known     History of present illness     59-year-old  male, retired  with a past medical history of paroxysmal A-fib, status post ablation, hypertension, hyperlipidemia hospitalized 12/8/2024 with chest pain and vomiting and diarrhea.  On the day of admission about 8:45 in the morning he developed acute onset of violent nausea, vomiting followed by diarrhea.  He had 4 episodes of emesis bile colored liquid, no food contents, no blood.  After vomiting he noticed some pressure in his mid chest that felt like a bubble.  Pain/discomfort radiated to his left arm.  Chest discomfort improved gradually.  He had mild diaphoresis.  No syncope  Blood pressure on admission 152/86 heart rate 94 oxygen saturation 97%  ECG on admission normal sinus 
Dr. Dasilva (Cardiology) put on patient list for consult.  
Physical Therapy        Physical Therapy Cancel Note      DATE: 12/10/2024    NAME: Kain Sanches  MRN: [id number]   : 1965      Patient not seen this date for Physical Therapy due to:    Patient independent with functional mobility. Will defer PT evaluation at this time. Please reorder PT if future needs arise.       Electronically signed by Afua Shah PT on 12/10/2024 at 10:56 AM     
Spiritual Health History and Assessment/Progress Note  [facility]    (P) Spiritual/Emotional Needs,  ,  ,      Name: Kain Sanches MRN: [id number]    Age: 59 y.o.     Sex: male   Language: English   Yarsanism: Methodist   Angina at rest (HCC)     Date: 12/9/2024                           Spiritual Assessment began in [facility] PROGRESSIVE CARE        Referral/Consult From: (P) Nurse   Encounter Overview/Reason: (P) Spiritual/Emotional Needs  Service Provided For: (P) Patient and family together    PT waiting anxiously for the stress test tomorrow. Welcomed prayer.  [place], Belief, Meaning:   Patient identifies as spiritual  Family/Friends identify as spiritual      Importance and Influence:  Patient has spiritual/personal beliefs that influence decisions regarding their health  Family/Friends have spiritual/personal beliefs that influence decisions regarding the patient's health    Community:  Patient feels well-supported. Support system includes: Spouse/Partner  Family/Friends feel well-supported. Support system includes: Spouse/Partner    Assessment and Plan of Care:     Patient Interventions include: Facilitated expression of thoughts and feelings, Explored spiritual coping/struggle/distress, and Affirmed coping skills/support systems  Family/Friends Interventions include: Facilitated expression of thoughts and feelings, Explored spiritual coping/struggle/distress, and Affirmed coping skills/support systems    Patient Plan of Care: Spiritual Care available upon further referral  Family/Friends Plan of Care: Spiritual Care available upon further referral    Electronically signed by Chaplain Kemi on 12/9/2024 at 3:46 PM    
[facility]   OCCUPATIONAL THERAPY MISSED TREATMENT NOTE   INPATIENT   Date: 24  Patient Name: Kain Sanches       Room:   MRN: [id number]   Account #: [id number]    : 1965  (59 y.o.)  Gender: male     REASON FOR MISSED TREATMENT:  Pt reports that he is IND with self-care and mobility, RN confirms.  No need for skilled OT services at this time.    -   OT being discontinued at this time. Patient functioning at Premorbid Level  No further needs  1423    Electronically signed by CAITLIN Robertson on 24 at 2:22 PM EST    
down    59-year-old male presented with nausea, vomiting, diarrhea and angina.  Admitted for the management of angina at rest.  Atypical chest pain.  S/p loading dose of aspirin.  Likely secondary to persistent nausea/vomiting.  X-ray negative.  Cardio consult.  Gastroenteritis.  Check C. difficile, GI panel.  No recent antibiotic use.  Continue supportive treatment for now.  Paroxysmal A-fib s/p PVI.  Currently in sinus rhythm.  Rate controlled.  ALLEY.  Patient will bring his home CPAP.  Hypertension.  Controlled.  DVT prophylaxis.  Lovenox      12/9  Chest pain resolved, vital stable  CBC BMP okay, low potassium-replacing  Has had 2 episodes of loose BM earlier today, no vomiting for 24 hours now.  History of outside food intake prior to onset of symptoms-vomiting diarrhea likely secondary to acute gastroenteritis.  Resuming soft diet  Ordering stress test for tomorrow        Consultations:   IP CONSULT TO INTERNAL MEDICINE  IP CONSULT TO CASE MANAGEMENT  IP CONSULT TO CARDIOLOGY  IP CONSULT TO SPIRITUAL SERVICES      Patient is admitted as inpatient status because of co-morbidities listed above, severity of signs and symptoms as outlined, requirement for current medical therapies and most importantly because of direct risk to patient if care not provided in a hospital setting.  Expected length of stay > 48 hours.    Gloria Woodard MD  12/9/2024  2:06 PM    Copy sent to Dr. Roach, Abhi Arrieta MD    Please note that this chart was generated using voice recognition Dragon dictation software.  Although every effort was made to ensure the accuracy of this automated transcription, some errors in transcription may have occurred.

## 2024-12-10 NOTE — DISCHARGE INSTR - COC
example:835770203}  Last Modified Barium Swallow with Video (Video Swallowing Test): {Done Not Done Date:}    Treatments at the Time of Hospital Discharge:   Respiratory Treatments: ***  Oxygen Therapy:  {Therapy; copd oxygen:03021}  Ventilator:    {[facility] Vent List:227098920}    Rehab Therapies: {THERAPEUTIC INTERVENTION:6078405535}  Weight Bearing Status/Restrictions: {[facility] Weight Bearin}  Other Medical Equipment (for information only, NOT a DME order):  {EQUIPMENT:869540952}  Other Treatments: ***    Patient's personal belongings (please select all that are sent with patient):  {P DME Belongings:338188244}    RN SIGNATURE:  {Esignature:303712581}    CASE MANAGEMENT/SOCIAL WORK SECTION    Inpatient Status Date: ***    Readmission Risk Assessment Score:  Readmission Risk              Risk of Unplanned Readmission:  7           Discharging to Facility/ Agency   Name:   Address:  Phone:  Fax:    Dialysis Facility (if applicable)   Name:  Address:  Dialysis Schedule:  Phone:  Fax:    / signature: {Esignature:046846403}    PHYSICIAN SECTION    Prognosis: {Prognosis:5540605820}    Condition at Discharge: { Patient Condition:493882557}    Rehab Potential (if transferring to Rehab): {Prognosis:4217651146}    Recommended Labs or Other Treatments After Discharge: ***    Physician Certification: I certify the above information and transfer of Kain Sanches  is necessary for the continuing treatment of the diagnosis listed and that he requires {Admit to Appropriate Level of Care:74982} for {GREATER/LESS:693554276} 30 days.     Update Admission H&P: {CHP DME Changes in HandP:110724075}    PHYSICIAN SIGNATURE:  Electronically signed by Gloria Woodard MD on 12/10/24 at 4:56 PM EST

## 2024-12-10 NOTE — PLAN OF CARE
Problem: Discharge Planning  Goal: Discharge to home or other facility with appropriate resources  12/10/2024 1710 by Ema Livingston RN  Outcome: Adequate for Discharge  12/10/2024 1423 by Ema Livingston RN  Outcome: Progressing  12/10/2024 0317 by Nany Cruz RN  Outcome: Progressing  Flowsheets (Taken 12/10/2024 0317)  Discharge to home or other facility with appropriate resources: Identify barriers to discharge with patient and caregiver     Problem: Pain  Goal: Verbalizes/displays adequate comfort level or baseline comfort level  12/10/2024 1710 by Ema Livingston RN  Outcome: Adequate for Discharge  12/10/2024 1423 by Ema Livingston RN  Outcome: Progressing  12/10/2024 0317 by Nany Cruz RN  Outcome: Progressing     Problem: Gastrointestinal - Adult  Goal: Minimal or absence of nausea and vomiting  12/10/2024 1710 by Ema Livingston RN  Outcome: Adequate for Discharge  12/10/2024 1423 by Ema Livingston RN  Outcome: Progressing  12/10/2024 0317 by Nany Cruz RN  Outcome: Progressing  Goal: Maintains or returns to baseline bowel function  12/10/2024 1710 by Ema Livingston RN  Outcome: Adequate for Discharge  12/10/2024 1423 by Ema Livingston RN  Outcome: Progressing  Goal: Maintains adequate nutritional intake  12/10/2024 1710 by Ema Livingston RN  Outcome: Adequate for Discharge  12/10/2024 1423 by Ema Livingston RN  Outcome: Progressing     Problem: Safety - Adult  Goal: Free from fall injury  12/10/2024 1710 by Ema Livingston RN  Outcome: Adequate for Discharge  12/10/2024 1423 by Ema Livingston RN  Outcome: Progressing  12/10/2024 0317 by Nany Cruz RN  Outcome: Progressing  Flowsheets (Taken 12/10/2024 0317)  Free From Fall Injury:   Instruct family/caregiver on patient safety   Based on caregiver fall risk screen, instruct family/caregiver to ask for assistance with transferring infant if caregiver noted to have fall risk factors  Note: The patient remained free from falls this shift, call light within

## 2024-12-10 NOTE — PLAN OF CARE
Problem: Discharge Planning  Goal: Discharge to home or other facility with appropriate resources  12/10/2024 0317 by Nany Cruz RN  Outcome: Progressing  Flowsheets (Taken 12/10/2024 0317)  Discharge to home or other facility with appropriate resources: Identify barriers to discharge with patient and caregiver     Problem: Pain  Goal: Verbalizes/displays adequate comfort level or baseline comfort level  12/10/2024 0317 by Nany Cruz RN  Outcome: Progressing     Problem: Gastrointestinal - Adult  Goal: Minimal or absence of nausea and vomiting  12/10/2024 0317 by Nany Cruz RN  Outcome: Progressing     Problem: Safety - Adult  Goal: Free from fall injury  Outcome: Progressing  Flowsheets (Taken 12/10/2024 0317)  Free From Fall Injury:   Instruct family/caregiver on patient safety   Based on caregiver fall risk screen, instruct family/caregiver to ask for assistance with transferring infant if caregiver noted to have fall risk factors  Note: The patient remained free from falls this shift, call light within reach, bed in locked and lowest position.  Side rails up x2.      Problem: ABCDS Injury Assessment  Goal: Absence of physical injury  Outcome: Progressing  Flowsheets (Taken 12/10/2024 0317)  Absence of Physical Injury: Implement safety measures based on patient assessment  Note: Patient remained free from injury this shift. Call light within reach. Bed locked and in lowest position. Side rails x2. Pathways clear.

## 2024-12-11 ENCOUNTER — TELEPHONE (OUTPATIENT)
Dept: PRIMARY CARE CLINIC | Age: 59
End: 2024-12-11

## 2024-12-11 NOTE — TELEPHONE ENCOUNTER
"18 yr old male with asthma who presents as a transfer from an outside hospital for evaluation of seizures and behavioral change.   History obtained from patient's father. Prior to júnior time in 2017, patient reported to not have had any neurologic or psychiatric symptoms. Since Christmas, father reports atleast 10 events where he was taken to the local ED for GTC. Most recently, he was seen at a behavioral health center for delusional thoughts. Per chart review - he was reported to have been "looking around at the walls, and watching something in the room that wasn't there. He was not experiencing HI/SI, but was having delusions (thought people were coming to kill him)". No prior psychiatric history.      Per chart review, he was seen at [facility] ED on 01/17/2018 after having an event that was described as generalized shaking with LOC and confusion after. Per the father, who witnessed one of these events reports that it seems to start with contraction and shaking of the right side with head deviated to the right followed by contraction of the left side and LOC. It has in the past been associated with tongue biting and bowel incontinence. Event lasts about 1 min followed by confusion, lethargy and muscle soreness for lasting anywhere from 10 min to an hour.   Pt lives with the father, however more recently has been seeing his mother more often since christmas with reported concerns by the father of drug abuse by his mother however insists that patient does not use street or prescription drugs. Tox screen here negative. On 2/2/18, he was seen by Dr. Markham with Neurology who ordered an MRI brain, EEG and started patient on Keppra 500mg bid. MRI brain - done with visualization of the medial temporal lobes did not show any abnormalities.     Since admit, patient reported to be intermittently confused, having tangential thoughts and delusions. He is currently PEC'd and psychiatry is consulted.  " Care Transitions Initial Follow Up Call    Outreach made within 2 business days of discharge: Yes    Patient: Kain Sanches Patient : 1965   MRN: [id number]  Reason for Admission: angina  Discharge Date: 12/10/24       Spoke with: pt    Discharge department/facility: [facility] Interactive Patient Contact:  Was patient able to fill all prescriptions: Yes  Was patient instructed to bring all medications to the follow-up visit: Yes  Is patient taking all medications as directed in the discharge summary? Yes  Does patient understand their discharge instructions: Yes  Does patient have questions or concerns that need addressed prior to 7-14 day follow up office visit: no    Additional needs identified to be addressed with provider  No needs identified             Scheduled appointment with PCP within 7-14 days    Follow Up  Future Appointments   Date Time Provider Department Center   2024  3:15 PM [facility]   2/10/2025  8:15 AM Alexander Ang DPM [id number]       YUMIKO KENNEDY MA

## 2024-12-12 LAB
CAMPYLOBACTER DNA SPEC NAA+PROBE: NORMAL
ETEC ELTA+ESTB GENES STL QL NAA+PROBE: NORMAL
P SHIGELLOIDES DNA STL QL NAA+PROBE: NORMAL
SALMONELLA DNA SPEC QL NAA+PROBE: NORMAL
SHIGA TOXIN STX GENE SPEC NAA+PROBE: NORMAL
SHIGELLA DNA SPEC QL NAA+PROBE: NORMAL
SPECIMEN DESCRIPTION: NORMAL
V CHOL+PARA RFBL+TRKH+TNAA STL QL NAA+PR: NORMAL
Y ENTERO RECN STL QL NAA+PROBE: NORMAL

## 2024-12-18 ENCOUNTER — OFFICE VISIT (OUTPATIENT)
Dept: PRIMARY CARE CLINIC | Age: 59
End: 2024-12-18

## 2024-12-18 VITALS
OXYGEN SATURATION: 95 % | BODY MASS INDEX: 38.44 KG/M2 | HEART RATE: 78 BPM | SYSTOLIC BLOOD PRESSURE: 134 MMHG | DIASTOLIC BLOOD PRESSURE: 82 MMHG | WEIGHT: 315 LBS

## 2024-12-18 DIAGNOSIS — R11.2 NAUSEA AND VOMITING, UNSPECIFIED VOMITING TYPE: ICD-10-CM

## 2024-12-18 DIAGNOSIS — Z09 HOSPITAL DISCHARGE FOLLOW-UP: ICD-10-CM

## 2024-12-18 DIAGNOSIS — I10 ESSENTIAL HYPERTENSION: Primary | ICD-10-CM

## 2024-12-18 PROBLEM — F43.23 ADJUSTMENT REACTION WITH ANXIETY AND DEPRESSION: Status: RESOLVED | Noted: 2018-08-15 | Resolved: 2024-12-18

## 2024-12-18 NOTE — PROGRESS NOTES
Post-Discharge Transitional Care  Follow Up      Kain Sanches   YOB: 1965    Date of Office Visit:  12/18/2024  Date of Hospital Admission: 12/8/24  Date of Hospital Discharge: 12/10/24  Risk of hospital readmission (high >=14%. Medium >=10%) :Readmission Risk Score: 5.2      Care management risk score Rising risk (score 2-5) and Complex Care (Scores >=6): No Risk Score On File     Non face to face  following discharge, date last encounter closed (first attempt may have been earlier): 12/11/2024    Call initiated 2 business days of discharge: Yes    ASSESSMENT/PLAN:   Essential hypertension  Nausea and vomiting, unspecified vomiting type  -     Basic Metabolic Panel; Future  Hospital discharge follow-up  -     CA DISCHARGE MEDS RECONCILED W/ CURRENT OUTPATIENT MED LIST      Medical Decision Making: moderate complexity  Return in about 3 months (around 3/18/2025).           Subjective:   HPI:  Follow up of Hospital problems/diagnosis(es):   Nausea vomiting  Diarrhea  Hypokalemia    Inpatient course: Discharge summary reviewed- see chart.    Interval history/Current status: Patient was admitted to the hospital with nausea vomiting and diarrhea.  Patient's gastrointestinal panel was negative.  Patient was treated with IV fluids.  Potassium did drop to 3.4.  Patient is on spironolactone secondary to having low potassium in the past.  However patient now developing breast tenderness more so on the left.  Having minimal swelling.  Has been taking his Maxide.      Patient Active Problem List   Diagnosis    Essential hypertension    Hyperlipidemia    Nephrolithiasis    Obesity    Primary snoring    Radial tunnel syndrome    Spontaneous rupture of tendon of right upper arm    Thrombophlebitis    Varicose veins    Obstructive sleep apnea syndrome    S/P hernia repair    Paroxysmal atrial fibrillation (HCC)    Neuropathy, idiopathic    Angina at rest (HCC)       Medications listed as ordered at the time

## 2025-01-06 ENCOUNTER — HOSPITAL ENCOUNTER (OUTPATIENT)
Age: 60
Discharge: HOME | End: 2025-01-06
Payer: COMMERCIAL

## 2025-01-06 DIAGNOSIS — R11.2 NAUSEA AND VOMITING, UNSPECIFIED VOMITING TYPE: ICD-10-CM

## 2025-01-06 LAB
ANION GAP SERPL CALCULATED.3IONS-SCNC: 12 MMOL/L (ref 9–16)
BUN SERPL-MCNC: 24 MG/DL (ref 6–20)
CALCIUM SERPL-MCNC: 10 MG/DL (ref 8.6–10.4)
CHLORIDE SERPL-SCNC: 104 MMOL/L (ref 98–107)
CO2 SERPL-SCNC: 29 MMOL/L (ref 20–31)
CREAT SERPL-MCNC: 1.5 MG/DL (ref 0.7–1.2)
GFR, ESTIMATED: 53 ML/MIN/1.73M2
GLUCOSE SERPL-MCNC: 118 MG/DL (ref 74–99)
POTASSIUM SERPL-SCNC: 4 MMOL/L (ref 3.7–5.3)
SODIUM SERPL-SCNC: 145 MMOL/L (ref 136–145)

## 2025-01-06 PROCEDURE — 36415 COLL VENOUS BLD VENIPUNCTURE: CPT

## 2025-01-06 PROCEDURE — 80048 BASIC METABOLIC PNL TOTAL CA: CPT

## 2025-01-07 ASSESSMENT — PATIENT HEALTH QUESTIONNAIRE - PHQ9
SUM OF ALL RESPONSES TO PHQ QUESTIONS 1-9: 0
1. LITTLE INTEREST OR PLEASURE IN DOING THINGS: NOT AT ALL
SUM OF ALL RESPONSES TO PHQ9 QUESTIONS 1 & 2: 0
2. FEELING DOWN, DEPRESSED OR HOPELESS: NOT AT ALL
2. FEELING DOWN, DEPRESSED OR HOPELESS: NOT AT ALL
SUM OF ALL RESPONSES TO PHQ QUESTIONS 1-9: 0
SUM OF ALL RESPONSES TO PHQ9 QUESTIONS 1 & 2: 0
1. LITTLE INTEREST OR PLEASURE IN DOING THINGS: NOT AT ALL
SUM OF ALL RESPONSES TO PHQ QUESTIONS 1-9: 0
SUM OF ALL RESPONSES TO PHQ QUESTIONS 1-9: 0

## 2025-01-08 ENCOUNTER — OFFICE VISIT (OUTPATIENT)
Dept: PRIMARY CARE CLINIC | Age: 60
End: 2025-01-08
Payer: COMMERCIAL

## 2025-01-08 VITALS
OXYGEN SATURATION: 98 % | BODY MASS INDEX: 36.45 KG/M2 | DIASTOLIC BLOOD PRESSURE: 92 MMHG | HEART RATE: 90 BPM | WEIGHT: 315 LBS | SYSTOLIC BLOOD PRESSURE: 136 MMHG | HEIGHT: 78 IN

## 2025-01-08 DIAGNOSIS — G60.9 NEUROPATHY, IDIOPATHIC: ICD-10-CM

## 2025-01-08 DIAGNOSIS — R73.9 HYPERGLYCEMIA: Primary | ICD-10-CM

## 2025-01-08 DIAGNOSIS — I48.0 PAROXYSMAL ATRIAL FIBRILLATION (HCC): ICD-10-CM

## 2025-01-08 DIAGNOSIS — I10 ESSENTIAL HYPERTENSION: ICD-10-CM

## 2025-01-08 LAB — HBA1C MFR BLD: 5.6 %

## 2025-01-08 PROCEDURE — 3080F DIAST BP >= 90 MM HG: CPT | Performed by: FAMILY MEDICINE

## 2025-01-08 PROCEDURE — 3075F SYST BP GE 130 - 139MM HG: CPT | Performed by: FAMILY MEDICINE

## 2025-01-08 PROCEDURE — 83036 HEMOGLOBIN GLYCOSYLATED A1C: CPT | Performed by: FAMILY MEDICINE

## 2025-01-08 PROCEDURE — 99214 OFFICE O/P EST MOD 30 MIN: CPT | Performed by: FAMILY MEDICINE

## 2025-01-08 PROCEDURE — 93000 ELECTROCARDIOGRAM COMPLETE: CPT | Performed by: FAMILY MEDICINE

## 2025-01-08 RX ORDER — DILTIAZEM HYDROCHLORIDE 120 MG/1
120 CAPSULE, EXTENDED RELEASE ORAL DAILY
COMMUNITY
Start: 2025-01-07

## 2025-01-08 RX ORDER — VITAMIN B COMPLEX
1 CAPSULE ORAL
COMMUNITY

## 2025-01-08 RX ORDER — APIXABAN 5 MG/1
TABLET, FILM COATED ORAL
COMMUNITY
Start: 2025-01-07 | End: 2025-01-08 | Stop reason: ALTCHOICE

## 2025-01-08 RX ORDER — SPIRONOLACTONE 25 MG/1
TABLET ORAL
COMMUNITY
Start: 2025-01-05 | End: 2025-01-08 | Stop reason: ALTCHOICE

## 2025-01-08 ASSESSMENT — ENCOUNTER SYMPTOMS
VOMITING: 0
SORE THROAT: 0
CHEST TIGHTNESS: 0
EYE REDNESS: 0
WHEEZING: 0
SHORTNESS OF BREATH: 0
ABDOMINAL PAIN: 0
EYE DISCHARGE: 0
NAUSEA: 0
DIARRHEA: 0
RHINORRHEA: 0
COUGH: 0

## 2025-01-08 NOTE — PROGRESS NOTES
[facility]  [facility]ARE  [address]  [place] [address]  Dept: [phone]    Kain Sanches is a 59 y.o. male Established patient, who presents today for his medical conditions/complaints as noted below.      Chief Complaint   Patient presents with    Discuss Labs     Patient would like to discuss labs      Atrial Fibrillation     Patient scheduled with Cardiology due to Afib but would like to discuss medications        HPI:     History of Present Illness  The patient is a 59-year-old male who presents for atrial fibrillation, hypertension, and elevated BUN and creatinine.    He reports a recurrence of atrial fibrillation, which was detected during a stress test on Friday. His pulse oximeter failed to register his pulse due to its fluttering nature. He has an upcoming appointment with a cardiologist on 02/01/2025. He has been prescribed Cardizem, which he plans to collect today. He has declined the use of Eliquis due to concerns about bleeding risk. He has not yet resumed his Cardizem regimen. He has not undergone an EKG since the onset of his atrial fibrillation. He has never been diagnosed with congestive heart failure. He underwent an ablation procedure in 07/2023.    He has a history of hypertension, attributed to his unique condition of having three kidneys. He monitors his blood pressure at home, which he reports as being slightly elevated but within his normal range.    He had blood work done at [facility] on Thursday, 01/06/2024. He is currently on Maxzide, a diuretic medication. He has been advised to take spironolactone for his atrial fibrillation, but he has experienced adverse reactions to this medication. He has a scheduled appointment with his urologist for a routine check-up.    Supplemental Information  He has no history of diabetes, stroke, myocardial infarction, or peripheral vascular disease. He is planning to retire in approximately four

## 2025-01-25 DIAGNOSIS — M25.561 PAIN IN BOTH KNEES, UNSPECIFIED CHRONICITY: Primary | ICD-10-CM

## 2025-01-25 DIAGNOSIS — M25.562 PAIN IN BOTH KNEES, UNSPECIFIED CHRONICITY: Primary | ICD-10-CM

## 2025-01-27 RX ORDER — ALLOPURINOL 100 MG/1
TABLET ORAL
Qty: 180 TABLET | Refills: 3 | Status: SHIPPED | OUTPATIENT
Start: 2025-01-27

## 2025-01-27 RX ORDER — MELOXICAM 15 MG/1
15 TABLET ORAL DAILY
Qty: 30 TABLET | Refills: 0 | Status: SHIPPED | OUTPATIENT
Start: 2025-01-27 | End: 2025-02-27

## 2025-01-27 NOTE — TELEPHONE ENCOUNTER
A 30-day refill will be prescribed for the patient.  Please recommend they obtain further refills from their primary care physician.     Long term NSAID medication requires bloodwork to monitor kidney function, this is not something our office will be monitoring.

## 2025-02-03 ENCOUNTER — HOSPITAL ENCOUNTER (OUTPATIENT)
Age: 60
Discharge: HOME | End: 2025-02-03
Payer: COMMERCIAL

## 2025-02-03 DIAGNOSIS — I10 ESSENTIAL HYPERTENSION: ICD-10-CM

## 2025-02-03 LAB
ANION GAP SERPL CALCULATED.3IONS-SCNC: 10 MMOL/L (ref 9–16)
BUN SERPL-MCNC: 17 MG/DL (ref 6–20)
CALCIUM SERPL-MCNC: 9.2 MG/DL (ref 8.6–10.4)
CHLORIDE SERPL-SCNC: 103 MMOL/L (ref 98–107)
CO2 SERPL-SCNC: 30 MMOL/L (ref 20–31)
CREAT SERPL-MCNC: 1.3 MG/DL (ref 0.7–1.2)
GFR, ESTIMATED: 63 ML/MIN/1.73M2
GLUCOSE SERPL-MCNC: 103 MG/DL (ref 74–99)
POTASSIUM SERPL-SCNC: 3.7 MMOL/L (ref 3.7–5.3)
SODIUM SERPL-SCNC: 143 MMOL/L (ref 136–145)

## 2025-02-03 PROCEDURE — 80048 BASIC METABOLIC PNL TOTAL CA: CPT

## 2025-02-03 PROCEDURE — 36415 COLL VENOUS BLD VENIPUNCTURE: CPT

## 2025-02-04 ENCOUNTER — OFFICE VISIT (OUTPATIENT)
Dept: UROLOGY | Age: 60
End: 2025-02-04
Payer: COMMERCIAL

## 2025-02-04 VITALS
SYSTOLIC BLOOD PRESSURE: 130 MMHG | TEMPERATURE: 97.8 F | DIASTOLIC BLOOD PRESSURE: 84 MMHG | HEART RATE: 103 BPM | BODY MASS INDEX: 36.45 KG/M2 | WEIGHT: 315 LBS | OXYGEN SATURATION: 97 % | HEIGHT: 78 IN

## 2025-02-04 DIAGNOSIS — Z87.442 HISTORY OF KIDNEY STONES: Primary | ICD-10-CM

## 2025-02-04 DIAGNOSIS — Z87.448 HISTORY OF HEMATURIA: ICD-10-CM

## 2025-02-04 DIAGNOSIS — Z12.5 PROSTATE CANCER SCREENING: ICD-10-CM

## 2025-02-04 PROCEDURE — 3075F SYST BP GE 130 - 139MM HG: CPT | Performed by: UROLOGY

## 2025-02-04 PROCEDURE — 99214 OFFICE O/P EST MOD 30 MIN: CPT | Performed by: UROLOGY

## 2025-02-04 PROCEDURE — 3079F DIAST BP 80-89 MM HG: CPT | Performed by: UROLOGY

## 2025-02-04 ASSESSMENT — ENCOUNTER SYMPTOMS
VOMITING: 0
ABDOMINAL PAIN: 0
EYES NEGATIVE: 1
WHEEZING: 0
EYE REDNESS: 0
BACK PAIN: 0
COUGH: 0
ALLERGIC/IMMUNOLOGIC NEGATIVE: 1
GASTROINTESTINAL NEGATIVE: 1
NAUSEA: 0
COLOR CHANGE: 0
EYE PAIN: 0
SHORTNESS OF BREATH: 1

## 2025-02-04 NOTE — PROGRESS NOTES
Review of Systems   Constitutional: Negative.  Negative for appetite change, chills and fever.   HENT: Negative.     Eyes: Negative.  Negative for pain, redness and visual disturbance.   Respiratory:  Positive for shortness of breath. Negative for cough and wheezing.    Cardiovascular: Negative.  Negative for chest pain and leg swelling.   Gastrointestinal: Negative.  Negative for abdominal pain, nausea and vomiting.   Endocrine: Negative.    Genitourinary: Negative.  Negative for difficulty urinating, dysuria, flank pain, frequency, hematuria, testicular pain and urgency.   Musculoskeletal: Negative.  Negative for back pain, joint swelling and myalgias.   Skin: Negative.  Negative for color change, rash and wound.   Allergic/Immunologic: Negative.    Neurological: Negative.  Negative for dizziness, tremors, weakness, numbness and headaches.   Hematological: Negative.  Negative for adenopathy. Does not bruise/bleed easily.   Psychiatric/Behavioral: Negative.       
1 tablet by mouth daily 90 tablet 3    amLODIPine (NORVASC) 5 MG tablet Take 1 tablet by mouth daily         Patient has no known allergies.  Social History     Tobacco Use   Smoking Status Some Days    Types: Cigars    Passive exposure: Never   Smokeless Tobacco Never   Tobacco Comments    I have a few cigars per week     (Ifpatient a smoker, smoking cessation counseling offered)    Social History     Substance and Sexual Activity   Alcohol Use Yes    Alcohol/week: 4.0 standard drinks of alcohol    Types: 2 Cans of beer, 2 Drinks containing 0.5 oz of alcohol per week    Comment: occ       REVIEW OF SYSTEMS:  Review of Systems    Physical Exam:      Vitals:    02/04/25 0938   BP: 130/84   Pulse: (!) 103   Temp: 97.8 °F (36.6 °C)   SpO2: 97%     Body mass index is 39.54 kg/m².  Patient is a 59 y.o. male in no acute distress and alert and oriented to person, place and time.  Physical Exam  Constitutional: Patient in no acute distress.  Neuro: Alert and oriented to person, place and time.  Psych: Mood normal, affect normal  Skin: No rash noted  HEENT: Head: Normocephalic andatraumatic  Conjunctivae and EOM are normal. Pupils are equal, round    Assessment and Plan      1. History of kidney stones    2. Prostate cancer screening    3. History of hematuria           Plan:    Assessment & Plan     Doing well.   Continue Viagra.   Discussed how to take it.   Last imaging was negative for stones 2 years ago.   F/U in 1 year.       Return in about 1 year (around 2/4/2026) for Labs.    Prescriptions Ordered:  No orders of the defined types were placed in this encounter.    Orders Placed:  Orders Placed This Encounter   Procedures    PSA Screening     Standing Status:   Future     Standing Expiration Date:   2/4/2026           Parmjit Hart MD    Agree with the ROS entered by the MA.

## 2025-02-17 NOTE — PROGRESS NOTES
[facility], [address], [address] [place] [address]  Ph: [phone] or [phone]  FAX: [phone]    Chief Complaint: Neuropathy     Dear Elo Tellez MD     I had the pleasure of seeing your patient today in neurology consultation for his symptoms. As you would recall Sol Schneider is a 64 y.o. male. The patient presents to the office for continued care regarding numbness in his lower extremities. The paresthesia was first observed approximately 3 years ago following his second Shingles vaccination. The patient reports that this numbness is not associated with any pain and extends from the midline of his calf to his feet bilaterally. Since the numbness was observed, it has steadily worsened overtime. He has tried multiple pain medications such as Gabapentin, Cymbalta and Topamax with no relief. Patient reports difficulty walking down steps. Denies falls and weakness. He reports difficulty with balance especially when his eyes are closed. The patient denies any family history of numbness. Patient reports that he has not started Lyrica 25 mg TID. He has a history of kidney stones. Patient reports that he was taking allopurinol for an extended period of time prior to onset of paresthesia. He is not diabetic. Today, the patient reports that his symptoms are relatively unchanged since the last visit, and he denies any worsening of his symptoms. Patient continues to have ongoing numbness in his lower extremities from the knee down along with associated unsteady gait which can make ambulation difficult, especially at night. He has undergone physical therapy to improve balance. Denies any weakness. Overall, patient reports that his symptoms are stable. He has a history of varicose veins. Neurological Workup:  EMG on 12/16/2020: This is an abnormal study.  There is electrophysiologic evidence for a sensorimotor neuropathy involving the extremities which is SICU Consultation Note  =====================================================  HPI: 61F PMH HTN, ESRD, on MWF HD, s/p renal transplant 2016, HFpEF, thrombocytopenia, ascites presents to ED for nausea, bilious vomiting and abd pain x 1 day. Patient also endorses SOB, States she only had 1L of fluid out on Friday HD - typically has 2 to 3 L taken out. Denies chest pain and diaphoresis. Pt does not make any urine at baseline. Denies fever, chills diarrhea. Last bowel movement was yesterday. In ED, pt was found to be hypertensive to 238/109 with increased work of breathing and crackles at the bilateral bases. Also had bilious vomiting. Bedside POCUS by ED showing B-lines at the bases. Labs significant for BUN/cr 64/8.69, K 6.4, pH of 7.5, pCO2 41, pO2 21 and HCO3 32. CXR: Bibasilar atelectasis and increased interstitial markings. Nephro consulted for hyperkalemia and hypervolemia on bipap and recommended urgent HD. Accepted to MICU for urgent HD. CT scan demonstrated incarcerated ventral hernia. Patient now s/p OR for ex lap, small bowel resection with primary anastomosis, ventral hernia primary repair with mesh. Patient remains intubated post op.      (17 Feb 2025 01:42)    Allergies: No Known Allergies    PAST MEDICAL & SURGICAL HISTORY:  ESRD (end stage renal disease)      Renal transplant recipient      HTN (hypertension)      HLD (hyperlipidemia)      Heart failure with preserved ejection fraction      Renal transplant, status post        FAMILY HISTORY:      ADVANCE DIRECTIVES: Full Code    REVIEW OF SYSTEMS: Unable to attain secondary to sedation    HOME MEDICATIONS:  --------------------------------------------------------------------------------------  Home Medications:  labetalol 200 mg oral tablet: 1 tab(s) orally 2 times a day (17 Feb 2025 08:37)  NIFEdipine 90 mg oral tablet, extended release: 1 tab(s) orally once a day (17 Feb 2025 08:38)  predniSONE 5 mg oral tablet: 1 tab(s) orally once a day (17 Feb 2025 08:38)  Renagel 800 mg oral tablet: 1 tab(s) orally 3 times a day (with meals) (17 Feb 2025 08:37)  Sensipar 30 mg oral tablet: 1 tab(s) orally once a day (17 Feb 2025 08:36)  tacrolimus 1 mg oral capsule: 1 cap(s) orally 2 times a day (17 Feb 2025 08:38)    --------------------------------------------------------------------------------------    CURRENT MEDICATIONS:   --------------------------------------------------------------------------------------  Neurologic Medications  dexMEDEtomidine Infusion 0.2 MICROgram(s)/kG/Hr IV Continuous <Continuous>  fentaNYL   Infusion 0.5 MICROgram(s)/kG/Hr IV Continuous <Continuous>    Respiratory Medications    Cardiovascular Medications  niCARdipine Infusion 5 mG/Hr IV Continuous <Continuous>    Gastrointestinal Medications    Genitourinary Medications    Hematologic/Oncologic Medications  heparin   Injectable 5000 Unit(s) SubCutaneous every 8 hours    Antimicrobial/Immunologic Medications    Endocrine/Metabolic Medications    Topical/Other Medications  chlorhexidine 0.12% Liquid 15 milliLiter(s) Oral Mucosa every 12 hours  chlorhexidine 2% Cloths 1 Application(s) Topical <User Schedule>    --------------------------------------------------------------------------------------    VITAL SIGNS, INS/OUTS (last 24 hours):  --------------------------------------------------------------------------------------  I&O's Summary    16 Feb 2025 07:01  -  17 Feb 2025 07:00  --------------------------------------------------------  IN: 400 mL / OUT: 1600 mL / NET: -1200 mL    17 Feb 2025 07:01  -  17 Feb 2025 17:21  --------------------------------------------------------  IN: 269.9 mL / OUT: 0 mL / NET: 269.9 mL    --------------------------------------------------------------------------------------    EXAM  NEUROLOGY  Exam: Intubated, sedated    HEENT  Exam: Normocephalic, atraumatic EOMI    RESPIRATORY  Exam: Lungs clear to auscultation, Normal expansion/effort.  ***  Mechanical Ventilation: Mode: AC/ CMV (Assist Control/ Continuous Mandatory Ventilation), RR (machine): 14, TV (machine): 470, FiO2: 45, PEEP: 5, MAP: 9, PIP: 23    CARDIOVASCULAR  Exam: S1, S2.  Regular rate and rhythm, normotensive, not on pressors    GI/NUTRITION  Exam: Abdomen soft, mildly distended, midline incision c/d/i    VASCULAR  Exam: Extremities warm, well-perfused    SKIN:  Exam: Good skin turgor, no skin breakdown    Tubes/Lines/Drains   [x] Peripheral IV  [] Central Venous Line     	[] R	[] L	[] IJ	[] Fem	[] SC	Date Placed:   [x] Arterial Line		[] R	[x] L	[] Fem	[x] Rad	[] Ax	Date Placed: 2/17/2025  [] PICC:         	[] Midline		[] Mediport  [] Urinary Catheter		Date Placed:     LABS  --------------------------------------------------------------------------------------                        11.0   5.32  )-----------( 99       ( 17 Feb 2025 16:48 )             33.6     ABG - ( 17 Feb 2025 16:48 )  pH, Arterial: 7.48  pH, Blood: x     /  pCO2: 43    /  pO2: 132   / HCO3: 32    / Base Excess: 7.7   /  SaO2: 98.5      --------------------------------------------------------------------------------------   affecting the legs more than the arms. Electrophysiologic findings in the lower extremities indicate that the neuropathy is axonal in type. Past Medical History:   Diagnosis Date    Atrial fibrillation [facility])     [address] [facility]    Cancer [facility])     right arm, at site of right arm surgical scar    H/O upper respiratory infection     Hyperlipidemia     Kidney stones     ALLEY on CPAP     Umbilical hernia     Varicose vein of leg     Wellness examination     Dr. Carolin Wharton.  [id number]     Past Surgical History:   Procedure Laterality Date    ARM SURGERY Right     x5, tendon injury    ARM SURGERY Left 01/26/2018    extensor tendon repair    CAPSULOTOMY, HAND      x2, says likely to have another in february 2020    COLONOSCOPY      EYE MUSCLE SURGERY Left as a child    HERNIA REPAIR N/A 12/30/2019    XI LAPAROSCOPIC ROBOTIC [facility] performed by Sherrill Savage MD at [address] Right     ureter, born with 2 right kidneys    TONSILLECTOMY      UMBILICAL HERNIA REPAIR  12/30/2019    LAPAROSCOPIC ROBOTIC UMBILICAL HERNIA REPAIR WITH MESH     VASECTOMY       No Known Allergies  Family History   Problem Relation Age of Onset    Coronary Art Dis Mother     Stroke Mother     Cancer Brother         malignant melanoma of skin    Other Brother         seizures, bipolar, memory loss    Diabetes Maternal Uncle     Lung Cancer Maternal Grandfather     Coronary Art Dis Paternal Grandmother     Cancer Other         breast    Other Father         dementia    High Blood Pressure Sister     Asthma Sister     Arthritis Sister     High Blood Pressure Sister     Other Sister     Arthritis Sister       Social History     Socioeconomic History    Marital status:      Spouse name: Not on file    Number of children: Not on file    Years of education: Not on file    Highest education level: Not on file   Occupational History    Not on file   Tobacco Use    Smoking status: Current Some Day Smoker     Packs/day: 0.00     Years: 10.00     Pack years: 0.00     Types: Cigars    Smokeless tobacco: Never Used    Tobacco comment: I have a few cigars per week   Vaping Use    Vaping Use: Never used   Substance and Sexual Activity    Alcohol use: Yes     Alcohol/week: 4.0 standard drinks     Types: 2 Cans of beer, 2 Standard drinks or equivalent per week     Comment: occ    Drug use: No    Sexual activity: Yes     Partners: Female   Other Topics Concern    Not on file   Social History Narrative    Not on file     Social Determinants of Health     Financial Resource Strain: Low Risk     Difficulty of Paying Living Expenses: Not hard at all   Food Insecurity: No Food Insecurity    Worried About Running Out of Food in the Last Year: Never true    [address] in the Last Year: Never true   Transportation Needs:     Lack of Transportation (Medical): Not on file    Lack of Transportation (Non-Medical): Not on file   Physical Activity:     Days of Exercise per Week: Not on file    Minutes of Exercise per Session: Not on file   Stress:     Feeling of Stress : Not on file   Social Connections:     Frequency of Communication with Friends and Family: Not on file    Frequency of Social Gatherings with Friends and Family: Not on file    Attends Tenriism Services: Not on file    Active Member of [address] [place] [address] or Organizations: Not on file    Attends Club or Organization Meetings: Not on file    Marital Status: Not on file   Intimate Partner Violence:     Fear of Current or Ex-Partner: Not on file    Emotionally Abused: Not on file    Physically Abused: Not on file    Sexually Abused: Not on file   Housing Stability:     Unable to Pay for Housing in the Last Year: Not on file    Number of [place] in the Last Year: Not on file    Unstable Housing in the Last Year: Not on file      There were no vitals taken for this visit. HEENT [] Hearing Loss  [] Visual Disturbance  [] Tinnitus  [] Eye pain   Respiratory [] Shortness of Breath  [] Cough  [] Snoring   Cardiovascular [] Chest Pain  [] Palpitations  [] Lightheaded   GI [] Constipation  [] Diarrhea  [] Swallowing change  [] Nausea/vomiting    [] Urinary Frequency  [] Urinary Urgency   Musculoskeletal [] Neck pain  [] Back pain  [] Muscle pain  [] Restless legs   Dermatologic [] Skin changes   Neurologic [] Memory loss/confusion  [] Seizures  [x] Trouble walking or imbalance  [] Dizziness  [] Sleep disturbance  [] Weakness  [x] Numbness  [] Tremors  [] Speech Difficulty  [] Headaches  [] Light Sensitivity  [] Sound Sensitivity   Endocrinology []Excessive thirst  []Excessive hunger   Psychiatric [] Anxiety/Depression  [] Hallucination   Allergy/immunology []Hives/environmental allergies   Hematologic/lymph [] Abnormal bleeding  [] Abnormal bruising       General appearence: Normal. Well groomed.  In no acute distress    Head: Normocephalic, atraumatic  Eyes: Extraocular movements intact, eye lids normal  Lungs: Respirations unlabored, chest wall no deformity  ENT: Normal external ear canals, no sinus tenderness  Heart: Regular rate rhythm  Abdomen: No masses, tenderness  Extremities: No cyanosis or edema, 2+ pulses  Musculoskeletal: Normal range of motion in all joints  Skin: Intact, normal skin color    Neurological examination:    Mental status   Alert and oriented; intact memory with no confusion, speech or language problems; no hallucinations or delusions     Cranial nerves   II - visual fields intact to confrontation                                                III, IV, VI  extra-ocular muscles full: no pupillary defect; no ALBERTA, no nystagmus, no ptosis   V - normal facial sensation                                                               VII - normal facial symmetry                                                             VIII - intact hearing HPI:  61F PMH HTN, ESRD on HD s/p renal transplant at [facility] ([facility], [facility]) 2016, HFpEF presenting with nausea, vomiting abd pain x2 days with SOB and concern for fluid overload with hypertensive urgency who was found to have a ventral hernia containing small bowel with concern for closed loop configuration of SBO. Pt in MICU, with NGT recently placed with 650 dark green contents in cannister. Pt denies other abd surgeries other than the renal transplant. Last BM 2/16, last flatus 2/16.  Colonoscopy prev with polyps, unsure what year, also at [facility]. Pt denies abdominal pain currently, denies N/V since NGT placed. Pt denies fevers/chills.     PAST MEDICAL & SURGICAL HISTORY:  ESRD (end stage renal disease)    Renal transplant recipient    HTN (hypertension)    HLD (hyperlipidemia)    Heart failure with preserved ejection fraction    Renal transplant, status post    MEDICATIONS  (STANDING):  chlorhexidine 2% Cloths 1 Application(s) Topical <User Schedule>  niCARdipine Infusion 5 mG/Hr (25 mL/Hr) IV Continuous <Continuous>    Allergies    No Known Allergies    Intolerances    Physical Exam:  General: NAD, resting comfortably, NGT in place  HEENT: NC/AT  Pulmonary: normal resp effort, CTA-B  Cardiovascular: NSR, no murmurs  Abdominal: soft, mild tenderness in LUQ, ventral hernia noted immediately superior to umbilicus. No evidence of skin changes. Non tender over hernia. R sided incision c/d/i well healed. No rebound, no guarding.   Extremities: WWP, normal strength, no clubbing/cyanosis/edema  Neuro: A/O x 3  Pulses: palpable distal pulses    Vital Signs Last 24 Hrs  T(C): 35.2 (17 Feb 2025 06:45), Max: 36.7 (17 Feb 2025 01:20)  T(F): 95.3 (17 Feb 2025 06:45), Max: 98 (17 Feb 2025 01:20)  HR: 74 (17 Feb 2025 06:45) (67 - 85)  BP: 204/143 (17 Feb 2025 06:45) (151/103 - 238/109)  BP(mean): 100 (17 Feb 2025 06:00) (100 - 145)  RR: 13 (17 Feb 2025 06:45) (13 - 21)  SpO2: 97% (17 Feb 2025 06:45) (97% - 100%)    Parameters below as of 17 Feb 2025 06:45  Patient On (Oxygen Delivery Method): nasal cannula  O2 Flow (L/min): 2    I&O's Summary    16 Feb 2025 07:01  -  17 Feb 2025 07:00  --------------------------------------------------------  IN: 400 mL / OUT: 1600 mL / NET: -1200 mL    LABS:                        11.4   9.40  )-----------( 94       ( 17 Feb 2025 04:30 )             37.1     02-17    137  |  91[L]  |  69[H]  ----------------------------<  148[H]  4.9   |  24  |  9.00[H]    Ca    10.3      17 Feb 2025 04:30  Phos  4.7     02-17  Mg     3.20     02-17    TPro  7.1  /  Alb  3.7  /  TBili  0.6  /  DBili  x   /  AST  20  /  ALT  9   /  AlkPhos  285[H]  02-17    PT/INR - ( 17 Feb 2025 04:30 )   PT: 11.9 sec;   INR: 1.00 ratio         PTT - ( 17 Feb 2025 04:30 )  PTT:36.9 sec  Urinalysis Basic - ( 17 Feb 2025 04:30 )    Color: x / Appearance: x / SG: x / pH: x  Gluc: 148 mg/dL / Ketone: x  / Bili: x / Urobili: x   Blood: x / Protein: x / Nitrite: x   Leuk Esterase: x / RBC: x / WBC x   Sq Epi: x / Non Sq Epi: x / Bacteria: x    CAPILLARY BLOOD GLUCOSE    POCT Blood Glucose.: 145 mg/dL (17 Feb 2025 06:53)  POCT Blood Glucose.: 96 mg/dL (17 Feb 2025 06:30)  POCT Blood Glucose.: 132 mg/dL (17 Feb 2025 04:29)  POCT Blood Glucose.: 99 mg/dL (17 Feb 2025 03:51)  POCT Blood Glucose.: 77 mg/dL (16 Feb 2025 23:56)    LIVER FUNCTIONS - ( 17 Feb 2025 04:30 )  Alb: 3.7 g/dL / Pro: 7.1 g/dL / ALK PHOS: 285 U/L / ALT: 9 U/L / AST: 20 U/L / GGT: 65 U/L       RADIOLOGY & ADDITIONAL STUDIES:  ACC: [id number] EXAM:  CT ABDOMEN AND PELVIS IC   ORDERED BY: PARKER NEWMAN     PROCEDURE DATE:  02/17/2025    ******PRELIMINARY REPORT******      ******PRELIMINARY REPORT******     INTERPRETATION:  CLINICAL INFORMATION: Abdominal pain. Nausea and   vomiting.    COMPARISON: None.    CONTRAST/COMPLICATIONS:  IV Contrast: Omnipaque 350  80 cc administered   20 cc discarded  Oral Contrast: NONE    PRELIMINARY  IMPRESSION:    Small bowel obstruction with transition point at the level of the small   bowel loop entering the ventral hernia (Series 301, Images 59). A second   transition point is questioned at the level where the small bowel exits   the ventralhernia (301-62) which raises concern for closed loop SBO.   Ventral hernia contains fluid and loops of bowel which appear inseparable   from each other. The small bowel distal to the hernia is collapsed    Moderate perihepatic, perisplenic and pelvicascites.    Follow-up final report.    Findings were discussed with surgery resident Dr. Whittaker  2/17/2025 7:25   AM by Dr. Bermudez with read back confirmation.    ******PRELIMINARY REPORT******      ******PRELIMINARY REPORT******     STEVAN ASTUDILLO; Resident Radiologist  This document is a PRELIMINARY interpretation and is pending final   attending approval. Feb 17 2025  7:29AM     IX, X - symmetrical palate                                                                  XI - symmetrical shoulder shrug                                                       XII - midline tongue without atrophy or fasciculation     Motor function  Normal muscle bulk and tone; normal power 5/5, including fine motor movements     Sensory function Decreased sensation in lower extremities below the knee. Cerebellar Intact fine motor movement. No involuntary movements or tremors     Reflex function Absent reflexes in the ankles bilaterally    Gait                  Unsteady gait       Lab Results   Component Value Date    LDLCHOLESTEROL 150 (H) 09/22/2021     No components found for: CHLPL  Lab Results   Component Value Date    TRIG 58 09/22/2021    TRIG 109 05/16/2018    TRIG 95 02/03/2018     Lab Results   Component Value Date    HDL 58 09/22/2021    HDL 58 07/10/2020    HDL 53 06/25/2019     No results found for: LDLCALC  No results found for: LABVLDL  Lab Results   Component Value Date    LABA1C 5.2 02/08/2021     Lab Results   Component Value Date     02/08/2021     Lab Results   Component Value Date    [id number] 748 02/03/2021      Neurological work up:  EMG on 12/16/2020: This is an abnormal study. There is electrophysiologic evidence for a sensorimotor neuropathy involving the extremities which is affecting the legs more than the arms. Electrophysiologic findings in the lower extremities indicate that the neuropathy is axonal in type. All of patient's labs were personally reviewed. All the imaging studies were personally reviewed and discussed with the patient.      Assessment Recommendations:  Sensorimotor neuropathy, idiopathic; Symptoms began following Shingles vaccination and were present prior to initiation of flecainide and allopurinol    The patient reports that his paresthesia in both lower extremities below knee without associated pain. His symptoms are relatively unchanged since the last visit and the patient remains stable. At this time, I do not believe the patient would benefit from medication intervention at this time. His recent glucose tolerance 2hr test and other labs have been normal. I discussed the possibility of repeat lab testing every 6 months. Fall precautions and safety protocols were discussed with the patient. Patient to follow up as needed. Davis Gibson MD I would like to thank you for the consult. Please do not hesitate if you have any questions about the patient care. This note is created with the assistance of a speech-recognition program. While intending to generate a document that actually reflects the content of the visit, the document can still have some errors including those of syntax and sound a- like substitutions which may escape proofreading. In such instances, actual meaning can be extrapolated by contextual derivation. Scribe Attestation:   By signing my name below, Krissy Mabrys, attest that this documentation has been prepared under the direction and in the presence of Roseann Quinones MD.    Electronically Signed: Ashlie Lu. 06/13/22. 9:12 AM    I, Devika Jeter MD, personally performed the services described in this documentation. All medical record entries made by the scribe were at my direction and in my presence. I have reviewed the chart and discharge instructions (if applicable) and agree that the record reflects my personal performance and is accurate and complete.     Electronically Signed: Devika Jeter 6/19/2022 11:29 PM    Diplomate, American Board of Psychiatry and Neurology  Diplomate, American Board of Clinical Neurophysiology  Diplomate, American Board of Epilepsy [facility]   MD ERMELINDA LEON MD ANGELA WONG, PA        TEL:  OFFICE: [phone]  From 5pm-7am answering service [phone]    --- INITIAL RENAL CONSULT NOTE ---date of service 02-17-25 @ 12:15    HPI:  61-year-old female with past medical history of HTN, ESRD, on MWF HD, s/p renal transplant 2016, HFpEF, thrombocytopenia, ascites presents to ED for nausea, bilious vomiting and abd pain x 1 day. Patient also endorses SOB, States she only had 1L of fluid out on Friday HD - typically has 2 to 3 L taken out. Denies chest pain and diaphoresis. Pt does not make any urine at baseline. Denies fever, chills diarrhea. Last bowel movement was yesterday.   In ED, pt was found to be hypertensive to 238/109 with increased work of breathing and crackles at the bilateral bases. Also had bilious vomiting. Bedside POCUS by ED showing B-lines at the bases.  Nephro consulted for hyperkalemia and hypervolemia on bipap and recommended urgent HD. Accepted to MICU for urgent HD.  pt had hd today treatment terminated after 2hrs due to bradycardia UF 1.2L  found to have ventral hernia containing small bowel with obstruction, and likely closed loop SBO. NGT placed for decompression. surgery consulted for urgent OR planning     Allergies:  No Known Allergies      PAST MEDICAL & SURGICAL HISTORY:  ESRD (end stage renal disease)      Renal transplant recipient      HTN (hypertension)      HLD (hyperlipidemia)      Heart failure with preserved ejection fraction      Renal transplant, status post          Home Medications Reviewed    Hospital Medications:   MEDICATIONS  (STANDING):  chlorhexidine 2% Cloths 1 Application(s) Topical <User Schedule>  heparin   Injectable 5000 Unit(s) SubCutaneous every 8 hours  niCARdipine Infusion 5 mG/Hr (25 mL/Hr) IV Continuous <Continuous>  piperacillin/tazobactam IVPB.- 3.375 Gram(s) IV Intermittent once      SOCIAL HISTORY:  Denies ETOh, Smoking,     FAMILY HISTORY:      REVIEW OF SYSTEMS:  CONSTITUTIONAL: No weakness, fevers or chills  EYES/ENT: No visual changes;  No vertigo or throat pain   NECK: No pain or stiffness  RESPIRATORY: + shortness of breath  CARDIOVASCULAR: No chest pain or palpitations.  GASTROINTESTINAL: see hpi  GENITOURINARY: No dysuria, frequency, foamy urine, urinary urgency, incontinence or hematuria  NEUROLOGICAL: No numbness or weakness  SKIN: No itching, burning, rashes, or lesions   VASCULAR: No bilateral lower extremity edema.   All other review of systems is negative unless indicated above.    VITALS:  T(F): 97.7 (02-17-25 @ 10:49), Max: 98 (02-17-25 @ 01:20)  HR: 92 (02-17-25 @ 11:24)  BP: 152/86 (02-17-25 @ 11:24)  RR: 13 (02-17-25 @ 11:24)  SpO2: 97% (02-17-25 @ 11:24)  Wt(kg): --    02-16 @ 07:01  -  02-17 @ 07:00  --------------------------------------------------------  IN: 400 mL / OUT: 1600 mL / NET: -1200 mL    02-17 @ 07:01  -  02-17 @ 12:15  --------------------------------------------------------  IN: 157.5 mL / OUT: 0 mL / NET: 157.5 mL      Height (cm): 167.6 (02-17 @ 10:49)  Weight (kg): 71.3 (02-17 @ 10:49)  BMI (kg/m2): 25.4 (02-17 @ 10:49)  BSA (m2): 1.81 (02-17 @ 10:49)    PHYSICAL EXAM:  General: NAD  HEENT: anicteric sclera, oropharynx clear, MMM  Neck: No JVD  Respiratory: CTAB, no wheezes, rales or rhonchi  Cardiovascular: S1, S2, RRR  Gastrointestinal: +NGT, + BS, right LQ pain  Extremities: No cyanosis or clubbing. No peripheral edema  Neurological: A/O x 3, no focal deficits  Psychiatric: Normal mood, normal affect  : No CVA tenderness. No zarate.   Skin: No rashes  Vascular Access: avf    LABS:  02-17    140  |  96[L]  |  42[H]  ----------------------------<  119[H]  5.0   |  26  |  6.44[H]    Ca    10.0      17 Feb 2025 08:00  Phos  4.2     02-17  Mg     2.70     02-17    TPro  7.7  /  Alb  3.8  /  TBili  0.9  /  DBili      /  AST  23  /  ALT  11  /  AlkPhos  309[H]  02-17    Creatinine Trend: 6.44 <--, 9.00 <--, 8.69 <--                        12.2   7.34  )-----------( 92       ( 17 Feb 2025 08:00 )             39.0     Urine Studies:  Urinalysis Basic - ( 17 Feb 2025 08:00 )    Color:  / Appearance:  / SG:  / pH:   Gluc: 119 mg/dL / Ketone:   / Bili:  / Urobili:    Blood:  / Protein:  / Nitrite:    Leuk Esterase:  / RBC:  / WBC    Sq Epi:  / Non Sq Epi:  / Bacteria:           RADIOLOGY & ADDITIONAL STUDIES:

## 2025-02-26 ENCOUNTER — TELEPHONE (OUTPATIENT)
Dept: ORTHOPEDIC SURGERY | Age: 60
End: 2025-02-26

## 2025-02-26 ENCOUNTER — OFFICE VISIT (OUTPATIENT)
Dept: PODIATRY | Age: 60
End: 2025-02-26

## 2025-02-26 VITALS — WEIGHT: 315 LBS | BODY MASS INDEX: 36.45 KG/M2 | HEIGHT: 78 IN

## 2025-02-26 DIAGNOSIS — G60.9 IDIOPATHIC NEUROPATHY: ICD-10-CM

## 2025-02-26 DIAGNOSIS — B35.1 ONYCHOMYCOSIS OF TOENAIL: Primary | ICD-10-CM

## 2025-02-26 DIAGNOSIS — M79.674 PAIN OF TOES OF BOTH FEET: ICD-10-CM

## 2025-02-26 DIAGNOSIS — M79.675 PAIN OF TOES OF BOTH FEET: ICD-10-CM

## 2025-02-26 RX ORDER — APIXABAN 5 MG/1
TABLET, FILM COATED ORAL
COMMUNITY
Start: 2025-02-14

## 2025-02-26 ASSESSMENT — ENCOUNTER SYMPTOMS
NAUSEA: 0
BACK PAIN: 0
COLOR CHANGE: 0
SHORTNESS OF BREATH: 0
DIARRHEA: 0

## 2025-02-26 NOTE — PROGRESS NOTES
SUBJECTIVE: Kain Sanches is a 59 y.o. male who returns to the office with chief complaint of painful fungal toenails. Patient relates toe nails are thickened/difficult to trim as well as painful with ambulation and with shoe gear.   Chief Complaint   Patient presents with    Nail Problem     B/l nail trim, last seen Abhi Roahc MD 1/8/25     Review of Systems   Constitutional:  Negative for activity change, appetite change, chills, diaphoresis, fatigue and fever.   Respiratory:  Negative for shortness of breath.    Cardiovascular:  Negative for leg swelling.   Gastrointestinal:  Negative for diarrhea and nausea.   Endocrine: Negative for cold intolerance, heat intolerance and polyuria.   Musculoskeletal:  Positive for arthralgias. Negative for back pain, gait problem, joint swelling and myalgias.   Skin:  Negative for color change, pallor, rash and wound.   Allergic/Immunologic: Negative for environmental allergies and food allergies.   Neurological:  Negative for dizziness, weakness, light-headedness and numbness.   Hematological:  Does not bruise/bleed easily.   Psychiatric/Behavioral:  Negative for behavioral problems, confusion and self-injury. The patient is not nervous/anxious.      OBJECTIVE: Clinical evaluation of patient reveals nails 1,2,3,4,5 of the right foot and nails 1,2,3,4,5 of the left foot to present with thickness, elongation, discoloration, brittleness, and subungual debris. There was pain with palpation and debridement of the toenails of the bilateral feet. No open lesions noted to either foot today.   Protective sensation is absent to the right plantar foot as noted with a 5.07 [place] monofilament.   Protective sensation is absent to the left plantar foot as noted with a 5.07 [place] monofilament.     Class A Findings (1 needed)   [] Non-traumatic amputation of foot or integral skeleton portion thereof.   [] Q7.      Class B Findings (2 needed)   1. [] Absent posterior

## 2025-02-26 NOTE — TELEPHONE ENCOUNTER
[facility]. Last time Shahrzad filled it she requested he follow up with PCP for more refills. Phone call to pt to let him know, at which time he asked that I let you know it is working very well and he will contact his PCP for refills.

## 2025-05-13 ENCOUNTER — OFFICE VISIT (OUTPATIENT)
Dept: PODIATRY | Age: 60
End: 2025-05-13
Payer: COMMERCIAL

## 2025-05-13 VITALS — BODY MASS INDEX: 36.45 KG/M2 | WEIGHT: 315 LBS | HEIGHT: 78 IN

## 2025-05-13 DIAGNOSIS — B35.1 ONYCHOMYCOSIS OF TOENAIL: Primary | ICD-10-CM

## 2025-05-13 DIAGNOSIS — G60.9 IDIOPATHIC NEUROPATHY: ICD-10-CM

## 2025-05-13 DIAGNOSIS — M79.675 PAIN OF TOES OF BOTH FEET: ICD-10-CM

## 2025-05-13 DIAGNOSIS — M79.674 PAIN OF TOES OF BOTH FEET: ICD-10-CM

## 2025-05-13 PROCEDURE — 11721 DEBRIDE NAIL 6 OR MORE: CPT | Performed by: PODIATRIST

## 2025-05-13 PROCEDURE — 99999 PR OFFICE/OUTPT VISIT,PROCEDURE ONLY: CPT | Performed by: PODIATRIST

## 2025-05-13 ASSESSMENT — ENCOUNTER SYMPTOMS
COLOR CHANGE: 0
NAUSEA: 0
BACK PAIN: 0
DIARRHEA: 0
SHORTNESS OF BREATH: 0

## 2025-05-13 NOTE — PROGRESS NOTES
SUBJECTIVE: Kain Sanches is a 59 y.o. male who returns to the office with chief complaint of painful fungal toenails. Patient relates toe nails are thickened/difficult to trim as well as painful with ambulation and with shoe gear.   Chief Complaint   Patient presents with    Nail Problem     B/l nail trim     Review of Systems   Constitutional:  Negative for activity change, appetite change, chills, diaphoresis, fatigue and fever.   Respiratory:  Negative for shortness of breath.    Cardiovascular:  Negative for leg swelling.   Gastrointestinal:  Negative for diarrhea and nausea.   Endocrine: Negative for cold intolerance, heat intolerance and polyuria.   Musculoskeletal:  Positive for arthralgias. Negative for back pain, gait problem, joint swelling and myalgias.   Skin:  Negative for color change, pallor, rash and wound.   Allergic/Immunologic: Negative for environmental allergies and food allergies.   Neurological:  Negative for dizziness, weakness, light-headedness and numbness.   Hematological:  Does not bruise/bleed easily.   Psychiatric/Behavioral:  Negative for behavioral problems, confusion and self-injury. The patient is not nervous/anxious.      OBJECTIVE: Clinical evaluation of patient reveals nails 1,2,3,4,5 of the right foot and nails 1,2,3,4,5 of the left foot to present with thickness, elongation, discoloration, brittleness, and subungual debris. There was pain with palpation and debridement of the toenails of the bilateral feet. No open lesions noted to either foot today.   Protective sensation is absent to the right plantar foot as noted with a 5.07 [place] monofilament.   Protective sensation is absent to the left plantar foot as noted with a 5.07 [place] monofilament.     Class A Findings (1 needed)   [] Non-traumatic amputation of foot or integral skeleton portion thereof.   [] Q7.      Class B Findings (2 needed)   1. [] Absent posterior tibial pulse   2. [] Absent

## 2025-05-24 DIAGNOSIS — M25.561 PAIN IN BOTH KNEES, UNSPECIFIED CHRONICITY: ICD-10-CM

## 2025-05-24 DIAGNOSIS — M25.562 PAIN IN BOTH KNEES, UNSPECIFIED CHRONICITY: ICD-10-CM

## 2025-05-27 RX ORDER — MELOXICAM 15 MG/1
15 TABLET ORAL DAILY
Qty: 90 TABLET | Refills: 0 | Status: SHIPPED | OUTPATIENT
Start: 2025-05-27

## 2025-08-12 ENCOUNTER — OFFICE VISIT (OUTPATIENT)
Dept: PODIATRY | Age: 60
End: 2025-08-12
Payer: COMMERCIAL

## 2025-08-12 VITALS — HEIGHT: 78 IN | BODY MASS INDEX: 36.45 KG/M2 | WEIGHT: 315 LBS

## 2025-08-12 DIAGNOSIS — B35.1 ONYCHOMYCOSIS OF TOENAIL: Primary | ICD-10-CM

## 2025-08-12 DIAGNOSIS — M79.674 PAIN OF TOES OF BOTH FEET: ICD-10-CM

## 2025-08-12 DIAGNOSIS — M79.675 PAIN OF TOES OF BOTH FEET: ICD-10-CM

## 2025-08-12 DIAGNOSIS — G60.9 IDIOPATHIC NEUROPATHY: ICD-10-CM

## 2025-08-12 PROCEDURE — 99999 PR OFFICE/OUTPT VISIT,PROCEDURE ONLY: CPT | Performed by: PODIATRIST

## 2025-08-12 PROCEDURE — 11721 DEBRIDE NAIL 6 OR MORE: CPT | Performed by: PODIATRIST

## 2025-08-12 RX ORDER — APIXABAN 5 MG/1
TABLET, FILM COATED ORAL
COMMUNITY
Start: 2025-05-17

## 2025-08-12 ASSESSMENT — ENCOUNTER SYMPTOMS
COLOR CHANGE: 0
NAUSEA: 0
DIARRHEA: 0
SHORTNESS OF BREATH: 0
BACK PAIN: 0

## 2025-08-22 DIAGNOSIS — M25.561 PAIN IN BOTH KNEES, UNSPECIFIED CHRONICITY: ICD-10-CM

## 2025-08-22 DIAGNOSIS — M25.562 PAIN IN BOTH KNEES, UNSPECIFIED CHRONICITY: ICD-10-CM

## 2025-08-22 RX ORDER — MELOXICAM 15 MG/1
15 TABLET ORAL DAILY
Qty: 90 TABLET | Refills: 0 | Status: SHIPPED | OUTPATIENT
Start: 2025-08-22

## 2025-08-24 DIAGNOSIS — M25.561 PAIN IN BOTH KNEES, UNSPECIFIED CHRONICITY: ICD-10-CM

## 2025-08-24 DIAGNOSIS — M25.562 PAIN IN BOTH KNEES, UNSPECIFIED CHRONICITY: ICD-10-CM

## 2025-08-25 RX ORDER — MELOXICAM 15 MG/1
15 TABLET ORAL DAILY
Qty: 90 TABLET | Refills: 0 | Status: SHIPPED | OUTPATIENT
Start: 2025-08-25

## (undated) DEVICE — ARM DRAPE

## (undated) DEVICE — DEVICE TRCR 12X9X3IN WHT CLSR DISP OMNICLOSE

## (undated) DEVICE — ADHESIVE SKIN CLSR 0.7ML TOP DERMBND ADV

## (undated) DEVICE — CANNULA SEAL

## (undated) DEVICE — SUTURE MCRYL + SZ 4-0 L18IN ABSRB UD L19MM PS-2 3/8 CIR MCP496G

## (undated) DEVICE — SUTURE DEV SZ 2-0 WND CLSR ABSRB GS-22 VLOC COVIDIEN VLOCM2145

## (undated) DEVICE — INSUFFLATION TUBING SET WITH FILTER, FUNNEL CONNECTOR AND LUER LOCK: Brand: JOSNOE MEDICAL INC

## (undated) DEVICE — BAG SPEC REM 224ML W4XL6IN DIA10MM 1 HND GYN DISP ENDOPCH

## (undated) DEVICE — BLADELESS OBTURATOR: Brand: WECK VISTA

## (undated) DEVICE — Device

## (undated) DEVICE — TIP COVER ACCESSORY

## (undated) DEVICE — TROCAR: Brand: KII FIOS FIRST ENTRY

## (undated) DEVICE — CHLORAPREP 26ML ORANGE

## (undated) DEVICE — HYPODERMIC SAFETY NEEDLE: Brand: MAGELLAN

## (undated) DEVICE — SOLUTION ANTIFOG VIS SYS CLEARIFY LAPSCP

## (undated) DEVICE — SUTURE MCRYL SZ 4-0 L18IN ABSRB UD L16MM PC-3 3/8 CIR PRIM Y845G

## (undated) DEVICE — SUTURE V-LOC 180 SZ 0 L9IN ABSRB GRN GS-21 L37MM 1/2 CIR VLOCL0346